# Patient Record
Sex: FEMALE | Race: WHITE | Employment: OTHER | URBAN - NONMETROPOLITAN AREA
[De-identification: names, ages, dates, MRNs, and addresses within clinical notes are randomized per-mention and may not be internally consistent; named-entity substitution may affect disease eponyms.]

---

## 2017-03-29 ENCOUNTER — TELEPHONE (OUTPATIENT)
Dept: PRIMARY CARE CLINIC | Age: 72
End: 2017-03-29

## 2017-03-31 DIAGNOSIS — I10 ESSENTIAL HYPERTENSION: Primary | ICD-10-CM

## 2017-04-10 DIAGNOSIS — I10 ESSENTIAL HYPERTENSION: ICD-10-CM

## 2017-04-10 LAB
ALBUMIN SERPL-MCNC: 4.4 G/DL (ref 3.5–5.2)
ALP BLD-CCNC: 75 U/L (ref 35–104)
ALT SERPL-CCNC: 18 U/L (ref 5–33)
ANION GAP SERPL CALCULATED.3IONS-SCNC: 14 MMOL/L (ref 7–19)
AST SERPL-CCNC: 19 U/L (ref 5–32)
BILIRUB SERPL-MCNC: 0.7 MG/DL (ref 0.2–1.2)
BUN BLDV-MCNC: 23 MG/DL (ref 8–23)
CALCIUM SERPL-MCNC: 9.7 MG/DL (ref 8.8–10.2)
CHLORIDE BLD-SCNC: 100 MMOL/L (ref 98–111)
CHOLESTEROL, TOTAL: 210 MG/DL (ref 160–199)
CO2: 27 MMOL/L (ref 22–29)
CREAT SERPL-MCNC: 0.7 MG/DL (ref 0.5–0.9)
GFR NON-AFRICAN AMERICAN: >60
GLOBULIN: 2.8 G/DL
GLUCOSE BLD-MCNC: 94 MG/DL (ref 74–109)
HDLC SERPL-MCNC: 65 MG/DL (ref 65–121)
LDL CHOLESTEROL CALCULATED: 119 MG/DL
POTASSIUM SERPL-SCNC: 4.1 MMOL/L (ref 3.5–5)
SODIUM BLD-SCNC: 141 MMOL/L (ref 136–145)
TOTAL PROTEIN: 7.2 G/DL (ref 6.6–8.7)
TRIGL SERPL-MCNC: 130 MG/DL (ref 150–199)
TSH SERPL DL<=0.05 MIU/L-ACNC: 1.22 UIU/ML (ref 0.27–4.2)

## 2017-04-17 ENCOUNTER — OFFICE VISIT (OUTPATIENT)
Dept: PRIMARY CARE CLINIC | Age: 72
End: 2017-04-17
Payer: MEDICARE

## 2017-04-17 VITALS
BODY MASS INDEX: 34.36 KG/M2 | SYSTOLIC BLOOD PRESSURE: 120 MMHG | RESPIRATION RATE: 20 BRPM | WEIGHT: 182 LBS | DIASTOLIC BLOOD PRESSURE: 82 MMHG | OXYGEN SATURATION: 98 % | HEIGHT: 61 IN | TEMPERATURE: 97 F | HEART RATE: 88 BPM

## 2017-04-17 DIAGNOSIS — Z23 NEED FOR PROPHYLACTIC VACCINATION AGAINST STREPTOCOCCUS PNEUMONIAE (PNEUMOCOCCUS): ICD-10-CM

## 2017-04-17 DIAGNOSIS — I10 ESSENTIAL HYPERTENSION: ICD-10-CM

## 2017-04-17 DIAGNOSIS — E78.2 MIXED HYPERLIPIDEMIA: ICD-10-CM

## 2017-04-17 DIAGNOSIS — Z12.31 ENCOUNTER FOR SCREENING MAMMOGRAM FOR MALIGNANT NEOPLASM OF BREAST: ICD-10-CM

## 2017-04-17 DIAGNOSIS — Z00.00 ROUTINE GENERAL MEDICAL EXAMINATION AT A HEALTH CARE FACILITY: Primary | ICD-10-CM

## 2017-04-17 DIAGNOSIS — Z12.39 BREAST CANCER SCREENING, HIGH RISK PATIENT: ICD-10-CM

## 2017-04-17 DIAGNOSIS — Z13.820 OSTEOPOROSIS SCREENING: ICD-10-CM

## 2017-04-17 DIAGNOSIS — Z90.710 HISTORY OF HYSTERECTOMY FOR CANCER: ICD-10-CM

## 2017-04-17 PROCEDURE — 3017F COLORECTAL CA SCREEN DOC REV: CPT | Performed by: FAMILY MEDICINE

## 2017-04-17 PROCEDURE — 99213 OFFICE O/P EST LOW 20 MIN: CPT | Performed by: FAMILY MEDICINE

## 2017-04-17 PROCEDURE — 4040F PNEUMOC VAC/ADMIN/RCVD: CPT | Performed by: FAMILY MEDICINE

## 2017-04-17 PROCEDURE — 1036F TOBACCO NON-USER: CPT | Performed by: FAMILY MEDICINE

## 2017-04-17 PROCEDURE — 1090F PRES/ABSN URINE INCON ASSESS: CPT | Performed by: FAMILY MEDICINE

## 2017-04-17 PROCEDURE — G8400 PT W/DXA NO RESULTS DOC: HCPCS | Performed by: FAMILY MEDICINE

## 2017-04-17 PROCEDURE — G8427 DOCREV CUR MEDS BY ELIG CLIN: HCPCS | Performed by: FAMILY MEDICINE

## 2017-04-17 PROCEDURE — 1123F ACP DISCUSS/DSCN MKR DOCD: CPT | Performed by: FAMILY MEDICINE

## 2017-04-17 PROCEDURE — G0439 PPPS, SUBSEQ VISIT: HCPCS | Performed by: FAMILY MEDICINE

## 2017-04-17 PROCEDURE — 90732 PPSV23 VACC 2 YRS+ SUBQ/IM: CPT | Performed by: FAMILY MEDICINE

## 2017-04-17 PROCEDURE — 3014F SCREEN MAMMO DOC REV: CPT | Performed by: FAMILY MEDICINE

## 2017-04-17 PROCEDURE — G0009 ADMIN PNEUMOCOCCAL VACCINE: HCPCS | Performed by: FAMILY MEDICINE

## 2017-04-17 PROCEDURE — G8417 CALC BMI ABV UP PARAM F/U: HCPCS | Performed by: FAMILY MEDICINE

## 2017-04-17 RX ORDER — LISINOPRIL 30 MG/1
30 TABLET ORAL DAILY
Qty: 90 TABLET | Refills: 3 | Status: SHIPPED | OUTPATIENT
Start: 2017-04-17 | End: 2018-04-23 | Stop reason: SDUPTHER

## 2017-04-17 RX ORDER — HYDROCHLOROTHIAZIDE 25 MG/1
25 TABLET ORAL DAILY
Qty: 90 TABLET | Refills: 3 | Status: SHIPPED | OUTPATIENT
Start: 2017-04-17 | End: 2017-04-17 | Stop reason: SDUPTHER

## 2017-04-17 RX ORDER — HYDROCHLOROTHIAZIDE 25 MG/1
25 TABLET ORAL DAILY
Qty: 90 TABLET | Refills: 3 | Status: SHIPPED | OUTPATIENT
Start: 2017-04-17 | End: 2018-04-23

## 2017-04-17 RX ORDER — LOVASTATIN 40 MG/1
40 TABLET ORAL NIGHTLY
Qty: 90 TABLET | Refills: 3 | Status: SHIPPED | OUTPATIENT
Start: 2017-04-17 | End: 2018-04-23 | Stop reason: SDUPTHER

## 2017-04-17 RX ORDER — LISINOPRIL 30 MG/1
30 TABLET ORAL DAILY
Qty: 90 TABLET | Refills: 3 | Status: SHIPPED | OUTPATIENT
Start: 2017-04-17 | End: 2017-04-17 | Stop reason: SDUPTHER

## 2017-04-17 RX ORDER — LOVASTATIN 40 MG/1
40 TABLET ORAL NIGHTLY
Qty: 90 TABLET | Refills: 3 | Status: SHIPPED | OUTPATIENT
Start: 2017-04-17 | End: 2017-04-17 | Stop reason: SDUPTHER

## 2017-04-17 ASSESSMENT — ENCOUNTER SYMPTOMS
EYE PAIN: 0
PHOTOPHOBIA: 0
VOMITING: 0
COUGH: 0
NAUSEA: 0
RHINORRHEA: 0
TROUBLE SWALLOWING: 0
DIARRHEA: 0
WHEEZING: 0
SHORTNESS OF BREATH: 0
ABDOMINAL PAIN: 0
CHEST TIGHTNESS: 0
COLOR CHANGE: 0
CONSTIPATION: 0
SORE THROAT: 0

## 2017-07-19 ENCOUNTER — OFFICE VISIT (OUTPATIENT)
Dept: GASTROENTEROLOGY | Facility: CLINIC | Age: 72
End: 2017-07-19

## 2017-07-19 VITALS
BODY MASS INDEX: 33.61 KG/M2 | OXYGEN SATURATION: 98 % | WEIGHT: 178 LBS | HEIGHT: 61 IN | HEART RATE: 69 BPM | TEMPERATURE: 98.5 F | DIASTOLIC BLOOD PRESSURE: 70 MMHG | SYSTOLIC BLOOD PRESSURE: 124 MMHG

## 2017-07-19 DIAGNOSIS — K63.5 COLON POLYPS: Primary | ICD-10-CM

## 2017-07-19 DIAGNOSIS — Z83.71 FAMILY HISTORY OF POLYPS IN THE COLON: ICD-10-CM

## 2017-07-19 PROCEDURE — S0260 H&P FOR SURGERY: HCPCS | Performed by: NURSE PRACTITIONER

## 2017-07-19 RX ORDER — HYDROCHLOROTHIAZIDE 25 MG/1
25 TABLET ORAL DAILY
COMMUNITY
Start: 2017-06-26 | End: 2022-09-22 | Stop reason: ALTCHOICE

## 2017-07-19 RX ORDER — LOVASTATIN 40 MG/1
40 TABLET ORAL NIGHTLY
COMMUNITY
Start: 2017-06-05

## 2017-07-19 RX ORDER — LISINOPRIL 40 MG/1
40 TABLET ORAL DAILY
COMMUNITY
Start: 2017-06-26

## 2017-07-19 RX ORDER — CHLORAL HYDRATE 500 MG
1000 CAPSULE ORAL
COMMUNITY

## 2017-07-19 RX ORDER — SODIUM, POTASSIUM,MAG SULFATES 17.5-3.13G
2 SOLUTION, RECONSTITUTED, ORAL ORAL ONCE
Qty: 2 BOTTLE | Refills: 0 | Status: SHIPPED | OUTPATIENT
Start: 2017-07-19 | End: 2017-07-19

## 2017-07-19 RX ORDER — FAMOTIDINE 20 MG
1000 TABLET ORAL DAILY
COMMUNITY

## 2017-07-19 NOTE — PROGRESS NOTES
Chief Complaint   Patient presents with   • Colon Cancer Screening     Patient is here today for colon screening.     Subjective   HPI  Charity Meek is a 71 y.o. female who presents as a referral for preventative maintenance. She has no complaints of nausea or vomiting. No change in bowels. No wt loss. No BRBPR. No melena. There is no family hx for colon cancer. No abdominal pain. Last colonoscopy was 6/18/12 polypoid lesion at the dentate line, anterior wall at the anus. She was sent to  for removal and had it removed on 2 separate occasions.  Past Medical History:   Diagnosis Date   • DJD (degenerative joint disease)    • HTN (hypertension)    • Hyperlipemia      Past Surgical History:   Procedure Laterality Date   • BREAST BIOPSY     • COLONOSCOPY  06/18/2012   • HYSTERECTOMY     • REPLACEMENT TOTAL KNEE         Current Outpatient Prescriptions:   •  hydrochlorothiazide (HYDRODIURIL) 25 MG tablet, , Disp: , Rfl:   •  lisinopril (PRINIVIL,ZESTRIL) 30 MG tablet, , Disp: , Rfl:   •  lovastatin (MEVACOR) 40 MG tablet, , Disp: , Rfl:   •  Omega-3 Fatty Acids (FISH OIL) 1000 MG capsule capsule, Take  by mouth., Disp: , Rfl:   •  Vitamin D, Cholecalciferol, (CHOLECALCIFEROL) 400 UNITS tablet, Take 400 Units by mouth Daily., Disp: , Rfl:   •  sodium-potassium-magnesium sulfates (SUPREP BOWEL PREP KIT) 17.5-3.13-1.6 GM/180ML solution oral solution, Take 2 bottles by mouth 1 (One) Time for 1 dose. Split dose prep as directed by office instructions provided.  2 bottles = one kit., Disp: 2 bottle, Rfl: 0  No Known Allergies  Social History     Social History   • Marital status:      Spouse name: N/A   • Number of children: N/A   • Years of education: N/A     Occupational History   • Not on file.     Social History Main Topics   • Smoking status: Never Smoker   • Smokeless tobacco: Never Used   • Alcohol use No   • Drug use: Not on file   • Sexual activity: Not on file     Other Topics Concern   • Not on  "file     Social History Narrative     Family History   Problem Relation Age of Onset   • Colon polyps Sister        REVIEW OF SYSTEMS  General: well appearing, no fever chills or sweats, no unexplained wt loss  HEENT: no acute visual or hearing disturbances  Cardiovascular: No chest pain or palpitations  Pulmonary: No shortness of breath, coughing, wheezing or hemoptysis  : No burning, urgency, hematuria, or dysuria  Musculoskeletal: No joint pain or stiffness  Peripheral: no edema  Skin: No lesions or rashes  Neuro: No dizziness, headaches, stroke, syncope  Endocrine: No hot or cold intolerances  Hematological: No blood dyscrasias    Objective   Vitals:    07/19/17 0849   BP: 124/70   Pulse: 69   Temp: 98.5 °F (36.9 °C)   SpO2: 98%   Weight: 178 lb (80.7 kg)   Height: 61\" (154.9 cm)     Body mass index is 33.63 kg/(m^2).    PHYSICAL EXAM  General: age appropriate well nourished well appearing, no acute distress  Head: normocephalic and atraumatic  Global assessment-supple  Neck-No JVD noted, no lymphadenopathy  Pulmonary-clear to auscultation bilaterally, normal respiratory effort  Cardiovascular-normal rate and rhythm, normal heart sounds, S1 and S2 noted  Abdomen-soft, non tender, non distended, normal bowel sounds all 4 quadrants, no hepatosplenomegaly noted  Extremities-No clubbing cyanosis or edema  Neuro-Non focal, converses appropriately, awake, alert, oriented    Imaging Results (most recent)     None        Assessment/Plan   Charity was seen today for colon cancer screening.    Diagnoses and all orders for this visit:    Colon polyps  -     Case Request; Standing  -     Case Request    Family history of polyps in the colon  -     Case Request; Standing  -     Case Request    Other orders  -     Implement Anesthesia Orders Day of Procedure; Standing  -     Obtain Informed Consent; Standing  -     Verify bowel prep was successful; Standing  -     sodium-potassium-magnesium sulfates (SUPREP BOWEL PREP KIT) " 17.5-3.13-1.6 GM/180ML solution oral solution; Take 2 bottles by mouth 1 (One) Time for 1 dose. Split dose prep as directed by office instructions provided.  2 bottles = one kit.      COLONOSCOPY WITH ANESTHESIA (N/A)  Return for FU sp procedure as directed.  There are no Patient Instructions on file for this visit.    All risks, benefits, alternatives, and indications of colonoscopy procedure have been discussed with the patient. Risks to include perforation of the colon requiring possible surgery or colostomy, risk of bleeding from biopsies or removal of colon tissue, possibility of missing a colon polyp or cancer, or adverse drug reaction.  Benefits to include the diagnosis and management of disease of the colon and rectum. Alternatives to include barium enema, radiographic evaluation, lab testing or no intervention. Pt verbalizes understanding and agrees.

## 2017-08-30 ENCOUNTER — ANESTHESIA (OUTPATIENT)
Dept: GASTROENTEROLOGY | Facility: HOSPITAL | Age: 72
End: 2017-08-30

## 2017-08-30 ENCOUNTER — ANESTHESIA EVENT (OUTPATIENT)
Dept: GASTROENTEROLOGY | Facility: HOSPITAL | Age: 72
End: 2017-08-30

## 2017-08-30 ENCOUNTER — HOSPITAL ENCOUNTER (OUTPATIENT)
Facility: HOSPITAL | Age: 72
Setting detail: HOSPITAL OUTPATIENT SURGERY
Discharge: HOME | End: 2017-08-30
Attending: INTERNAL MEDICINE | Admitting: INTERNAL MEDICINE

## 2017-08-30 VITALS
BODY MASS INDEX: 33.77 KG/M2 | SYSTOLIC BLOOD PRESSURE: 134 MMHG | DIASTOLIC BLOOD PRESSURE: 70 MMHG | OXYGEN SATURATION: 97 % | WEIGHT: 172 LBS | HEART RATE: 67 BPM | HEIGHT: 60 IN | RESPIRATION RATE: 15 BRPM | TEMPERATURE: 98.3 F

## 2017-08-30 DIAGNOSIS — K63.5 COLON POLYPS: ICD-10-CM

## 2017-08-30 DIAGNOSIS — Z83.71 FAMILY HISTORY OF POLYPS IN THE COLON: ICD-10-CM

## 2017-08-30 PROCEDURE — 45385 COLONOSCOPY W/LESION REMOVAL: CPT | Performed by: INTERNAL MEDICINE

## 2017-08-30 PROCEDURE — 25010000002 PROPOFOL 10 MG/ML EMULSION: Performed by: NURSE ANESTHETIST, CERTIFIED REGISTERED

## 2017-08-30 PROCEDURE — 88305 TISSUE EXAM BY PATHOLOGIST: CPT | Performed by: INTERNAL MEDICINE

## 2017-08-30 RX ORDER — LIDOCAINE HYDROCHLORIDE 20 MG/ML
INJECTION, SOLUTION INFILTRATION; PERINEURAL AS NEEDED
Status: DISCONTINUED | OUTPATIENT
Start: 2017-08-30 | End: 2017-08-30 | Stop reason: SURG

## 2017-08-30 RX ORDER — SODIUM CHLORIDE 9 MG/ML
100 INJECTION, SOLUTION INTRAVENOUS CONTINUOUS
Status: DISCONTINUED | OUTPATIENT
Start: 2017-08-30 | End: 2017-08-30 | Stop reason: HOSPADM

## 2017-08-30 RX ORDER — PROPOFOL 10 MG/ML
VIAL (ML) INTRAVENOUS AS NEEDED
Status: DISCONTINUED | OUTPATIENT
Start: 2017-08-30 | End: 2017-08-30 | Stop reason: SURG

## 2017-08-30 RX ORDER — SODIUM CHLORIDE 0.9 % (FLUSH) 0.9 %
1-10 SYRINGE (ML) INJECTION AS NEEDED
Status: DISCONTINUED | OUTPATIENT
Start: 2017-08-30 | End: 2017-08-30 | Stop reason: HOSPADM

## 2017-08-30 RX ADMIN — PROPOFOL 10 MG: 10 INJECTION, EMULSION INTRAVENOUS at 10:47

## 2017-08-30 RX ADMIN — LIDOCAINE HYDROCHLORIDE 50 MG: 20 INJECTION, SOLUTION INFILTRATION; PERINEURAL at 10:40

## 2017-08-30 RX ADMIN — SODIUM CHLORIDE 100 ML/HR: 9 INJECTION, SOLUTION INTRAVENOUS at 09:15

## 2017-08-30 RX ADMIN — PROPOFOL 50 MG: 10 INJECTION, EMULSION INTRAVENOUS at 10:40

## 2017-08-30 RX ADMIN — PROPOFOL 30 MG: 10 INJECTION, EMULSION INTRAVENOUS at 10:42

## 2017-08-30 RX ADMIN — PROPOFOL 30 MG: 10 INJECTION, EMULSION INTRAVENOUS at 10:45

## 2017-08-30 RX ADMIN — PROPOFOL 10 MG: 10 INJECTION, EMULSION INTRAVENOUS at 10:50

## 2017-08-30 RX ADMIN — PROPOFOL 10 MG: 10 INJECTION, EMULSION INTRAVENOUS at 10:48

## 2017-08-30 RX ADMIN — PROPOFOL 30 MG: 10 INJECTION, EMULSION INTRAVENOUS at 10:41

## 2017-08-30 RX ADMIN — PROPOFOL 10 MG: 10 INJECTION, EMULSION INTRAVENOUS at 10:51

## 2017-08-30 RX ADMIN — PROPOFOL 30 MG: 10 INJECTION, EMULSION INTRAVENOUS at 10:46

## 2017-08-30 NOTE — ANESTHESIA POSTPROCEDURE EVALUATION
"Patient: Charity Meek    Procedure Summary     Date Anesthesia Start Anesthesia Stop Room / Location    08/30/17 1034 1058  [facility] / [facility]       Procedure Diagnosis Surgeon Provider    COLONOSCOPY WITH ANESTHESIA (N/A ) Family history of polyps in the colon; Colon polyps  (Family history of polyps in the colon [Z83.71]; Colon polyps [K63.5]) MD Leslie Stevens, ZACH          Anesthesia Type: general  Last vitals  BP        Temp        Pulse       Resp        SpO2          Post Anesthesia Care and Evaluation    Patient location during evaluation: PHASE II  Patient participation: complete - patient participated  Level of consciousness: awake and alert  Pain score: 0  Pain management: adequate  Airway patency: patent  Anesthetic complications: No anesthetic complications    Cardiovascular status: acceptable and stable  Respiratory status: acceptable and unassisted  Hydration status: stable    Comments: Blood pressure 131/87, pulse 80, temperature 98.3 °F (36.8 °C), temperature source Temporal Artery , resp. rate 20, height 60\" (152.4 cm), weight 172 lb (78 kg), SpO2 96 %.      "

## 2017-08-30 NOTE — ANESTHESIA PREPROCEDURE EVALUATION
Anesthesia Evaluation     Patient summary reviewed and Nursing notes reviewed   no history of anesthetic complications:  NPO Solid Status: > 8 hours  NPO Liquid Status: > 4 hours     Airway   Mallampati: II  TM distance: >3 FB  Neck ROM: full  no difficulty expected  Dental - normal exam     Comment: Multiple permanent crowns and caps    Pulmonary - normal exam   (-) COPD, asthma, recent URI, sleep apnea, not a smoker  Cardiovascular - normal exam  Exercise tolerance: good (4-7 METS) (Mild SOB with climbing steps, no CP)    Rhythm: regular  Rate: normal    (+) hypertension well controlled, hyperlipidemia  (-) past MI, angina, cardiac stents      Neuro/Psych  (-) seizures, TIA, CVA, weakness, numbness  GI/Hepatic/Renal/Endo    (-) GERD, liver disease, no renal disease, diabetes, hypothyroidism, hyperthyroidism    Musculoskeletal     (-) neck pain, neck stiffness  Abdominal    Substance History      OB/GYN          Other   (+) arthritis                                   Anesthesia Plan    ASA 2     general   total IV anesthesia  intravenous induction   Anesthetic plan and risks discussed with patient.

## 2017-09-01 LAB
LAB AP CASE REPORT: NORMAL
LAB AP CLINICAL INFORMATION: NORMAL
Lab: NORMAL
PATH REPORT.FINAL DX SPEC: NORMAL
PATH REPORT.GROSS SPEC: NORMAL

## 2017-09-08 ENCOUNTER — TELEPHONE (OUTPATIENT)
Dept: GASTROENTEROLOGY | Facility: CLINIC | Age: 72
End: 2017-09-08

## 2017-09-08 NOTE — TELEPHONE ENCOUNTER
----- Message from Viky Hu MD sent at 9/3/2017 10:05 PM CDT -----  I am writing to inform you that the polyp removed from the colon did not have any cancer. It no longer poses a threat to you since it was completely removed.  However, in the future, it is possible that additional polyps might grow.  For this reason, we will repeat colonoscopy in 5 years as planned.    If you have any questions, please call our office.    Sincerely,        Viky Hu MD

## 2018-04-23 ENCOUNTER — OFFICE VISIT (OUTPATIENT)
Dept: PRIMARY CARE CLINIC | Age: 73
End: 2018-04-23
Payer: MEDICARE

## 2018-04-23 VITALS
SYSTOLIC BLOOD PRESSURE: 142 MMHG | TEMPERATURE: 98 F | DIASTOLIC BLOOD PRESSURE: 82 MMHG | BODY MASS INDEX: 35.93 KG/M2 | HEART RATE: 72 BPM | RESPIRATION RATE: 20 BRPM | HEIGHT: 60 IN | WEIGHT: 183 LBS

## 2018-04-23 DIAGNOSIS — M10.279 ACUTE DRUG-INDUCED GOUT OF FOOT, UNSPECIFIED LATERALITY: ICD-10-CM

## 2018-04-23 DIAGNOSIS — I10 ESSENTIAL HYPERTENSION: ICD-10-CM

## 2018-04-23 DIAGNOSIS — M85.80 OSTEOPENIA, UNSPECIFIED LOCATION: ICD-10-CM

## 2018-04-23 DIAGNOSIS — E78.2 MIXED HYPERLIPIDEMIA: ICD-10-CM

## 2018-04-23 DIAGNOSIS — Z00.00 ROUTINE GENERAL MEDICAL EXAMINATION AT A HEALTH CARE FACILITY: Primary | ICD-10-CM

## 2018-04-23 LAB
ALBUMIN SERPL-MCNC: 4.6 G/DL (ref 3.5–5.2)
ALP BLD-CCNC: 78 U/L (ref 35–104)
ALT SERPL-CCNC: 26 U/L (ref 5–33)
ANION GAP SERPL CALCULATED.3IONS-SCNC: 18 MMOL/L (ref 7–19)
AST SERPL-CCNC: 25 U/L (ref 5–32)
BASOPHILS ABSOLUTE: 0 K/UL (ref 0–0.2)
BASOPHILS RELATIVE PERCENT: 0.2 % (ref 0–1)
BILIRUB SERPL-MCNC: 0.8 MG/DL (ref 0.2–1.2)
BUN BLDV-MCNC: 16 MG/DL (ref 8–23)
CALCIUM SERPL-MCNC: 10.2 MG/DL (ref 8.8–10.2)
CHLORIDE BLD-SCNC: 98 MMOL/L (ref 98–111)
CHOLESTEROL, TOTAL: 206 MG/DL (ref 160–199)
CO2: 26 MMOL/L (ref 22–29)
CREAT SERPL-MCNC: 0.7 MG/DL (ref 0.5–0.9)
EOSINOPHILS ABSOLUTE: 0 K/UL (ref 0–0.6)
EOSINOPHILS RELATIVE PERCENT: 0.7 % (ref 0–5)
GFR NON-AFRICAN AMERICAN: >60
GLUCOSE BLD-MCNC: 86 MG/DL (ref 74–109)
HCT VFR BLD CALC: 44.4 % (ref 37–47)
HDLC SERPL-MCNC: 70 MG/DL (ref 65–121)
HEMOGLOBIN: 15 G/DL (ref 12–16)
LDL CHOLESTEROL CALCULATED: 116 MG/DL
LYMPHOCYTES ABSOLUTE: 1.6 K/UL (ref 1.1–4.5)
LYMPHOCYTES RELATIVE PERCENT: 26.3 % (ref 20–40)
MCH RBC QN AUTO: 30.2 PG (ref 27–31)
MCHC RBC AUTO-ENTMCNC: 33.8 G/DL (ref 33–37)
MCV RBC AUTO: 89.5 FL (ref 81–99)
MONOCYTES ABSOLUTE: 0.4 K/UL (ref 0–0.9)
MONOCYTES RELATIVE PERCENT: 6.8 % (ref 0–10)
NEUTROPHILS ABSOLUTE: 4 K/UL (ref 1.5–7.5)
NEUTROPHILS RELATIVE PERCENT: 65.8 % (ref 50–65)
PDW BLD-RTO: 12.4 % (ref 11.5–14.5)
PLATELET # BLD: 183 K/UL (ref 130–400)
PMV BLD AUTO: 10.3 FL (ref 9.4–12.3)
POTASSIUM SERPL-SCNC: 3.7 MMOL/L (ref 3.5–5)
RBC # BLD: 4.96 M/UL (ref 4.2–5.4)
SODIUM BLD-SCNC: 142 MMOL/L (ref 136–145)
TOTAL PROTEIN: 7.8 G/DL (ref 6.6–8.7)
TRIGL SERPL-MCNC: 101 MG/DL (ref 0–149)
WBC # BLD: 6 K/UL (ref 4.8–10.8)

## 2018-04-23 PROCEDURE — 1090F PRES/ABSN URINE INCON ASSESS: CPT | Performed by: FAMILY MEDICINE

## 2018-04-23 PROCEDURE — G8427 DOCREV CUR MEDS BY ELIG CLIN: HCPCS | Performed by: FAMILY MEDICINE

## 2018-04-23 PROCEDURE — 1123F ACP DISCUSS/DSCN MKR DOCD: CPT | Performed by: FAMILY MEDICINE

## 2018-04-23 PROCEDURE — G8400 PT W/DXA NO RESULTS DOC: HCPCS | Performed by: FAMILY MEDICINE

## 2018-04-23 PROCEDURE — 1036F TOBACCO NON-USER: CPT | Performed by: FAMILY MEDICINE

## 2018-04-23 PROCEDURE — G0439 PPPS, SUBSEQ VISIT: HCPCS | Performed by: FAMILY MEDICINE

## 2018-04-23 PROCEDURE — G8417 CALC BMI ABV UP PARAM F/U: HCPCS | Performed by: FAMILY MEDICINE

## 2018-04-23 PROCEDURE — 3017F COLORECTAL CA SCREEN DOC REV: CPT | Performed by: FAMILY MEDICINE

## 2018-04-23 PROCEDURE — 4040F PNEUMOC VAC/ADMIN/RCVD: CPT | Performed by: FAMILY MEDICINE

## 2018-04-23 PROCEDURE — 99213 OFFICE O/P EST LOW 20 MIN: CPT | Performed by: FAMILY MEDICINE

## 2018-04-23 RX ORDER — LISINOPRIL 40 MG/1
40 TABLET ORAL DAILY
Qty: 90 TABLET | Refills: 3 | Status: SHIPPED | OUTPATIENT
Start: 2018-04-23 | End: 2019-05-15 | Stop reason: SDUPTHER

## 2018-04-23 RX ORDER — LISINOPRIL 40 MG/1
40 TABLET ORAL DAILY
Qty: 90 TABLET | Refills: 3 | Status: SHIPPED | OUTPATIENT
Start: 2018-04-23 | End: 2018-04-23 | Stop reason: SDUPTHER

## 2018-04-23 RX ORDER — LOVASTATIN 40 MG/1
40 TABLET ORAL NIGHTLY
Qty: 90 TABLET | Refills: 3 | Status: SHIPPED | OUTPATIENT
Start: 2018-04-23 | End: 2018-06-05 | Stop reason: SDUPTHER

## 2018-04-23 ASSESSMENT — ENCOUNTER SYMPTOMS
SHORTNESS OF BREATH: 0
SORE THROAT: 0
EYE ITCHING: 0
RHINORRHEA: 0
COUGH: 0
NAUSEA: 0
ABDOMINAL PAIN: 0
COLOR CHANGE: 0

## 2018-04-23 ASSESSMENT — PATIENT HEALTH QUESTIONNAIRE - PHQ9
SUM OF ALL RESPONSES TO PHQ QUESTIONS 1-9: 0
2. FEELING DOWN, DEPRESSED OR HOPELESS: 0
SUM OF ALL RESPONSES TO PHQ9 QUESTIONS 1 & 2: 0
1. LITTLE INTEREST OR PLEASURE IN DOING THINGS: 0

## 2018-06-05 DIAGNOSIS — E78.2 MIXED HYPERLIPIDEMIA: ICD-10-CM

## 2018-06-05 RX ORDER — LOVASTATIN 40 MG/1
40 TABLET ORAL NIGHTLY
Qty: 90 TABLET | Refills: 3 | Status: SHIPPED | OUTPATIENT
Start: 2018-06-05 | End: 2019-05-15 | Stop reason: SDUPTHER

## 2018-10-29 ENCOUNTER — OFFICE VISIT (OUTPATIENT)
Dept: PRIMARY CARE CLINIC | Age: 73
End: 2018-10-29
Payer: MEDICARE

## 2018-10-29 VITALS
SYSTOLIC BLOOD PRESSURE: 158 MMHG | HEART RATE: 65 BPM | TEMPERATURE: 98 F | WEIGHT: 191 LBS | OXYGEN SATURATION: 98 % | RESPIRATION RATE: 20 BRPM | HEIGHT: 60 IN | BODY MASS INDEX: 37.5 KG/M2 | DIASTOLIC BLOOD PRESSURE: 98 MMHG

## 2018-10-29 DIAGNOSIS — I87.2 VENOUS (PERIPHERAL) INSUFFICIENCY: ICD-10-CM

## 2018-10-29 DIAGNOSIS — M10.241 ACUTE DRUG-INDUCED GOUT OF RIGHT HAND: ICD-10-CM

## 2018-10-29 DIAGNOSIS — I10 UNCONTROLLED HYPERTENSION: Primary | ICD-10-CM

## 2018-10-29 PROCEDURE — 1123F ACP DISCUSS/DSCN MKR DOCD: CPT | Performed by: FAMILY MEDICINE

## 2018-10-29 PROCEDURE — 99214 OFFICE O/P EST MOD 30 MIN: CPT | Performed by: FAMILY MEDICINE

## 2018-10-29 PROCEDURE — G8417 CALC BMI ABV UP PARAM F/U: HCPCS | Performed by: FAMILY MEDICINE

## 2018-10-29 PROCEDURE — 1036F TOBACCO NON-USER: CPT | Performed by: FAMILY MEDICINE

## 2018-10-29 PROCEDURE — 1090F PRES/ABSN URINE INCON ASSESS: CPT | Performed by: FAMILY MEDICINE

## 2018-10-29 PROCEDURE — 4040F PNEUMOC VAC/ADMIN/RCVD: CPT | Performed by: FAMILY MEDICINE

## 2018-10-29 PROCEDURE — G8427 DOCREV CUR MEDS BY ELIG CLIN: HCPCS | Performed by: FAMILY MEDICINE

## 2018-10-29 PROCEDURE — 1101F PT FALLS ASSESS-DOCD LE1/YR: CPT | Performed by: FAMILY MEDICINE

## 2018-10-29 PROCEDURE — G8484 FLU IMMUNIZE NO ADMIN: HCPCS | Performed by: FAMILY MEDICINE

## 2018-10-29 PROCEDURE — 3017F COLORECTAL CA SCREEN DOC REV: CPT | Performed by: FAMILY MEDICINE

## 2018-10-29 PROCEDURE — G8400 PT W/DXA NO RESULTS DOC: HCPCS | Performed by: FAMILY MEDICINE

## 2018-10-29 ASSESSMENT — ENCOUNTER SYMPTOMS
BACK PAIN: 0
COUGH: 0
SHORTNESS OF BREATH: 0
DIARRHEA: 0
WHEEZING: 0
VOMITING: 0
CHEST TIGHTNESS: 0
CONSTIPATION: 0
NAUSEA: 0
ABDOMINAL PAIN: 0

## 2018-10-29 NOTE — PROGRESS NOTES
Surgical History:   Procedure Laterality Date    ANUS SURGERY      polyp removed    BREAST BIOPSY      Right-benign    COLONOSCOPY      KNEE SURGERY      bilateral replacement    RECTAL SURGERY  8/24/2012    Rigid proctoscopy with transanal excision of rectal polyp       Social History   Substance Use Topics    Smoking status: Never Smoker    Smokeless tobacco: Never Used    Alcohol use No       Family History   Problem Relation Age of Onset    Heart Disease Mother     High Blood Pressure Mother     Heart Disease Father     High Blood Pressure Father        BP (!) 158/98   Pulse 65   Temp 98 °F (36.7 °C) (Temporal)   Resp 20   Ht 5' (1.524 m)   Wt 191 lb (86.6 kg)   SpO2 98%   BMI 37.30 kg/m²     Physical Exam   Constitutional: She is oriented to person, place, and time. She appears well-developed and well-nourished. No distress. HENT:   Head: Normocephalic and atraumatic. Cardiovascular: Normal rate, regular rhythm and normal heart sounds. No murmur heard. Pulmonary/Chest: Effort normal and breath sounds normal. No respiratory distress. She has no wheezes. She has no rales. Abdominal: Soft. Bowel sounds are normal. She exhibits no distension. There is no tenderness. Musculoskeletal: She exhibits edema (1+ b/l pitting). No pretibial edema b/l. Neurological: She is alert and oriented to person, place, and time. Skin: Skin is warm and dry. She is not diaphoretic. No cyanosis. Nursing note and vitals reviewed. Assessment:    ICD-10-CM    1. Uncontrolled hypertension I10    2. Acute drug-induced gout of right hand M10.241    3. Venous (peripheral) insufficiency I87.2        Plan:   Looks like venous insufficiency, Rx for comp stocking. HTN uncontrolled, see med changes. If one more gout flare, start naproxen daily w/ allopurinol for 6 weeks then allopurinol daily  No orders of the defined types were placed in this encounter.     Orders Placed This Encounter   Medications

## 2019-05-15 ENCOUNTER — OFFICE VISIT (OUTPATIENT)
Dept: PRIMARY CARE CLINIC | Age: 74
End: 2019-05-15
Payer: MEDICARE

## 2019-05-15 VITALS
HEIGHT: 60 IN | SYSTOLIC BLOOD PRESSURE: 142 MMHG | HEART RATE: 94 BPM | DIASTOLIC BLOOD PRESSURE: 94 MMHG | OXYGEN SATURATION: 96 % | TEMPERATURE: 98.2 F | BODY MASS INDEX: 38.05 KG/M2 | WEIGHT: 193.8 LBS

## 2019-05-15 DIAGNOSIS — E78.2 MIXED HYPERLIPIDEMIA: ICD-10-CM

## 2019-05-15 DIAGNOSIS — M10.372 ACUTE GOUT DUE TO RENAL IMPAIRMENT INVOLVING TOE OF LEFT FOOT: ICD-10-CM

## 2019-05-15 DIAGNOSIS — Z00.00 ROUTINE GENERAL MEDICAL EXAMINATION AT A HEALTH CARE FACILITY: Primary | ICD-10-CM

## 2019-05-15 DIAGNOSIS — I10 ESSENTIAL HYPERTENSION: ICD-10-CM

## 2019-05-15 LAB
ALBUMIN SERPL-MCNC: 4.6 G/DL (ref 3.5–5.2)
ALP BLD-CCNC: 89 U/L (ref 35–104)
ALT SERPL-CCNC: 20 U/L (ref 5–33)
ANION GAP SERPL CALCULATED.3IONS-SCNC: 13 MMOL/L (ref 7–19)
AST SERPL-CCNC: 19 U/L (ref 5–32)
BACTERIA: NEGATIVE /HPF
BILIRUB SERPL-MCNC: 0.9 MG/DL (ref 0.2–1.2)
BILIRUBIN URINE: NEGATIVE
BLOOD, URINE: NEGATIVE
BUN BLDV-MCNC: 15 MG/DL (ref 8–23)
CALCIUM SERPL-MCNC: 9.9 MG/DL (ref 8.8–10.2)
CHLORIDE BLD-SCNC: 100 MMOL/L (ref 98–111)
CLARITY: ABNORMAL
CO2: 30 MMOL/L (ref 22–29)
COLOR: ABNORMAL
CREAT SERPL-MCNC: 0.8 MG/DL (ref 0.5–0.9)
EPITHELIAL CELLS, UA: 6 /HPF (ref 0–5)
GFR NON-AFRICAN AMERICAN: >60
GLUCOSE BLD-MCNC: 103 MG/DL (ref 74–109)
GLUCOSE URINE: NEGATIVE MG/DL
HCT VFR BLD CALC: 46.8 % (ref 37–47)
HEMOGLOBIN: 15.3 G/DL (ref 12–16)
HYALINE CASTS: 5 /HPF (ref 0–8)
KETONES, URINE: NEGATIVE MG/DL
LEUKOCYTE ESTERASE, URINE: ABNORMAL
MCH RBC QN AUTO: 29.5 PG (ref 27–31)
MCHC RBC AUTO-ENTMCNC: 32.7 G/DL (ref 33–37)
MCV RBC AUTO: 90.3 FL (ref 81–99)
NITRITE, URINE: NEGATIVE
PDW BLD-RTO: 12.7 % (ref 11.5–14.5)
PH UA: 6 (ref 5–8)
PLATELET # BLD: 197 K/UL (ref 130–400)
PMV BLD AUTO: 10.5 FL (ref 9.4–12.3)
POTASSIUM SERPL-SCNC: 4.4 MMOL/L (ref 3.5–5)
PROTEIN UA: ABNORMAL MG/DL
RBC # BLD: 5.18 M/UL (ref 4.2–5.4)
RBC UA: 4 /HPF (ref 0–4)
SODIUM BLD-SCNC: 143 MMOL/L (ref 136–145)
SPECIFIC GRAVITY UA: 1.02 (ref 1–1.03)
TOTAL PROTEIN: 7.9 G/DL (ref 6.6–8.7)
UROBILINOGEN, URINE: 0.2 E.U./DL
WBC # BLD: 8.9 K/UL (ref 4.8–10.8)
WBC UA: 42 /HPF (ref 0–5)

## 2019-05-15 PROCEDURE — 99213 OFFICE O/P EST LOW 20 MIN: CPT | Performed by: FAMILY MEDICINE

## 2019-05-15 PROCEDURE — 1123F ACP DISCUSS/DSCN MKR DOCD: CPT | Performed by: FAMILY MEDICINE

## 2019-05-15 PROCEDURE — 96372 THER/PROPH/DIAG INJ SC/IM: CPT | Performed by: FAMILY MEDICINE

## 2019-05-15 PROCEDURE — 4040F PNEUMOC VAC/ADMIN/RCVD: CPT | Performed by: FAMILY MEDICINE

## 2019-05-15 PROCEDURE — 1036F TOBACCO NON-USER: CPT | Performed by: FAMILY MEDICINE

## 2019-05-15 PROCEDURE — 1090F PRES/ABSN URINE INCON ASSESS: CPT | Performed by: FAMILY MEDICINE

## 2019-05-15 PROCEDURE — G8417 CALC BMI ABV UP PARAM F/U: HCPCS | Performed by: FAMILY MEDICINE

## 2019-05-15 PROCEDURE — G0438 PPPS, INITIAL VISIT: HCPCS | Performed by: FAMILY MEDICINE

## 2019-05-15 PROCEDURE — G8400 PT W/DXA NO RESULTS DOC: HCPCS | Performed by: FAMILY MEDICINE

## 2019-05-15 PROCEDURE — G8427 DOCREV CUR MEDS BY ELIG CLIN: HCPCS | Performed by: FAMILY MEDICINE

## 2019-05-15 PROCEDURE — 3017F COLORECTAL CA SCREEN DOC REV: CPT | Performed by: FAMILY MEDICINE

## 2019-05-15 RX ORDER — LISINOPRIL 40 MG/1
40 TABLET ORAL DAILY
Qty: 90 TABLET | Refills: 3 | Status: SHIPPED | OUTPATIENT
Start: 2019-05-15 | End: 2020-05-21

## 2019-05-15 RX ORDER — LOVASTATIN 40 MG/1
40 TABLET ORAL NIGHTLY
Qty: 90 TABLET | Refills: 3 | Status: SHIPPED | OUTPATIENT
Start: 2019-05-15 | End: 2020-05-21

## 2019-05-15 RX ORDER — METHYLPREDNISOLONE SODIUM SUCCINATE 40 MG/ML
80 INJECTION, POWDER, LYOPHILIZED, FOR SOLUTION INTRAMUSCULAR; INTRAVENOUS ONCE
Status: COMPLETED | OUTPATIENT
Start: 2019-05-15 | End: 2019-05-15

## 2019-05-15 RX ORDER — ALLOPURINOL 100 MG/1
200 TABLET ORAL DAILY
Qty: 60 TABLET | Refills: 5 | Status: SHIPPED | OUTPATIENT
Start: 2019-05-15 | End: 2019-12-20

## 2019-05-15 RX ORDER — COLCHICINE 0.6 MG/1
TABLET ORAL
Qty: 90 TABLET | Refills: 0 | Status: SHIPPED | OUTPATIENT
Start: 2019-05-15 | End: 2020-07-07 | Stop reason: SDUPTHER

## 2019-05-15 RX ADMIN — METHYLPREDNISOLONE SODIUM SUCCINATE 80 MG: 40 INJECTION, POWDER, LYOPHILIZED, FOR SOLUTION INTRAMUSCULAR; INTRAVENOUS at 10:09

## 2019-05-15 ASSESSMENT — ANXIETY QUESTIONNAIRES: GAD7 TOTAL SCORE: 0

## 2019-05-15 ASSESSMENT — PATIENT HEALTH QUESTIONNAIRE - PHQ9
SUM OF ALL RESPONSES TO PHQ QUESTIONS 1-9: 0
SUM OF ALL RESPONSES TO PHQ QUESTIONS 1-9: 0

## 2019-05-15 ASSESSMENT — LIFESTYLE VARIABLES: HOW OFTEN DO YOU HAVE A DRINK CONTAINING ALCOHOL: 0

## 2019-05-15 NOTE — PROGRESS NOTES
in providing care):   Patient Care Team:  Duran Galarza MD as PCP - General (Family Medicine)  Duran Galarza MD as PCP - S Attributed Provider    Wt Readings from Last 3 Encounters:   05/15/19 193 lb 12.8 oz (87.9 kg)   10/29/18 191 lb (86.6 kg)   04/23/18 183 lb (83 kg)     Vitals:    05/15/19 0925 05/15/19 0929   BP: (!) 140/96 (!) 142/94   Site: Left Upper Arm Right Upper Arm   Pulse: 94    Temp: 98.2 °F (36.8 °C)    TempSrc: Temporal    SpO2: 96%    Weight: 193 lb 12.8 oz (87.9 kg)    Height: 5' (1.524 m)      Body mass index is 37.85 kg/m². Based upon direct observation of the patient, evaluation of cognition reveals recent and remote memory intact. Patient's complete Health Risk Assessment and screening values have been reviewed and are found in Flowsheets. The following problems were reviewed today and where indicated follow up appointments were made and/or referrals ordered. Positive Risk Factor Screenings with Interventions:     General Health:  General  In general, how would you say your health is?: Good  In the past 7 days, have you experienced any of the following? New or Increased Pain, New or Increased Fatigue, Loneliness, Social Isolation, Stress or Anger?: (!) New or Increased Pain  Do you get the social and emotional support that you need?: Yes  Do you have a Living Will?: (!) No  General Health Risk Interventions:  · No Living Will: provided the state-specific advance directive document to the patient    Health Habits/Nutrition:  Health Habits/Nutrition  Do you exercise for at least 20 minutes 2-3 times per week?: Yes  Have you lost any weight without trying in the past 3 months?: No  Do you eat fewer than 2 meals per day?: No  Have you seen a dentist within the past year?: Yes  Body mass index is 37.85 kg/m².   Health Habits/Nutrition Interventions:  · Nutritional issues:  educational materials for healthy, well-balanced diet provided    Safety:  Safety  Do you have working smoke detectors?: Yes  Have all throw rugs been removed or fastened?: Yes  Do you have non-slip mats in all bathtubs?: Yes  Do all of your stairways have a railing or banister?: (!) No  Are your doorways, halls and stairs free of clutter?: Yes  Do you always fasten your seatbelt when you are in a car?: Yes  Safety Interventions:  · Home safety tips provided    Personalized Preventive Plan   Current Health Maintenance Status  Immunization History   Administered Date(s) Administered    DTaP 04/17/2015    Pneumococcal 13-valent Conjugate ([id number]) 04/17/2015    Pneumococcal Polysaccharide ([id number]) 04/17/2017    Zoster Subunit (Shingrix) 04/17/2019        Health Maintenance   Topic Date Due    Potassium monitoring  04/23/2019    Creatinine monitoring  04/23/2019    Shingles Vaccine (2 of 2) 06/12/2019    Flu vaccine (Season Ended) 09/01/2019    Breast cancer screen  08/24/2020    Lipid screen  04/23/2023    DTaP/Tdap/Td vaccine (2 - Tdap) 04/17/2025    Colon cancer screen colonoscopy  08/30/2027    Pneumococcal 65+ years Vaccine  Completed    DEXA (modify frequency per FRAX score)  Addressed    Hepatitis C screen  Addressed     Recommendations for Preventive Services Due: see orders and patient instructions/AVS.  .   Recommended screening schedule for the next 5-10 years is provided to the patient in written form: see Patient Instructions/AVS.

## 2019-05-15 NOTE — PROGRESS NOTES
Eladio Boateng is a 68 y.o. female who presents today for   Chief Complaint   Patient presents with    Medicare AWV    Gout     right foot        HPI  Patient is here for annual wellness visit but also having severe foot pain. She has a history of gout but it has been some time. She notes that she was in severe pain but knew she had an appointment today and wanted to wait. She has a history hyperlipidemia and hypertension but no major underlying kidney disorder. She is due for laboratory follow-up as well for chronic disease    No change in PMH, family, social, or surgical history unless mentioned above. Review of Systems   Constitutional: Negative for chills and fever. Respiratory: Negative for cough, chest tightness, shortness of breath and wheezing. Cardiovascular: Negative for chest pain, palpitations and leg swelling. Gastrointestinal: Negative for abdominal pain, constipation, diarrhea, nausea and vomiting. Genitourinary: Negative for difficulty urinating, dysuria and frequency. Musculoskeletal: Positive for arthralgias and gait problem. Past Medical History:   Diagnosis Date    Hyperlipidemia     Hypertension     Osteoarthritis        Current Outpatient Medications   Medication Sig Dispense Refill    lisinopril (PRINIVIL;ZESTRIL) 40 MG tablet Take 1 tablet by mouth daily 90 tablet 3    lovastatin (MEVACOR) 40 MG tablet Take 1 tablet by mouth nightly 90 tablet 3    metoprolol tartrate (LOPRESSOR) 25 MG tablet TAKE 1 TABLET BY MOUTH TWICE DAILY 180 tablet 3    colchicine (COLCRYS) 0.6 MG tablet Take 2 tabs daily for 3 days then 1 tab daily thereafter 90 tablet 0    allopurinol (ZYLOPRIM) 100 MG tablet Take 2 tablets by mouth daily Starting July, 1 2019 60 tablet 5    Compression Stockings MISC by Does not apply route Low to medium compression stocking just below knee 1 each 0    multivitamin (THERAGRAN) per tablet Take 1 tablet by mouth daily.         aspirin 81 MG EC tablet Take 81 mg by mouth daily.  fish oil-omega-3 fatty acids 1000 MG capsule Take 2 g by mouth daily. No current facility-administered medications for this visit. No Known Allergies    Past Surgical History:   Procedure Laterality Date    ANUS SURGERY      polyp removed    BREAST BIOPSY      Right-benign    COLONOSCOPY      KNEE SURGERY      bilateral replacement    RECTAL SURGERY  8/24/2012    Rigid proctoscopy with transanal excision of rectal polyp       Social History     Tobacco Use    Smoking status: Never Smoker    Smokeless tobacco: Never Used   Substance Use Topics    Alcohol use: No    Drug use: No       Family History   Problem Relation Age of Onset    Heart Disease Mother     High Blood Pressure Mother     Heart Disease Father     High Blood Pressure Father        BP (!) 142/94 (Site: Right Upper Arm)   Pulse 94   Temp 98.2 °F (36.8 °C) (Temporal)   Ht 5' (1.524 m)   Wt 193 lb 12.8 oz (87.9 kg)   SpO2 96%   BMI 37.85 kg/m²     Physical Exam   Constitutional: She is oriented to person, place, and time. She appears well-developed and well-nourished. She does not have a sickly appearance. She does not appear ill. She appears distressed. HENT:   Head: Normocephalic and atraumatic. Cardiovascular: Normal rate, regular rhythm and normal heart sounds. No murmur heard. Pulmonary/Chest: Effort normal and breath sounds normal. No respiratory distress. She has no wheezes. She has no rales. Abdominal: Soft. Bowel sounds are normal. She exhibits no distension. There is no tenderness. Musculoskeletal:   No pretibial edema b/l. Feet:   Right Foot:   Skin Integrity: Positive for erythema (erythema from the firsteerythema from the first metatarsal phalangeal joint to the mid metatarsals of the first through third). Negative for ulcer or blister. Neurological: She is alert and oriented to person, place, and time.  Gait abnormal. Coordination normal.   Skin: Skin is warm and dry. She is not diaphoretic. No cyanosis. Nursing note and vitals reviewed. Assessment:    ICD-10-CM    1. Routine general medical examination at a health care facility Z00.00    2. Essential hypertension I10 lisinopril (PRINIVIL;ZESTRIL) 40 MG tablet     CBC     Comprehensive Metabolic Panel     Urinalysis     TSH 3RD GENERATION   3. Mixed hyperlipidemia E78.2 lovastatin (MEVACOR) 40 MG tablet     CBC     Comprehensive Metabolic Panel     Urinalysis     TSH 3RD GENERATION   4. Acute gout due to renal impairment involving toe of left foot M10.372        Plan:   Gout is very severe and uncontrolled, we will start treatment. She is to start allopurinol about 6 weeks post flare.   Continue treatment for other chronic disease  Orders Placed This Encounter   Procedures    CBC     Standing Status:   Standing     Number of Occurrences:   15     Standing Expiration Date:   4/27/2030    Comprehensive Metabolic Panel     Every 11 months     Standing Status:   Standing     Number of Occurrences:   15     Standing Expiration Date:   4/27/2030    Urinalysis     Standing Status:   Standing     Number of Occurrences:   15     Standing Expiration Date:   5/12/2029    TSH 3RD GENERATION     Standing Status:   Standing     Number of Occurrences:   15     Standing Expiration Date:   4/27/2030     Orders Placed This Encounter   Medications    lisinopril (PRINIVIL;ZESTRIL) 40 MG tablet     Sig: Take 1 tablet by mouth daily     Dispense:  90 tablet     Refill:  3    lovastatin (MEVACOR) 40 MG tablet     Sig: Take 1 tablet by mouth nightly     Dispense:  90 tablet     Refill:  3    metoprolol tartrate (LOPRESSOR) 25 MG tablet     Sig: TAKE 1 TABLET BY MOUTH TWICE DAILY     Dispense:  180 tablet     Refill:  3     Please consider 90 day supplies to promote better adherence    methylPREDNISolone sodium (SOLU-MEDROL) injection 80 mg    colchicine (COLCRYS) 0.6 MG tablet     Sig: Take 2 tabs daily for 3 days then 1 tab daily thereafter     Dispense:  90 tablet     Refill:  0    allopurinol (ZYLOPRIM) 100 MG tablet     Sig: Take 2 tablets by mouth daily Starting July, 1 2019     Dispense:  60 tablet     Refill:  5        Medications Discontinued During This Encounter   Medication Reason    lisinopril (PRINIVIL;ZESTRIL) 40 MG tablet REORDER    lovastatin (MEVACOR) 40 MG tablet REORDER    metoprolol tartrate (LOPRESSOR) 25 MG tablet REORDER     Patient Instructions     Personalized Preventive Plan for Willie Louise - 5/15/2019  Medicare offers a range of preventive health benefits. Some of the tests and screenings are paid in full while other may be subject to a deductible, co-insurance, and/or copay. Some of these benefits include a comprehensive review of your medical history including lifestyle, illnesses that may run in your family, and various assessments and screenings as appropriate. After reviewing your medical record and screening and assessments performed today your provider may have ordered immunizations, labs, imaging, and/or referrals for you. A list of these orders (if applicable) as well as your Preventive Care list are included within your After Visit Summary for your review. Other Preventive Recommendations:    · A preventive eye exam performed by an eye specialist is recommended every 1-2 years to screen for glaucoma; cataracts, macular degeneration, and other eye disorders. · A preventive dental visit is recommended every 6 months. · Try to get at least 150 minutes of exercise per week or 10,000 steps per day on a pedometer . · Order or download the FREE \"Exercise & Physical Activity: Your Everyday Guide\" from [employer]. Call 1-630.848.6809 or search [employer] online. · You need 0144-1130 mg of calcium and 6074-8910 IU of vitamin D per day.  It is possible to meet your calcium requirement with diet alone, but a vitamin D supplement is usually

## 2019-05-15 NOTE — PATIENT INSTRUCTIONS
Personalized Preventive Plan for Tammy Haji - 5/15/2019  Medicare offers a range of preventive health benefits. Some of the tests and screenings are paid in full while other may be subject to a deductible, co-insurance, and/or copay. Some of these benefits include a comprehensive review of your medical history including lifestyle, illnesses that may run in your family, and various assessments and screenings as appropriate. After reviewing your medical record and screening and assessments performed today your provider may have ordered immunizations, labs, imaging, and/or referrals for you. A list of these orders (if applicable) as well as your Preventive Care list are included within your After Visit Summary for your review. Other Preventive Recommendations:    · A preventive eye exam performed by an eye specialist is recommended every 1-2 years to screen for glaucoma; cataracts, macular degeneration, and other eye disorders. · A preventive dental visit is recommended every 6 months. · Try to get at least 150 minutes of exercise per week or 10,000 steps per day on a pedometer . · Order or download the FREE \"Exercise & Physical Activity: Your Everyday Guide\" from The [employer]. Call 3-207.521.8376 or search The [employer] online. · You need 4807-7959 mg of calcium and 9790-2975 IU of vitamin D per day. It is possible to meet your calcium requirement with diet alone, but a vitamin D supplement is usually necessary to meet this goal.  · When exposed to the sun, use a sunscreen that protects against both UVA and UVB radiation with an SPF of 30 or greater. Reapply every 2 to 3 hours or after sweating, drying off with a towel, or swimming. · Always wear a seat belt when traveling in a car. Always wear a helmet when riding a bicycle or motorcycle. Personalized Preventive Plan for Tammy Haji - 5/15/2019  Medicare offers a range of preventive health benefits. Some of the tests and screenings are paid in full while other may be subject to a deductible, co-insurance, and/or copay. Some of these benefits include a comprehensive review of your medical history including lifestyle, illnesses that may run in your family, and various assessments and screenings as appropriate. After reviewing your medical record and screening and assessments performed today your provider may have ordered immunizations, labs, imaging, and/or referrals for you. A list of these orders (if applicable) as well as your Preventive Care list are included within your After Visit Summary for your review. Other Preventive Recommendations:    A preventive eye exam performed by an eye specialist is recommended every 1-2 years to screen for glaucoma; cataracts, macular degeneration, and other eye disorders. A preventive dental visit is recommended every 6 months. Try to get at least 150 minutes of exercise per week or 10,000 steps per day on a pedometer . Order or download the FREE \"Exercise & Physical Activity: Your Everyday Guide\" from [employer]. Call 2-734.470.9020 or search The [employer] online. You need 8562-9482 mg of calcium and 6615-0557 IU of vitamin D per day. It is possible to meet your calcium requirement with diet alone, but a vitamin D supplement is usually necessary to meet this goal.  When exposed to the sun, use a sunscreen that protects against both UVA and UVB radiation with an SPF of 30 or greater. Reapply every 2 to 3 hours or after sweating, drying off with a towel, or swimming. Always wear a seat belt when traveling in a car. Always wear a helmet when riding a bicycle or motorcycle.

## 2019-05-15 NOTE — PROGRESS NOTES
After obtaining consent, and per orders of Dr. Schmidt Stable injection of Solu-Medrol 80mg was given in the Left Dorsalgluteal by Marika Juarez, [address] Pt tolerated well and had no reactions. Pt was released for office.

## 2019-05-17 LAB — TSH, 3RD GENERATION: 1.22 MU/L (ref 0.3–4)

## 2019-05-20 ENCOUNTER — TELEPHONE (OUTPATIENT)
Dept: PRIMARY CARE CLINIC | Age: 74
End: 2019-05-20

## 2019-05-22 ASSESSMENT — ENCOUNTER SYMPTOMS
DIARRHEA: 0
SHORTNESS OF BREATH: 0
VOMITING: 0
COUGH: 0
CHEST TIGHTNESS: 0
WHEEZING: 0
ABDOMINAL PAIN: 0
CONSTIPATION: 0
NAUSEA: 0

## 2019-11-18 ENCOUNTER — OFFICE VISIT (OUTPATIENT)
Dept: PRIMARY CARE CLINIC | Age: 74
End: 2019-11-18
Payer: MEDICARE

## 2019-11-18 VITALS
SYSTOLIC BLOOD PRESSURE: 132 MMHG | WEIGHT: 196.4 LBS | HEIGHT: 60 IN | DIASTOLIC BLOOD PRESSURE: 86 MMHG | HEART RATE: 78 BPM | OXYGEN SATURATION: 98 % | RESPIRATION RATE: 14 BRPM | BODY MASS INDEX: 38.56 KG/M2 | TEMPERATURE: 97.1 F

## 2019-11-18 DIAGNOSIS — I10 ESSENTIAL HYPERTENSION: Primary | ICD-10-CM

## 2019-11-18 DIAGNOSIS — E66.01 CLASS 2 SEVERE OBESITY DUE TO EXCESS CALORIES WITH SERIOUS COMORBIDITY AND BODY MASS INDEX (BMI) OF 38.0 TO 38.9 IN ADULT (HCC): ICD-10-CM

## 2019-11-18 PROBLEM — E66.812 CLASS 2 SEVERE OBESITY DUE TO EXCESS CALORIES WITH SERIOUS COMORBIDITY AND BODY MASS INDEX (BMI) OF 38.0 TO 38.9 IN ADULT: Status: ACTIVE | Noted: 2019-11-18

## 2019-11-18 PROCEDURE — 3017F COLORECTAL CA SCREEN DOC REV: CPT | Performed by: FAMILY MEDICINE

## 2019-11-18 PROCEDURE — 1036F TOBACCO NON-USER: CPT | Performed by: FAMILY MEDICINE

## 2019-11-18 PROCEDURE — 1090F PRES/ABSN URINE INCON ASSESS: CPT | Performed by: FAMILY MEDICINE

## 2019-11-18 PROCEDURE — G8484 FLU IMMUNIZE NO ADMIN: HCPCS | Performed by: FAMILY MEDICINE

## 2019-11-18 PROCEDURE — 4040F PNEUMOC VAC/ADMIN/RCVD: CPT | Performed by: FAMILY MEDICINE

## 2019-11-18 PROCEDURE — G8400 PT W/DXA NO RESULTS DOC: HCPCS | Performed by: FAMILY MEDICINE

## 2019-11-18 PROCEDURE — 1123F ACP DISCUSS/DSCN MKR DOCD: CPT | Performed by: FAMILY MEDICINE

## 2019-11-18 PROCEDURE — G8417 CALC BMI ABV UP PARAM F/U: HCPCS | Performed by: FAMILY MEDICINE

## 2019-11-18 PROCEDURE — 99213 OFFICE O/P EST LOW 20 MIN: CPT | Performed by: FAMILY MEDICINE

## 2019-11-18 PROCEDURE — G8427 DOCREV CUR MEDS BY ELIG CLIN: HCPCS | Performed by: FAMILY MEDICINE

## 2019-11-18 ASSESSMENT — ENCOUNTER SYMPTOMS
COUGH: 0
WHEEZING: 0
VOMITING: 0
SHORTNESS OF BREATH: 0
DIARRHEA: 0
CONSTIPATION: 0
ABDOMINAL PAIN: 0
CHEST TIGHTNESS: 0
NAUSEA: 0

## 2020-04-15 ENCOUNTER — TELEPHONE (OUTPATIENT)
Dept: INTERNAL MEDICINE | Age: 75
End: 2020-04-15

## 2020-04-15 NOTE — TELEPHONE ENCOUNTER
Pt called and stated that she fell and thinks she may have broken her right arm. MEDICATIONS:  No orders of the defined types were placed in this encounter.       ORDERS:  Orders Placed This Encounter   Procedures    XR HUMERUS RIGHT (MIN 2 VIEWS)

## 2020-04-16 ENCOUNTER — TELEPHONE (OUTPATIENT)
Dept: PRIMARY CARE CLINIC | Age: 75
End: 2020-04-16

## 2020-04-16 NOTE — TELEPHONE ENCOUNTER
Patient called and xray done at [address] and she has a fracture right arm.  Discussed and will go to [address] [place] [address] in clinic

## 2020-06-18 RX ORDER — ALLOPURINOL 100 MG/1
TABLET ORAL
Qty: 180 TABLET | Refills: 1 | Status: SHIPPED | OUTPATIENT
Start: 2020-06-18 | End: 2020-07-07 | Stop reason: SDUPTHER

## 2020-07-07 ENCOUNTER — VIRTUAL VISIT (OUTPATIENT)
Dept: PRIMARY CARE CLINIC | Age: 75
End: 2020-07-07
Payer: MEDICARE

## 2020-07-07 PROCEDURE — G8428 CUR MEDS NOT DOCUMENT: HCPCS | Performed by: FAMILY MEDICINE

## 2020-07-07 PROCEDURE — 1090F PRES/ABSN URINE INCON ASSESS: CPT | Performed by: FAMILY MEDICINE

## 2020-07-07 PROCEDURE — G8400 PT W/DXA NO RESULTS DOC: HCPCS | Performed by: FAMILY MEDICINE

## 2020-07-07 PROCEDURE — 1123F ACP DISCUSS/DSCN MKR DOCD: CPT | Performed by: FAMILY MEDICINE

## 2020-07-07 PROCEDURE — 1036F TOBACCO NON-USER: CPT | Performed by: FAMILY MEDICINE

## 2020-07-07 PROCEDURE — 3017F COLORECTAL CA SCREEN DOC REV: CPT | Performed by: FAMILY MEDICINE

## 2020-07-07 PROCEDURE — G0439 PPPS, SUBSEQ VISIT: HCPCS | Performed by: FAMILY MEDICINE

## 2020-07-07 PROCEDURE — 4040F PNEUMOC VAC/ADMIN/RCVD: CPT | Performed by: FAMILY MEDICINE

## 2020-07-07 PROCEDURE — G8417 CALC BMI ABV UP PARAM F/U: HCPCS | Performed by: FAMILY MEDICINE

## 2020-07-07 PROCEDURE — 99213 OFFICE O/P EST LOW 20 MIN: CPT | Performed by: FAMILY MEDICINE

## 2020-07-07 RX ORDER — COLCHICINE 0.6 MG/1
TABLET ORAL
Qty: 90 TABLET | Refills: 0 | Status: SHIPPED | OUTPATIENT
Start: 2020-07-07 | End: 2021-04-23

## 2020-07-07 RX ORDER — LOVASTATIN 40 MG/1
40 TABLET ORAL NIGHTLY
Qty: 90 TABLET | Refills: 3 | Status: SHIPPED | OUTPATIENT
Start: 2020-07-07 | End: 2020-08-24

## 2020-07-07 RX ORDER — ALLOPURINOL 100 MG/1
200 TABLET ORAL DAILY
Qty: 180 TABLET | Refills: 3 | Status: SHIPPED | OUTPATIENT
Start: 2020-07-07 | End: 2021-09-14

## 2020-07-07 RX ORDER — LISINOPRIL 40 MG/1
40 TABLET ORAL DAILY
Qty: 90 TABLET | Refills: 3 | Status: SHIPPED | OUTPATIENT
Start: 2020-07-07 | End: 2020-08-24

## 2020-07-07 ASSESSMENT — ENCOUNTER SYMPTOMS
WHEEZING: 0
COUGH: 0
CHEST TIGHTNESS: 0
DIARRHEA: 0
NAUSEA: 0
ABDOMINAL PAIN: 0
SHORTNESS OF BREATH: 0
VOMITING: 0
CONSTIPATION: 0

## 2020-07-07 NOTE — PROGRESS NOTES
2020    TELEHEALTH EVALUATION -- Audio/Visual (During [facility]-97 public health emergency)    HPI:    Ciera Delgado (:  1945) has requested an audio/video evaluation for the following concern(s):    Patient presents today via video visit for AWV and recent fracture. She notes she had a bad fracture of the R humerus and had pain for some time. She is doing PT. Denies any falls. She remains on tx for gout and htn and denies any issues w/ meds or SE. Review of Systems   Constitutional: Negative for chills and fever. Respiratory: Negative for cough, chest tightness, shortness of breath and wheezing. Cardiovascular: Negative for chest pain, palpitations and leg swelling. Gastrointestinal: Negative for abdominal pain, constipation, diarrhea, nausea and vomiting. Genitourinary: Negative for difficulty urinating, dysuria and frequency. Musculoskeletal: Positive for arthralgias. Negative for joint swelling and neck pain. Prior to Visit Medications    Medication Sig Taking? Authorizing Provider   allopurinol (ZYLOPRIM) 100 MG tablet Take 2 tablets by mouth daily Yes Glen Nam MD   metoprolol tartrate (LOPRESSOR) 25 MG tablet Take 1 tablet by mouth 2 times daily Yes Glen Nam MD   lisinopril (PRINIVIL;ZESTRIL) 40 MG tablet Take 1 tablet by mouth daily Yes Glen Nam MD   lovastatin (MEVACOR) 40 MG tablet Take 1 tablet by mouth nightly Yes Glen Nam MD   colchicine (COLCRYS) 0.6 MG tablet Take 2 tabs daily for 3 days then 1 tab daily thereafter Yes Glen Nam MD   Compression Stockings MISC by Does not apply route Low to medium compression stocking just below knee  Glen Nam MD   multivitamin [facility]) per tablet Take 1 tablet by mouth daily. Historical Provider, MD   aspirin 81 MG EC tablet Take 81 mg by mouth daily. Historical Provider, MD   fish oil-omega-3 fatty acids 1000 MG capsule Take 2 g by mouth daily.     Historical Provider, MD       Social History     Tobacco Use    Smoking status: Never Smoker    Smokeless tobacco: Never Used   Substance Use Topics    Alcohol use: No    Drug use: No        No Known Allergies,   Past Medical History:   Diagnosis Date    Hyperlipidemia     Hypertension     Osteoarthritis    ,   Past Surgical History:   Procedure Laterality Date    ANUS SURGERY      polyp removed    BREAST BIOPSY      Right-benign    COLONOSCOPY      KNEE SURGERY      bilateral replacement    RECTAL SURGERY  8/24/2012    Rigid proctoscopy with transanal excision of rectal polyp   ,   Social History     Tobacco Use    Smoking status: Never Smoker    Smokeless tobacco: Never Used   Substance Use Topics    Alcohol use: No    Drug use: No   ,   Family History   Problem Relation Age of Onset    Heart Disease Mother     High Blood Pressure Mother     Heart Disease Father     High Blood Pressure Father    ,   Immunization History   Administered Date(s) Administered    DTaP 04/17/2015    Pneumococcal Conjugate 13-valent ([id number]) 04/17/2015    Pneumococcal Polysaccharide ([id number]) 04/17/2017    Zoster Recombinant (Shingrix) 04/17/2019   ,   Health Maintenance   Topic Date Due    Lipid screen  04/23/2019    Annual Wellness Visit (AWV)  05/29/2019    Shingles Vaccine (2 of 2) 06/12/2019    Potassium monitoring  05/15/2020    Creatinine monitoring  05/15/2020    Breast cancer screen  08/24/2020    Flu vaccine (1) 09/01/2020    DTaP/Tdap/Td vaccine (2 - Tdap) 04/17/2025    Colon cancer screen colonoscopy  08/30/2027    Pneumococcal 65+ years Vaccine  Completed    DEXA (modify frequency per FRAX score)  Addressed    Hepatitis C screen  Addressed    Hepatitis A vaccine  Aged Out    Hepatitis B vaccine  Aged Out    Hib vaccine  Aged Out    Meningococcal (ACWY) vaccine  Aged Out       PHYSICAL EXAMINATION:  [ INSTRUCTIONS:  \"[x]\" Indicates a positive item  \"[]\" Indicates a negative item  -- DELETE ALL ITEMS NOT EXAMINED]  Vital Signs: (As obtained by patient/caregiver or practitioner observation)    Blood pressure-  Heart rate-    Respiratory rate-    Temperature-  Pulse oximetry-     Constitutional: [x] Appears well-developed and well-nourished [] No apparent distress      [] Abnormal-   Mental status  [x] Alert and awake  [x] Oriented to person/place/time [x]Able to follow commands      Eyes:  EOM    [x]  Normal  [] Abnormal-  Sclera  [x]  Normal  [] Abnormal -         Discharge []  None visible  [] Abnormal -    HENT:   [x] Normocephalic, atraumatic. [] Abnormal   [x] Mouth/Throat: Mucous membranes are moist.     External Ears [x] Normal  [] Abnormal-     Neck: [x] No visualized mass     Pulmonary/Chest: [x] Respiratory effort normal.  [x] No visualized signs of difficulty breathing or respiratory distress        [] Abnormal-      Musculoskeletal:   [x] Normal gait with no signs of ataxia         [x] Normal range of motion of neck        [] Abnormal-       Neurological:        [x] No Facial Asymmetry (Cranial nerve 7 motor function) (limited exam to video visit)          [x] No gaze palsy        [] Abnormal-         Skin:        [x] No significant exanthematous lesions or discoloration noted on facial skin         [] Abnormal-            Psychiatric:       [x] Normal Affect [x] No Hallucinations        [] Abnormal-     Other pertinent observable physical exam findings-     ASSESSMENT/PLAN:    ICD-10-CM    1. Routine general medical examination at a health care facility Z00.00    2. Chronic gout of multiple sites, unspecified cause M1A. 09X0    3. Essential hypertension I10 lisinopril (PRINIVIL;ZESTRIL) 40 MG tablet   4. Mixed hyperlipidemia E78.2 lovastatin (MEVACOR) 40 MG tablet   5. Class 2 severe obesity due to excess calories with serious comorbidity and body mass index (BMI) of 38.0 to 38.9 in adult ([facility]) E66.01     Z68.38    6.  History of hysterectomy for cancer ([facility] [address] Z90.710     C80.1        High risk for falls, healing from fracture. Reevaluated chronci conditions today; stable at this time. Orders Placed This Encounter   Medications    allopurinol (ZYLOPRIM) 100 MG tablet     Sig: Take 2 tablets by mouth daily     Dispense:  180 tablet     Refill:  3    metoprolol tartrate (LOPRESSOR) 25 MG tablet     Sig: Take 1 tablet by mouth 2 times daily     Dispense:  180 tablet     Refill:  3    lisinopril (PRINIVIL;ZESTRIL) 40 MG tablet     Sig: Take 1 tablet by mouth daily     Dispense:  90 tablet     Refill:  3    lovastatin (MEVACOR) 40 MG tablet     Sig: Take 1 tablet by mouth nightly     Dispense:  90 tablet     Refill:  3    colchicine (COLCRYS) 0.6 MG tablet     Sig: Take 2 tabs daily for 3 days then 1 tab daily thereafter     Dispense:  90 tablet     Refill:  0       No orders of the defined types were placed in this encounter. Return in about 6 months (around 1/7/2021) for OV - f/u htn (M-T am). Nic Arriaza is a 69 y.o. female being evaluated by a Virtual Visit (video visit) encounter to address concerns as mentioned above. A caregiver was present when appropriate. Due to this being a TeleHealth encounter (During [facility]-61 public health emergency), evaluation of the following organ systems was limited: Vitals/Constitutional/EENT/Resp/CV/GI//MS/Neuro/Skin/Heme-Lymph-Imm. Pursuant to the emergency declaration under the [address] [place] [address], [address] [place] [address] authority and the [employer] Act, this Virtual Visit was conducted with patient's (and/or legal guardian's) consent, to reduce the patient's risk of exposure to COVID-19 and provide necessary medical care. The patient (and/or legal guardian) has also been advised to contact this office for worsening conditions or problems, and seek emergency medical treatment and/or call 911 if deemed necessary.      Services were provided through a video synchronous discussion virtually to substitute for in-person clinic visit. Patient and provider were located at their individual homes or provider at secure site for business. --Sonia Rosas MD on 7/7/2020 at 1:36 PM    An electronic signature was used to authenticate this note.

## 2020-07-07 NOTE — PROGRESS NOTES
Medicare Annual Wellness Visit  Name: Ajay Bartlett Date: 2020   MRN: [id number] Sex: Female   Age: 69 y.o. Ethnicity: Non-/Non    : 1945 Race: Minerva Carmen is here for Medicare AWV and Pain    Screenings for behavioral, psychosocial and functional/safety risks, and cognitive dysfunction are all negative except as indicated below. These results, as well as other patient data from the [address] form, are documented in Flowsheets linked to this Encounter. No Known Allergies    Prior to Visit Medications    Medication Sig Taking? Authorizing Provider   allopurinol (ZYLOPRIM) 100 MG tablet Take 2 tablets by mouth once daily  Troy Alcazar MD   metoprolol tartrate (LOPRESSOR) 25 MG tablet Take 1 tablet by mouth twice daily  Troy Alcazar MD   lisinopril (PRINIVIL;ZESTRIL) 40 MG tablet Take 1 tablet by mouth once daily  Troy Alcazar MD   lovastatin (MEVACOR) 40 MG tablet Take 1 tablet by mouth nightly  Troy Alcazar MD   colchicine (COLCRYS) 0.6 MG tablet Take 2 tabs daily for 3 days then 1 tab daily thereafter  Patient not taking: Reported on 2019  Troy Alcazar MD   Compression Stockings MISC by Does not apply route Low to medium compression stocking just below knee  Troy Alcazar MD   multivitamin [facility]) per tablet Take 1 tablet by mouth daily. Historical Provider, MD   aspirin 81 MG EC tablet Take 81 mg by mouth daily. Historical Provider, MD   fish oil-omega-3 fatty acids 1000 MG capsule Take 2 g by mouth daily.     Historical Provider, MD       Past Medical History:   Diagnosis Date    Hyperlipidemia     Hypertension     Osteoarthritis        Past Surgical History:   Procedure Laterality Date    ANUS SURGERY      polyp removed    BREAST BIOPSY      Right-benign    COLONOSCOPY      KNEE SURGERY      bilateral replacement    RECTAL SURGERY  2012    Rigid proctoscopy with transanal excision of rectal polyp       Family History   Problem Relation Age of Onset    Heart Disease Mother     High Blood Pressure Mother     Heart Disease Father     High Blood Pressure Father        CareTeam (Including outside providers/suppliers regularly involved in providing care):   Patient Care Team:  Bernarda Gibbs MD as PCP - General (Family Medicine)  Bernarda Gibbs MD [facility]    Wt Readings from Last 3 Encounters:   11/18/19 196 lb 6.4 oz (89.1 kg)   05/15/19 193 lb 12.8 oz (87.9 kg)   10/29/18 191 lb (86.6 kg)     There were no vitals filed for this visit. There is no height or weight on file to calculate BMI. Based upon direct observation of the patient, evaluation of cognition reveals recent and remote memory intact. Patient's complete Health Risk Assessment and screening values have been reviewed and are found in Flowsheets. The following problems were reviewed today and where indicated follow up appointments were made and/or referrals ordered. Positive Risk Factor Screenings with Interventions:     Fall Risk:  2 or more falls in past year?: no  Fall with injury in past year?: (!) yes  Fall Risk Interventions:    · Home safety tips provided    General Health:  General  In general, how would you say your health is?: Good  In the past 7 days, have you experienced any of the following?  New or Increased Pain, New or Increased Fatigue, Loneliness, Social Isolation, Stress or Anger?: None of These  Do you get the social and emotional support that you need?: Yes  Do you have a Living Will?: (!) No  General Health Risk Interventions:  · No Living Will: provided the state-specific advance directive document to the patient    Health Habits/Nutrition:  Health Habits/Nutrition  Do you exercise for at least 20 minutes 2-3 times per week?: Yes  Have you lost any weight without trying in the past 3 months?: No  Do you eat fewer than 2 meals per day?: No  Have you seen a dentist within the past year?: Yes  There is facility    Chronic gout of multiple sites, unspecified cause    Essential hypertension    Mixed hyperlipidemia              Alejandra Aguilar is a 69 y.o. female being evaluated by a Virtual Visit (video and audio) encounter to address concerns as mentioned above. A caregiver was present when appropriate. Due to this being a TeleHealth encounter (During [facility]-76 public health emergency), evaluation of the following organ systems was limited: Vitals/Constitutional/EENT/Resp/CV/GI//MS/Neuro/Skin/Heme-Lymph-Imm. Pursuant to the emergency declaration under the [address] [place] [address], [address] [place] [address] authority and the [employer] Act, this Virtual Visit was conducted with patient's (and/or legal guardian's) consent, to reduce the patient's risk of exposure to COVID-19 and provide necessary medical care. The patient (and/or legal guardian) has also been advised to contact this office for worsening conditions or problems, and seek emergency medical treatment and/or call 911 if deemed necessary. Patient identification was verified at the start of the visit: Yes    Services were provided through a video synchronous discussion virtually to substitute for in-person clinic visit. Patient and provider were located at their individual homes. --Jayla Malik MD on 7/7/2020 at 1:01 PM    An electronic signature was used to authenticate this note.

## 2020-07-07 NOTE — PATIENT INSTRUCTIONS
Personalized Preventive Plan for Mariola Jara - 7/7/2020  Medicare offers a range of preventive health benefits. Some of the tests and screenings are paid in full while other may be subject to a deductible, co-insurance, and/or copay. Some of these benefits include a comprehensive review of your medical history including lifestyle, illnesses that may run in your family, and various assessments and screenings as appropriate. After reviewing your medical record and screening and assessments performed today your provider may have ordered immunizations, labs, imaging, and/or referrals for you. A list of these orders (if applicable) as well as your Preventive Care list are included within your After Visit Summary for your review. Other Preventive Recommendations:    · A preventive eye exam performed by an eye specialist is recommended every 1-2 years to screen for glaucoma; cataracts, macular degeneration, and other eye disorders. · A preventive dental visit is recommended every 6 months. · Try to get at least 150 minutes of exercise per week or 10,000 steps per day on a pedometer . · Order or download the FREE \"Exercise & Physical Activity: Your Everyday Guide\" from [employer]. Call 0-610.302.9143 or search [employer] online. · You need 7648-1934 mg of calcium and 4919-0694 IU of vitamin D per day. It is possible to meet your calcium requirement with diet alone, but a vitamin D supplement is usually necessary to meet this goal.  · When exposed to the sun, use a sunscreen that protects against both UVA and UVB radiation with an SPF of 30 or greater. Reapply every 2 to 3 hours or after sweating, drying off with a towel, or swimming. · Always wear a seat belt when traveling in a car. Always wear a helmet when riding a bicycle or motorcycle.

## 2020-07-08 DIAGNOSIS — E78.2 MIXED HYPERLIPIDEMIA: ICD-10-CM

## 2020-07-08 DIAGNOSIS — I10 ESSENTIAL HYPERTENSION: ICD-10-CM

## 2020-07-08 LAB
ALBUMIN SERPL-MCNC: 4.3 G/DL (ref 3.5–5.2)
ALP BLD-CCNC: 93 U/L (ref 35–104)
ALT SERPL-CCNC: 28 U/L (ref 5–33)
ANION GAP SERPL CALCULATED.3IONS-SCNC: 15 MMOL/L (ref 7–19)
AST SERPL-CCNC: 28 U/L (ref 5–32)
BACTERIA: NEGATIVE /HPF
BILIRUB SERPL-MCNC: 0.6 MG/DL (ref 0.2–1.2)
BILIRUBIN URINE: NEGATIVE
BLOOD, URINE: NEGATIVE
BUN BLDV-MCNC: 15 MG/DL (ref 8–23)
CALCIUM SERPL-MCNC: 9.6 MG/DL (ref 8.8–10.2)
CHLORIDE BLD-SCNC: 100 MMOL/L (ref 98–111)
CLARITY: CLEAR
CO2: 27 MMOL/L (ref 22–29)
COLOR: YELLOW
CREAT SERPL-MCNC: 0.7 MG/DL (ref 0.5–0.9)
EPITHELIAL CELLS, UA: 3 /HPF (ref 0–5)
GFR NON-AFRICAN AMERICAN: >60
GLUCOSE BLD-MCNC: 93 MG/DL (ref 74–109)
GLUCOSE URINE: NEGATIVE MG/DL
HCT VFR BLD CALC: 44.9 % (ref 37–47)
HEMOGLOBIN: 14.7 G/DL (ref 12–16)
HYALINE CASTS: 0 /HPF (ref 0–8)
KETONES, URINE: NEGATIVE MG/DL
LEUKOCYTE ESTERASE, URINE: ABNORMAL
MCH RBC QN AUTO: 30 PG (ref 27–31)
MCHC RBC AUTO-ENTMCNC: 32.7 G/DL (ref 33–37)
MCV RBC AUTO: 91.6 FL (ref 81–99)
NITRITE, URINE: NEGATIVE
PDW BLD-RTO: 13.1 % (ref 11.5–14.5)
PH UA: 7 (ref 5–8)
PLATELET # BLD: 173 K/UL (ref 130–400)
PMV BLD AUTO: 10.8 FL (ref 9.4–12.3)
POTASSIUM SERPL-SCNC: 4.4 MMOL/L (ref 3.5–5)
PROTEIN UA: NEGATIVE MG/DL
RBC # BLD: 4.9 M/UL (ref 4.2–5.4)
RBC UA: 1 /HPF (ref 0–4)
SODIUM BLD-SCNC: 142 MMOL/L (ref 136–145)
SPECIFIC GRAVITY UA: 1.01 (ref 1–1.03)
TOTAL PROTEIN: 7.3 G/DL (ref 6.6–8.7)
TSH SERPL DL<=0.05 MIU/L-ACNC: 1.94 UIU/ML (ref 0.27–4.2)
UROBILINOGEN, URINE: 0.2 E.U./DL
WBC # BLD: 6.4 K/UL (ref 4.8–10.8)
WBC UA: 8 /HPF (ref 0–5)

## 2020-08-24 RX ORDER — LOVASTATIN 40 MG/1
40 TABLET ORAL NIGHTLY
Qty: 90 TABLET | Refills: 0 | Status: SHIPPED | OUTPATIENT
Start: 2020-08-24 | End: 2021-08-22

## 2020-08-24 RX ORDER — LISINOPRIL 40 MG/1
TABLET ORAL
Qty: 90 TABLET | Refills: 0 | Status: SHIPPED | OUTPATIENT
Start: 2020-08-24 | End: 2021-08-22

## 2021-01-12 ENCOUNTER — OFFICE VISIT (OUTPATIENT)
Dept: PRIMARY CARE CLINIC | Age: 76
End: 2021-01-12
Payer: MEDICARE

## 2021-01-12 VITALS
HEIGHT: 60 IN | BODY MASS INDEX: 38.52 KG/M2 | WEIGHT: 196.2 LBS | DIASTOLIC BLOOD PRESSURE: 80 MMHG | SYSTOLIC BLOOD PRESSURE: 134 MMHG | TEMPERATURE: 98.2 F | OXYGEN SATURATION: 97 % | HEART RATE: 64 BPM

## 2021-01-12 DIAGNOSIS — M15.9 PRIMARY OSTEOARTHRITIS INVOLVING MULTIPLE JOINTS: ICD-10-CM

## 2021-01-12 DIAGNOSIS — E78.2 MIXED HYPERLIPIDEMIA: ICD-10-CM

## 2021-01-12 DIAGNOSIS — E66.01 CLASS 2 SEVERE OBESITY DUE TO EXCESS CALORIES WITH SERIOUS COMORBIDITY AND BODY MASS INDEX (BMI) OF 38.0 TO 38.9 IN ADULT (HCC): ICD-10-CM

## 2021-01-12 DIAGNOSIS — Z91.81 AT HIGH RISK FOR FALLS: Primary | ICD-10-CM

## 2021-01-12 DIAGNOSIS — C80.1 MALIGNANT (PRIMARY) NEOPLASM, UNSPECIFIED (HCC): ICD-10-CM

## 2021-01-12 PROBLEM — M15.0 PRIMARY OSTEOARTHRITIS INVOLVING MULTIPLE JOINTS: Status: ACTIVE | Noted: 2021-01-12

## 2021-01-12 PROBLEM — E66.812 CLASS 2 SEVERE OBESITY DUE TO EXCESS CALORIES WITH SERIOUS COMORBIDITY AND BODY MASS INDEX (BMI) OF 38.0 TO 38.9 IN ADULT: Chronic | Status: ACTIVE | Noted: 2019-11-18

## 2021-01-12 PROBLEM — I10 ESSENTIAL HYPERTENSION: Chronic | Status: ACTIVE | Noted: 2017-03-31

## 2021-01-12 PROCEDURE — 4004F PT TOBACCO SCREEN RCVD TLK: CPT | Performed by: FAMILY MEDICINE

## 2021-01-12 PROCEDURE — 99214 OFFICE O/P EST MOD 30 MIN: CPT | Performed by: FAMILY MEDICINE

## 2021-01-12 PROCEDURE — G8427 DOCREV CUR MEDS BY ELIG CLIN: HCPCS | Performed by: FAMILY MEDICINE

## 2021-01-12 PROCEDURE — 4040F PNEUMOC VAC/ADMIN/RCVD: CPT | Performed by: FAMILY MEDICINE

## 2021-01-12 PROCEDURE — G8400 PT W/DXA NO RESULTS DOC: HCPCS | Performed by: FAMILY MEDICINE

## 2021-01-12 PROCEDURE — 3017F COLORECTAL CA SCREEN DOC REV: CPT | Performed by: FAMILY MEDICINE

## 2021-01-12 PROCEDURE — 1090F PRES/ABSN URINE INCON ASSESS: CPT | Performed by: FAMILY MEDICINE

## 2021-01-12 PROCEDURE — G8482 FLU IMMUNIZE ORDER/ADMIN: HCPCS | Performed by: FAMILY MEDICINE

## 2021-01-12 PROCEDURE — 1123F ACP DISCUSS/DSCN MKR DOCD: CPT | Performed by: FAMILY MEDICINE

## 2021-01-12 PROCEDURE — G8417 CALC BMI ABV UP PARAM F/U: HCPCS | Performed by: FAMILY MEDICINE

## 2021-01-12 ASSESSMENT — ENCOUNTER SYMPTOMS
VOMITING: 0
DIARRHEA: 0
SHORTNESS OF BREATH: 0
CHEST TIGHTNESS: 0
NAUSEA: 0
CONSTIPATION: 0
WHEEZING: 0
ABDOMINAL PAIN: 0
COUGH: 0

## 2021-01-12 ASSESSMENT — PATIENT HEALTH QUESTIONNAIRE - PHQ9: SUM OF ALL RESPONSES TO PHQ QUESTIONS 1-9: 0

## 2021-01-12 NOTE — PROGRESS NOTES
Marsha Whittington is a 77 y.o. female who presents today for   Chief Complaint   Patient presents with    Hypertension       HPI  Patient is here for htn. She notes she is cutting out on salt. She is having some shoulder pain. She had prior knee replacement. She notes she is doing well otherwise and using precautions for covid. Interested incovid vaccine. No change in PMH, family, social, or surgical history unless mentioned above. Review of Systems   Constitutional: Negative for chills and fever. Respiratory: Negative for cough, chest tightness, shortness of breath and wheezing. Cardiovascular: Negative for chest pain, palpitations and leg swelling. Gastrointestinal: Negative for abdominal pain, constipation, diarrhea, nausea and vomiting. Genitourinary: Negative for difficulty urinating, dysuria and frequency. Musculoskeletal: Positive for arthralgias and gait problem. Past Medical History:   Diagnosis Date    Hyperlipidemia     Hypertension     Osteoarthritis        Current Outpatient Medications   Medication Sig Dispense Refill    metoprolol tartrate (LOPRESSOR) 25 MG tablet Take 1 tablet by mouth twice daily 180 tablet 3    lisinopril (PRINIVIL;ZESTRIL) 40 MG tablet Take 1 tablet by mouth once daily 90 tablet 0    lovastatin (MEVACOR) 40 MG tablet Take 1 tablet by mouth nightly 90 tablet 0    allopurinol (ZYLOPRIM) 100 MG tablet Take 2 tablets by mouth daily 180 tablet 3    colchicine (COLCRYS) 0.6 MG tablet Take 2 tabs daily for 3 days then 1 tab daily thereafter 90 tablet 0    Compression Stockings MISC by Does not apply route Low to medium compression stocking just below knee 1 each 0    multivitamin (THERAGRAN) per tablet Take 1 tablet by mouth daily.  aspirin 81 MG EC tablet Take 81 mg by mouth daily.  fish oil-omega-3 fatty acids 1000 MG capsule Take 2 g by mouth daily. No current facility-administered medications for this visit. Patient given educational handouts and has had all questions answered. Patient voices understanding and agrees to plans along with risks and benefits of plan. Patient isinstructed to continue prior meds, diet, and exercise plans unless instructed otherwise. Patient agrees to follow up as instructed and sooner if needed. Patient agrees to go to ER if condition becomes emergent. Notesmay be completed with dictation device and spelling errors may occur. Materials may be copied and pasted from a notepad outside of EMR, all of which, I, Dr. Vasyl Lehman MD, take sole intellectual ownership of and have approved adding to my note. Return in about 6 months (around 7/12/2021) for OV (M-Wa), annual 2 time slots. On the basis of positive falls risk screening, assessment and plan is as follows: home safety tips provided.

## 2021-01-12 NOTE — PATIENT INSTRUCTIONS
Use tylenol 500-1000 mg three times a day as needed for pain. We are committed to providing you with the best care possible. In order to help us achieve these goals please remember to bring all medications, herbal products, and over the counter supplements with you to each visit. If your provider has ordered testing for you, please be sure to follow up with our office if you have not received results within 7 days after the testing took place. *If you receive a survey after visiting one of our offices, please take time to share your experience concerning your physician office visit. These surveys are confidential and no health information about you is shared. We are eager to improve for you and we are counting on your feedback to help make that happen.

## 2021-04-19 ENCOUNTER — TELEPHONE (OUTPATIENT)
Dept: PRIMARY CARE CLINIC | Age: 76
End: 2021-04-19

## 2021-04-19 DIAGNOSIS — R92.8 ABNORMAL MAMMOGRAM OF LEFT BREAST: Primary | ICD-10-CM

## 2021-04-20 ENCOUNTER — OFFICE VISIT (OUTPATIENT)
Dept: SURGERY | Age: 76
End: 2021-04-20
Payer: MEDICARE

## 2021-04-20 VITALS
HEART RATE: 79 BPM | HEIGHT: 60 IN | DIASTOLIC BLOOD PRESSURE: 108 MMHG | TEMPERATURE: 98.7 F | BODY MASS INDEX: 39.46 KG/M2 | SYSTOLIC BLOOD PRESSURE: 163 MMHG | WEIGHT: 201 LBS

## 2021-04-20 DIAGNOSIS — R92.8 ABNORMAL MAMMOGRAM: Primary | ICD-10-CM

## 2021-04-20 PROCEDURE — 99203 OFFICE O/P NEW LOW 30 MIN: CPT | Performed by: PHYSICIAN ASSISTANT

## 2021-04-20 PROCEDURE — 4004F PT TOBACCO SCREEN RCVD TLK: CPT | Performed by: PHYSICIAN ASSISTANT

## 2021-04-20 PROCEDURE — G8400 PT W/DXA NO RESULTS DOC: HCPCS | Performed by: PHYSICIAN ASSISTANT

## 2021-04-20 PROCEDURE — 4040F PNEUMOC VAC/ADMIN/RCVD: CPT | Performed by: PHYSICIAN ASSISTANT

## 2021-04-20 PROCEDURE — G8417 CALC BMI ABV UP PARAM F/U: HCPCS | Performed by: PHYSICIAN ASSISTANT

## 2021-04-20 PROCEDURE — 1090F PRES/ABSN URINE INCON ASSESS: CPT | Performed by: PHYSICIAN ASSISTANT

## 2021-04-20 PROCEDURE — 1123F ACP DISCUSS/DSCN MKR DOCD: CPT | Performed by: PHYSICIAN ASSISTANT

## 2021-04-20 PROCEDURE — 3017F COLORECTAL CA SCREEN DOC REV: CPT | Performed by: PHYSICIAN ASSISTANT

## 2021-04-20 PROCEDURE — G8427 DOCREV CUR MEDS BY ELIG CLIN: HCPCS | Performed by: PHYSICIAN ASSISTANT

## 2021-04-20 RX ORDER — ACETAMINOPHEN 500 MG
1 TABLET ORAL DAILY
COMMUNITY

## 2021-04-26 ENCOUNTER — PROCEDURE VISIT (OUTPATIENT)
Dept: SURGERY | Age: 76
End: 2021-04-26
Payer: MEDICARE

## 2021-04-26 ENCOUNTER — HOSPITAL ENCOUNTER (OUTPATIENT)
Dept: WOMENS IMAGING | Age: 76
Discharge: HOME | End: 2021-04-26
Payer: MEDICARE

## 2021-04-26 DIAGNOSIS — R92.8 ABNORMAL MAMMOGRAM: ICD-10-CM

## 2021-04-26 DIAGNOSIS — R22.32 AXILLARY MASS, LEFT: ICD-10-CM

## 2021-04-26 DIAGNOSIS — N63.20 LEFT BREAST MASS: Primary | ICD-10-CM

## 2021-04-26 PROCEDURE — 88305 TISSUE EXAM BY PATHOLOGIST: CPT

## 2021-04-26 PROCEDURE — 88173 CYTOPATH EVAL FNA REPORT: CPT

## 2021-04-26 PROCEDURE — 88361 TUMOR IMMUNOHISTOCHEM/COMPUT: CPT

## 2021-04-26 PROCEDURE — 76942 ECHO GUIDE FOR BIOPSY: CPT | Performed by: SURGERY

## 2021-04-26 PROCEDURE — 19083 BX BREAST 1ST LESION US IMAG: CPT | Performed by: SURGERY

## 2021-04-26 PROCEDURE — 38505 NEEDLE BIOPSY LYMPH NODES: CPT | Performed by: SURGERY

## 2021-04-26 PROCEDURE — 88342 IMHCHEM/IMCYTCHM 1ST ANTB: CPT

## 2021-04-26 PROCEDURE — 88172 CYTP DX EVAL FNA 1ST EA SITE: CPT

## 2021-04-26 PROCEDURE — 77065 DX MAMMO INCL CAD UNI: CPT

## 2021-04-28 ENCOUNTER — TELEPHONE (OUTPATIENT)
Dept: SURGERY | Age: 76
End: 2021-04-28

## 2021-04-28 DIAGNOSIS — C50.512 MALIGNANT NEOPLASM OF LOWER-OUTER QUADRANT OF LEFT FEMALE BREAST, UNSPECIFIED ESTROGEN RECEPTOR STATUS (HCC): Primary | ICD-10-CM

## 2021-04-28 NOTE — TELEPHONE ENCOUNTER
Gave results. Please cancel follow up appt with me and schedule MRI and breast talk with Dr. Chantel Spear. Orders placed.

## 2021-05-10 ENCOUNTER — HOSPITAL ENCOUNTER (OUTPATIENT)
Dept: MRI IMAGING | Age: 76
Discharge: HOME | End: 2021-05-10
Payer: MEDICARE

## 2021-05-10 ENCOUNTER — TELEPHONE (OUTPATIENT)
Dept: SURGERY | Age: 76
End: 2021-05-10

## 2021-05-10 DIAGNOSIS — C50.512 MALIGNANT NEOPLASM OF LOWER-OUTER QUADRANT OF LEFT FEMALE BREAST, UNSPECIFIED ESTROGEN RECEPTOR STATUS (HCC): ICD-10-CM

## 2021-05-10 LAB
GFR AFRICAN AMERICAN: >60
GFR NON-AFRICAN AMERICAN: >60
PERFORMED ON: NORMAL
POC CREATININE: 0.7 MG/DL (ref 0.3–1.3)
POC SAMPLE TYPE: NORMAL

## 2021-05-10 PROCEDURE — 6360000004 HC RX CONTRAST MEDICATION: Performed by: PHYSICIAN ASSISTANT

## 2021-05-10 PROCEDURE — 77049 MRI BREAST C-+ W/CAD BI: CPT

## 2021-05-10 PROCEDURE — A9577 INJ MULTIHANCE: HCPCS | Performed by: PHYSICIAN ASSISTANT

## 2021-05-10 PROCEDURE — 82565 ASSAY OF CREATININE: CPT

## 2021-05-10 RX ADMIN — GADOBENATE DIMEGLUMINE 18 ML: 529 INJECTION, SOLUTION INTRAVENOUS at 14:50

## 2021-05-10 NOTE — TELEPHONE ENCOUNTER
Left message with patients  to have her return call    I was going to offer a sooner appointment for [address] and consult with Dr. Elie Orr for [email or web address]

## 2021-05-13 NOTE — PROGRESS NOTES
HISTORY OF PRESENT ILLNESS:    Ms. Renae Kolb  is recently status post ultrasound guided breast biopsy  on the left which revealed a 0.8 cm low grade invasive ductal carcinoma. ER is positive at 70%. WV is positive at 77%. Her2 is negative. Ki67 is low at 8%. Mammaprint is low risk luminal type A    MRI-5/10/2021  Left breast with a 2.4 x 3.5 cm irregular mass with irregular and   angular margins in the middle third, slightly lower outer breast, 5:00   position, 7 cm from the nipple on axial images. Adjacent biopsy   marker. No additional suspicious finding in the left breast.   Right breast with a 0.9 cm focus of enhancement in the middle third,   slightly upper outer breast, 11:00 position, 5.4 cm from the nipple on   sagittal images. No suspicious axillary or internal mammary adenopathy. The visualized portion of the chest and abdomen appear within normal   limits considering technique.       Impression   1. Right breast with a 0.9 cm focus of enhancement as described above. Recommend second look ultrasound. 2. Redemonstration of known left breast cancer, with a greater size of   enhancement than appreciated by ultrasound. BI-RADS CATEGORY 0: NEED ADDITIONAL EVALUATION AND/OR PRIOR MAMMOGRAMS   FOR COMPARISON. Management Recommendation: Recall for additional imaging. Signed by Dr Renee Quiros on 5/10/2021 3:23 PM        Second look ultrasound was performed today due to MRI finding. The   patient has a known cancer in the left breast.   TECHNIQUE: Multiple longitudinal and transverse sonographic images   were obtained and reviewed in real-time. Targeted ultrasound reveals a   small nodule at the 12:00 position in the right breast. It measures   0.90 x 0.60 x 0.50 cm. It is hypoechoic with smooth margins. There is   no posterior acoustic shadowing.  It is 4 cm from the nipple and 2 cm   deep.       Impression   Impression: Suspicious right breast nodule in patient with known left   breast cancer   Recommendations: Ultrasound-guided biopsy prior to definitive surgical   therapy. DISCUSSION:  I had a lengthy discussion with Ms. Alpesh Vick  and her family about the ramifications of the diagnosis of breast cancer. We discussed the pathophysiology of cancer in general and also the ways in which surgery, radiation therapy, and chemotherapy are utilized in the treatment of different types of cancers. We also explained how these modalities related to her situation in particular. We discussed the pathophysiology of breast cancer and some length, including what is known about the causes of breast cancer, its relationship to fibrocystic disease, its relationship to hormone replacement therapy, and some of the genetic aspects involved in familial breast cancers. We discussed the BrCa genetic analysis and why it is appropriate for her. We discussed breast MRI and how it assists in evaluation of breast cancers and the results of her MRI if done. We discussed the surgical options including simple mastectomy and lumpectomy with  sentinel lymph node biopsy as well as the possibility of axillary lymph node dissection. We explained in depth why breast conservation therapy requires radiation treatments for the majority of women. These treatments may be external beam for 6 weeks, partial breast for 5 days,  or intraoperative. I explained that most women treated for invasive malignancy do receive systemic therapy, hormonal therapy or  chemotherapy postoperatively depending upon the final pathology, the lymph node status, and the hormone receptor status. I discussed Oncotype Dx, Mammoprint, and Adjuvant Online as tools which aid in the decision for chemotherapy or hormonal therapy. We also discussed the possibility of breast reconstruction if a mastectomy was required.   .  I explained to her the different techniques including placement of a subpectoral implant with a Alloderm sling  versus TRAM flap

## 2021-05-17 ENCOUNTER — INITIAL CONSULT (OUTPATIENT)
Dept: SURGERY | Age: 76
End: 2021-05-17
Payer: MEDICARE

## 2021-05-17 ENCOUNTER — PROCEDURE VISIT (OUTPATIENT)
Dept: SURGERY | Age: 76
End: 2021-05-17
Payer: MEDICARE

## 2021-05-17 DIAGNOSIS — C50.512 MALIGNANT NEOPLASM OF LOWER-OUTER QUADRANT OF LEFT FEMALE BREAST, UNSPECIFIED ESTROGEN RECEPTOR STATUS (HCC): ICD-10-CM

## 2021-05-17 DIAGNOSIS — N63.0 LUMP OR MASS IN BREAST: ICD-10-CM

## 2021-05-17 DIAGNOSIS — C50.512 MALIGNANT NEOPLASM OF LOWER-OUTER QUADRANT OF LEFT BREAST OF FEMALE, ESTROGEN RECEPTOR POSITIVE (HCC): Primary | ICD-10-CM

## 2021-05-17 DIAGNOSIS — R92.8 ABNORMAL MRI, BREAST: ICD-10-CM

## 2021-05-17 DIAGNOSIS — Z17.0 MALIGNANT NEOPLASM OF LOWER-OUTER QUADRANT OF LEFT BREAST OF FEMALE, ESTROGEN RECEPTOR POSITIVE (HCC): Primary | ICD-10-CM

## 2021-05-17 PROCEDURE — 76642 ULTRASOUND BREAST LIMITED: CPT | Performed by: SURGERY

## 2021-05-17 PROCEDURE — 99417 PROLNG OP E/M EACH 15 MIN: CPT | Performed by: SURGERY

## 2021-05-17 PROCEDURE — 99215 OFFICE O/P EST HI 40 MIN: CPT | Performed by: SURGERY

## 2021-05-17 NOTE — Clinical Note
Lumpectomy and sentinel node biopsy whenever Preop ultrasound and intraoperative ultrasound-guided needle localization   BioSorb, pec block, margin probe, flaps, etc. Day before to office for marking, PT, etc.

## 2021-05-18 RX ORDER — MONTELUKAST SODIUM 10 MG/1
10 TABLET ORAL DAILY
Qty: 30 TABLET | Refills: 5 | Status: SHIPPED | OUTPATIENT
Start: 2021-05-18 | End: 2021-11-15 | Stop reason: SDUPTHER

## 2021-05-18 RX ORDER — TAMOXIFEN CITRATE 20 MG/1
20 TABLET ORAL DAILY
Qty: 30 TABLET | Refills: 5 | Status: SHIPPED | OUTPATIENT
Start: 2021-05-18 | End: 2021-07-02 | Stop reason: ALTCHOICE

## 2021-05-21 DIAGNOSIS — C50.512 MALIGNANT NEOPLASM OF LOWER-OUTER QUADRANT OF LEFT FEMALE BREAST, UNSPECIFIED ESTROGEN RECEPTOR STATUS (HCC): Primary | ICD-10-CM

## 2021-05-24 ENCOUNTER — APPOINTMENT (OUTPATIENT)
Dept: WOMENS IMAGING | Age: 76
End: 2021-05-24
Payer: MEDICARE

## 2021-05-25 ENCOUNTER — TELEPHONE (OUTPATIENT)
Dept: SURGERY | Age: 76
End: 2021-05-25

## 2021-05-26 DIAGNOSIS — R93.89 ABNORMAL FINDING ON IMAGING: Primary | ICD-10-CM

## 2021-05-26 DIAGNOSIS — R92.8 OTHER ABNORMAL AND INCONCLUSIVE FINDINGS ON DIAGNOSTIC IMAGING OF BREAST: ICD-10-CM

## 2021-06-01 ENCOUNTER — HOSPITAL ENCOUNTER (OUTPATIENT)
Dept: PREADMISSION TESTING | Age: 76
Discharge: HOME | End: 2021-06-05
Payer: MEDICARE

## 2021-06-01 VITALS — BODY MASS INDEX: 38.48 KG/M2 | WEIGHT: 196 LBS | HEIGHT: 60 IN

## 2021-06-01 LAB
ANION GAP SERPL CALCULATED.3IONS-SCNC: 10 MMOL/L (ref 7–19)
BASOPHILS ABSOLUTE: 0 K/UL (ref 0–0.2)
BASOPHILS RELATIVE PERCENT: 0.3 % (ref 0–1)
BUN BLDV-MCNC: 17 MG/DL (ref 8–23)
CALCIUM SERPL-MCNC: 9.7 MG/DL (ref 8.8–10.2)
CHLORIDE BLD-SCNC: 100 MMOL/L (ref 98–111)
CO2: 28 MMOL/L (ref 22–29)
CREAT SERPL-MCNC: 0.6 MG/DL (ref 0.5–0.9)
EKG P AXIS: 9 DEGREES
EKG P-R INTERVAL: 248 MS
EKG Q-T INTERVAL: 414 MS
EKG QRS DURATION: 104 MS
EKG QTC CALCULATION (BAZETT): 422 MS
EKG T AXIS: 2 DEGREES
EOSINOPHILS ABSOLUTE: 0 K/UL (ref 0–0.6)
EOSINOPHILS RELATIVE PERCENT: 0.6 % (ref 0–5)
GFR AFRICAN AMERICAN: >59
GFR NON-AFRICAN AMERICAN: >60
GLUCOSE BLD-MCNC: 84 MG/DL (ref 74–109)
HCT VFR BLD CALC: 42.7 % (ref 37–47)
HEMOGLOBIN: 14.6 G/DL (ref 12–16)
IMMATURE GRANULOCYTES #: 0 K/UL
LYMPHOCYTES ABSOLUTE: 2 K/UL (ref 1.1–4.5)
LYMPHOCYTES RELATIVE PERCENT: 28.6 % (ref 20–40)
MCH RBC QN AUTO: 30.7 PG (ref 27–31)
MCHC RBC AUTO-ENTMCNC: 34.2 G/DL (ref 33–37)
MCV RBC AUTO: 89.7 FL (ref 81–99)
MONOCYTES ABSOLUTE: 0.4 K/UL (ref 0–0.9)
MONOCYTES RELATIVE PERCENT: 5.9 % (ref 0–10)
NEUTROPHILS ABSOLUTE: 4.4 K/UL (ref 1.5–7.5)
NEUTROPHILS RELATIVE PERCENT: 64.3 % (ref 50–65)
PDW BLD-RTO: 12.4 % (ref 11.5–14.5)
PLATELET # BLD: 161 K/UL (ref 130–400)
PMV BLD AUTO: 11 FL (ref 9.4–12.3)
POTASSIUM SERPL-SCNC: 4.4 MMOL/L (ref 3.5–5)
RBC # BLD: 4.76 M/UL (ref 4.2–5.4)
SODIUM BLD-SCNC: 138 MMOL/L (ref 136–145)
WBC # BLD: 6.9 K/UL (ref 4.8–10.8)

## 2021-06-01 PROCEDURE — 80048 BASIC METABOLIC PNL TOTAL CA: CPT

## 2021-06-01 PROCEDURE — 93005 ELECTROCARDIOGRAM TRACING: CPT | Performed by: SURGERY

## 2021-06-01 PROCEDURE — 93010 ELECTROCARDIOGRAM REPORT: CPT | Performed by: INTERNAL MEDICINE

## 2021-06-01 PROCEDURE — 85025 COMPLETE CBC W/AUTO DIFF WBC: CPT

## 2021-06-01 RX ORDER — CELECOXIB 200 MG/1
200 CAPSULE ORAL ONCE
Status: CANCELLED | OUTPATIENT
Start: 2021-06-04

## 2021-06-01 RX ORDER — ACETAMINOPHEN 325 MG/1
975 TABLET ORAL ONCE
Status: CANCELLED | OUTPATIENT
Start: 2021-06-04

## 2021-06-01 RX ORDER — GABAPENTIN 300 MG/1
300 CAPSULE ORAL ONCE
Status: CANCELLED | OUTPATIENT
Start: 2021-06-04

## 2021-06-02 ENCOUNTER — PROCEDURE VISIT (OUTPATIENT)
Dept: SURGERY | Age: 76
End: 2021-06-02
Payer: MEDICARE

## 2021-06-02 ENCOUNTER — HOSPITAL ENCOUNTER (OUTPATIENT)
Dept: WOMENS IMAGING | Age: 76
Discharge: HOME | End: 2021-06-02
Payer: MEDICARE

## 2021-06-02 DIAGNOSIS — R93.89 ABNORMAL FINDING ON IMAGING: ICD-10-CM

## 2021-06-02 DIAGNOSIS — N63.0 LUMP OR MASS IN BREAST: Primary | ICD-10-CM

## 2021-06-02 DIAGNOSIS — R92.8 OTHER ABNORMAL AND INCONCLUSIVE FINDINGS ON DIAGNOSTIC IMAGING OF BREAST: ICD-10-CM

## 2021-06-02 DIAGNOSIS — C50.912 MALIGNANT NEOPLASM OF LEFT FEMALE BREAST, UNSPECIFIED ESTROGEN RECEPTOR STATUS, UNSPECIFIED SITE OF BREAST (HCC): ICD-10-CM

## 2021-06-02 PROCEDURE — 88305 TISSUE EXAM BY PATHOLOGIST: CPT

## 2021-06-02 PROCEDURE — 88341 IMHCHEM/IMCYTCHM EA ADD ANTB: CPT

## 2021-06-02 PROCEDURE — 77065 DX MAMMO INCL CAD UNI: CPT

## 2021-06-02 PROCEDURE — 19083 BX BREAST 1ST LESION US IMAG: CPT | Performed by: SURGERY

## 2021-06-02 PROCEDURE — 88342 IMHCHEM/IMCYTCHM 1ST ANTB: CPT

## 2021-06-02 PROCEDURE — 76642 ULTRASOUND BREAST LIMITED: CPT | Performed by: SURGERY

## 2021-06-04 ENCOUNTER — ANESTHESIA EVENT (OUTPATIENT)
Dept: OPERATING ROOM | Age: 76
End: 2021-06-04
Payer: MEDICARE

## 2021-06-04 ENCOUNTER — HOSPITAL ENCOUNTER (OUTPATIENT)
Age: 76
Setting detail: OUTPATIENT SURGERY
Discharge: HOME | End: 2021-06-04
Attending: SURGERY | Admitting: SURGERY
Payer: MEDICARE

## 2021-06-04 ENCOUNTER — ANESTHESIA (OUTPATIENT)
Dept: OPERATING ROOM | Age: 76
End: 2021-06-04
Payer: MEDICARE

## 2021-06-04 ENCOUNTER — APPOINTMENT (OUTPATIENT)
Dept: NUCLEAR MEDICINE | Age: 76
End: 2021-06-04
Payer: MEDICARE

## 2021-06-04 ENCOUNTER — APPOINTMENT (OUTPATIENT)
Dept: ULTRASOUND IMAGING | Age: 76
End: 2021-06-04
Attending: SURGERY
Payer: MEDICARE

## 2021-06-04 VITALS
DIASTOLIC BLOOD PRESSURE: 89 MMHG | SYSTOLIC BLOOD PRESSURE: 166 MMHG | BODY MASS INDEX: 38.48 KG/M2 | TEMPERATURE: 97.3 F | HEIGHT: 60 IN | HEART RATE: 57 BPM | OXYGEN SATURATION: 94 % | RESPIRATION RATE: 16 BRPM | WEIGHT: 196 LBS

## 2021-06-04 VITALS — OXYGEN SATURATION: 96 % | SYSTOLIC BLOOD PRESSURE: 142 MMHG | TEMPERATURE: 97.3 F | DIASTOLIC BLOOD PRESSURE: 72 MMHG

## 2021-06-04 DIAGNOSIS — C80.1 MALIGNANT (PRIMARY) NEOPLASM, UNSPECIFIED (HCC): Primary | Chronic | ICD-10-CM

## 2021-06-04 PROBLEM — N60.99 ATYPICAL DUCTAL HYPERPLASIA OF BREAST: Status: ACTIVE | Noted: 2021-06-04

## 2021-06-04 PROCEDURE — 7100000011 HC PHASE II RECOVERY - ADDTL 15 MIN: Performed by: SURGERY

## 2021-06-04 PROCEDURE — 88329 PATH CONSLTJ DRG SURG: CPT

## 2021-06-04 PROCEDURE — 38525 BIOPSY/REMOVAL LYMPH NODES: CPT | Performed by: SURGERY

## 2021-06-04 PROCEDURE — 2580000003 HC RX 258: Performed by: ANESTHESIOLOGY

## 2021-06-04 PROCEDURE — 88307 TISSUE EXAM BY PATHOLOGIST: CPT

## 2021-06-04 PROCEDURE — 3430000000 HC RX DIAGNOSTIC RADIOPHARMACEUTICAL: Performed by: SURGERY

## 2021-06-04 PROCEDURE — 6370000000 HC RX 637 (ALT 250 FOR IP): Performed by: SURGERY

## 2021-06-04 PROCEDURE — 6360000002 HC RX W HCPCS: Performed by: ANESTHESIOLOGY

## 2021-06-04 PROCEDURE — 2709999900 HC NON-CHARGEABLE SUPPLY: Performed by: SURGERY

## 2021-06-04 PROCEDURE — 76998 US GUIDE INTRAOP: CPT | Performed by: SURGERY

## 2021-06-04 PROCEDURE — 19285 PERQ DEV BREAST 1ST US IMAG: CPT | Performed by: SURGERY

## 2021-06-04 PROCEDURE — 7100000001 HC PACU RECOVERY - ADDTL 15 MIN: Performed by: SURGERY

## 2021-06-04 PROCEDURE — 88305 TISSUE EXAM BY PATHOLOGIST: CPT

## 2021-06-04 PROCEDURE — 6360000002 HC RX W HCPCS: Performed by: SURGERY

## 2021-06-04 PROCEDURE — 14301 TIS TRNFR ANY 30.1-60 SQ CM: CPT | Performed by: SURGERY

## 2021-06-04 PROCEDURE — 3600000015 HC SURGERY LEVEL 5 ADDTL 15MIN: Performed by: SURGERY

## 2021-06-04 PROCEDURE — 38900 IO MAP OF SENT LYMPH NODE: CPT | Performed by: SURGERY

## 2021-06-04 PROCEDURE — 19294 PREPJ TUM CAV IORT PRTL MAST: CPT | Performed by: SURGERY

## 2021-06-04 PROCEDURE — 38792 RA TRACER ID OF SENTINL NODE: CPT

## 2021-06-04 PROCEDURE — 6360000002 HC RX W HCPCS

## 2021-06-04 PROCEDURE — 19286 PERQ DEV BREAST ADD US IMAG: CPT | Performed by: SURGERY

## 2021-06-04 PROCEDURE — 88361 TUMOR IMMUNOHISTOCHEM/COMPUT: CPT

## 2021-06-04 PROCEDURE — 14302 TIS TRNFR ADDL 30 SQ CM: CPT | Performed by: SURGERY

## 2021-06-04 PROCEDURE — 19301 PARTIAL MASTECTOMY: CPT | Performed by: SURGERY

## 2021-06-04 PROCEDURE — 7100000000 HC PACU RECOVERY - FIRST 15 MIN: Performed by: SURGERY

## 2021-06-04 PROCEDURE — 2500000003 HC RX 250 WO HCPCS: Performed by: ANESTHESIOLOGY

## 2021-06-04 PROCEDURE — 19285 PERQ DEV BREAST 1ST US IMAG: CPT

## 2021-06-04 PROCEDURE — 3700000001 HC ADD 15 MINUTES (ANESTHESIA): Performed by: SURGERY

## 2021-06-04 PROCEDURE — 2580000003 HC RX 258: Performed by: SURGERY

## 2021-06-04 PROCEDURE — A4648 IMPLANTABLE TISSUE MARKER: HCPCS | Performed by: SURGERY

## 2021-06-04 PROCEDURE — 7100000010 HC PHASE II RECOVERY - FIRST 15 MIN: Performed by: SURGERY

## 2021-06-04 PROCEDURE — C1713 ANCHOR/SCREW BN/BN,TIS/BN: HCPCS | Performed by: SURGERY

## 2021-06-04 PROCEDURE — 3600000005 HC SURGERY LEVEL 5 BASE: Performed by: SURGERY

## 2021-06-04 PROCEDURE — A9520 TC99 TILMANOCEPT DIAG 0.5MCI: HCPCS | Performed by: SURGERY

## 2021-06-04 PROCEDURE — 2500000003 HC RX 250 WO HCPCS

## 2021-06-04 PROCEDURE — 3700000000 HC ANESTHESIA ATTENDED CARE: Performed by: SURGERY

## 2021-06-04 PROCEDURE — 19125 EXCISION BREAST LESION: CPT | Performed by: SURGERY

## 2021-06-04 DEVICE — THE MARKER IS A RADIOGRAPHIC IMPLANTABLE MARKER USED TO MARK SOFT TISSUE.IT IS COMPRISED OF A BIOABSORBABLE SPACER THAT HOLDS RADIOPAQUE MARKER CLIPS.
Type: IMPLANTABLE DEVICE | Site: BREAST | Status: FUNCTIONAL
Brand: BIOZORB MARKER

## 2021-06-04 RX ORDER — PROPOFOL 10 MG/ML
INJECTION, EMULSION INTRAVENOUS CONTINUOUS PRN
Status: DISCONTINUED | OUTPATIENT
Start: 2021-06-04 | End: 2021-06-04 | Stop reason: SDUPTHER

## 2021-06-04 RX ORDER — CELECOXIB 200 MG/1
200 CAPSULE ORAL ONCE
Status: COMPLETED | OUTPATIENT
Start: 2021-06-04 | End: 2021-06-04

## 2021-06-04 RX ORDER — DEXAMETHASONE SODIUM PHOSPHATE 4 MG/ML
4 INJECTION, SOLUTION INTRA-ARTICULAR; INTRALESIONAL; INTRAMUSCULAR; INTRAVENOUS; SOFT TISSUE ONCE
Status: COMPLETED | OUTPATIENT
Start: 2021-06-04 | End: 2021-06-04

## 2021-06-04 RX ORDER — HYDROMORPHONE HYDROCHLORIDE 1 MG/ML
0.25 INJECTION, SOLUTION INTRAMUSCULAR; INTRAVENOUS; SUBCUTANEOUS EVERY 5 MIN PRN
Status: DISCONTINUED | OUTPATIENT
Start: 2021-06-04 | End: 2021-06-04 | Stop reason: HOSPADM

## 2021-06-04 RX ORDER — PROPOFOL 10 MG/ML
INJECTION, EMULSION INTRAVENOUS PRN
Status: DISCONTINUED | OUTPATIENT
Start: 2021-06-04 | End: 2021-06-04 | Stop reason: SDUPTHER

## 2021-06-04 RX ORDER — HYDROMORPHONE HYDROCHLORIDE 1 MG/ML
0.5 INJECTION, SOLUTION INTRAMUSCULAR; INTRAVENOUS; SUBCUTANEOUS EVERY 5 MIN PRN
Status: DISCONTINUED | OUTPATIENT
Start: 2021-06-04 | End: 2021-06-04 | Stop reason: HOSPADM

## 2021-06-04 RX ORDER — ONDANSETRON 2 MG/ML
4 INJECTION INTRAMUSCULAR; INTRAVENOUS
Status: DISCONTINUED | OUTPATIENT
Start: 2021-06-04 | End: 2021-06-04 | Stop reason: HOSPADM

## 2021-06-04 RX ORDER — FENTANYL CITRATE 50 UG/ML
INJECTION, SOLUTION INTRAMUSCULAR; INTRAVENOUS PRN
Status: DISCONTINUED | OUTPATIENT
Start: 2021-06-04 | End: 2021-06-04 | Stop reason: SDUPTHER

## 2021-06-04 RX ORDER — ONDANSETRON 2 MG/ML
INJECTION INTRAMUSCULAR; INTRAVENOUS PRN
Status: DISCONTINUED | OUTPATIENT
Start: 2021-06-04 | End: 2021-06-04 | Stop reason: SDUPTHER

## 2021-06-04 RX ORDER — SODIUM CHLORIDE 0.9 % (FLUSH) 0.9 %
10 SYRINGE (ML) INJECTION PRN
Status: DISCONTINUED | OUTPATIENT
Start: 2021-06-04 | End: 2021-06-04 | Stop reason: HOSPADM

## 2021-06-04 RX ORDER — ACETAMINOPHEN 325 MG/1
975 TABLET ORAL ONCE
Status: COMPLETED | OUTPATIENT
Start: 2021-06-04 | End: 2021-06-04

## 2021-06-04 RX ORDER — SODIUM CHLORIDE 0.9 % (FLUSH) 0.9 %
10 SYRINGE (ML) INJECTION EVERY 12 HOURS SCHEDULED
Status: DISCONTINUED | OUTPATIENT
Start: 2021-06-04 | End: 2021-06-04 | Stop reason: HOSPADM

## 2021-06-04 RX ORDER — SODIUM CHLORIDE 9 MG/ML
25 INJECTION, SOLUTION INTRAVENOUS PRN
Status: DISCONTINUED | OUTPATIENT
Start: 2021-06-04 | End: 2021-06-04 | Stop reason: HOSPADM

## 2021-06-04 RX ORDER — ISOSULFAN BLUE 50 MG/5ML
INJECTION, SOLUTION SUBCUTANEOUS PRN
Status: DISCONTINUED | OUTPATIENT
Start: 2021-06-04 | End: 2021-06-04 | Stop reason: ALTCHOICE

## 2021-06-04 RX ORDER — SODIUM CHLORIDE, SODIUM LACTATE, POTASSIUM CHLORIDE, CALCIUM CHLORIDE 600; 310; 30; 20 MG/100ML; MG/100ML; MG/100ML; MG/100ML
INJECTION, SOLUTION INTRAVENOUS CONTINUOUS
Status: DISCONTINUED | OUTPATIENT
Start: 2021-06-04 | End: 2021-06-04 | Stop reason: HOSPADM

## 2021-06-04 RX ORDER — DEXAMETHASONE SODIUM PHOSPHATE 10 MG/ML
INJECTION, SOLUTION INTRAMUSCULAR; INTRAVENOUS PRN
Status: DISCONTINUED | OUTPATIENT
Start: 2021-06-04 | End: 2021-06-04 | Stop reason: SDUPTHER

## 2021-06-04 RX ORDER — HYDROCODONE BITARTRATE AND ACETAMINOPHEN 5; 325 MG/1; MG/1
1 TABLET ORAL EVERY 6 HOURS PRN
Qty: 12 TABLET | Refills: 0 | Status: SHIPPED | OUTPATIENT
Start: 2021-06-04 | End: 2022-06-04

## 2021-06-04 RX ORDER — FENTANYL CITRATE 50 UG/ML
50 INJECTION, SOLUTION INTRAMUSCULAR; INTRAVENOUS EVERY 5 MIN PRN
Status: DISCONTINUED | OUTPATIENT
Start: 2021-06-04 | End: 2021-06-04 | Stop reason: HOSPADM

## 2021-06-04 RX ORDER — LIDOCAINE HYDROCHLORIDE 10 MG/ML
INJECTION, SOLUTION EPIDURAL; INFILTRATION; INTRACAUDAL; PERINEURAL PRN
Status: DISCONTINUED | OUTPATIENT
Start: 2021-06-04 | End: 2021-06-04 | Stop reason: SDUPTHER

## 2021-06-04 RX ORDER — GABAPENTIN 300 MG/1
300 CAPSULE ORAL ONCE
Status: COMPLETED | OUTPATIENT
Start: 2021-06-04 | End: 2021-06-04

## 2021-06-04 RX ADMIN — FENTANYL CITRATE 100 MCG: 50 INJECTION, SOLUTION INTRAMUSCULAR; INTRAVENOUS at 09:00

## 2021-06-04 RX ADMIN — FAMOTIDINE 20 MG: 10 INJECTION, SOLUTION INTRAVENOUS at 08:07

## 2021-06-04 RX ADMIN — PROPOFOL 50 MG: 10 INJECTION, EMULSION INTRAVENOUS at 08:58

## 2021-06-04 RX ADMIN — TILMANOCEPT 0.5 MILLICURIE: KIT at 09:18

## 2021-06-04 RX ADMIN — DEXAMETHASONE SODIUM PHOSPHATE 4 MG: 4 INJECTION, SOLUTION INTRAMUSCULAR; INTRAVENOUS at 08:06

## 2021-06-04 RX ADMIN — HYDROMORPHONE HYDROCHLORIDE 0.25 MG: 1 INJECTION, SOLUTION INTRAMUSCULAR; INTRAVENOUS; SUBCUTANEOUS at 11:52

## 2021-06-04 RX ADMIN — PROPOFOL 100 MCG/KG/MIN: 10 INJECTION, EMULSION INTRAVENOUS at 09:00

## 2021-06-04 RX ADMIN — SODIUM CHLORIDE, POTASSIUM CHLORIDE, SODIUM LACTATE AND CALCIUM CHLORIDE: 600; 310; 30; 20 INJECTION, SOLUTION INTRAVENOUS at 08:00

## 2021-06-04 RX ADMIN — FAMOTIDINE 20 MG: 10 INJECTION, SOLUTION INTRAVENOUS at 09:05

## 2021-06-04 RX ADMIN — PROPOFOL 50 MG: 10 INJECTION, EMULSION INTRAVENOUS at 09:34

## 2021-06-04 RX ADMIN — ONDANSETRON HYDROCHLORIDE 4 MG: 2 INJECTION, SOLUTION INTRAMUSCULAR; INTRAVENOUS at 11:16

## 2021-06-04 RX ADMIN — DEXAMETHASONE SODIUM PHOSPHATE 10 MG: 10 INJECTION, SOLUTION INTRAMUSCULAR; INTRAVENOUS at 10:05

## 2021-06-04 RX ADMIN — CELECOXIB 200 MG: 200 CAPSULE ORAL at 08:00

## 2021-06-04 RX ADMIN — ACETAMINOPHEN 975 MG: 325 TABLET ORAL at 08:00

## 2021-06-04 RX ADMIN — GABAPENTIN 300 MG: 300 CAPSULE ORAL at 08:00

## 2021-06-04 RX ADMIN — LIDOCAINE HYDROCHLORIDE 50 MG: 10 INJECTION, SOLUTION EPIDURAL; INFILTRATION; INTRACAUDAL; PERINEURAL at 09:00

## 2021-06-04 RX ADMIN — PROPOFOL 50 MG: 10 INJECTION, EMULSION INTRAVENOUS at 09:03

## 2021-06-04 RX ADMIN — Medication 2000 MG: at 09:01

## 2021-06-04 ASSESSMENT — PAIN DESCRIPTION - LOCATION: LOCATION: BREAST

## 2021-06-04 ASSESSMENT — PAIN DESCRIPTION - PAIN TYPE: TYPE: SURGICAL PAIN

## 2021-06-04 ASSESSMENT — PAIN SCALES - GENERAL
PAINLEVEL_OUTOF10: 4
PAINLEVEL_OUTOF10: 3
PAINLEVEL_OUTOF10: 0

## 2021-06-04 ASSESSMENT — PAIN DESCRIPTION - ORIENTATION: ORIENTATION: RIGHT;LEFT

## 2021-06-04 ASSESSMENT — LIFESTYLE VARIABLES: SMOKING_STATUS: 0

## 2021-06-04 NOTE — H&P
HISTORY OF PRESENT ILLNESS:     Ms. Leonarda Pedersen  is recently status post ultrasound guided breast biopsy  on the left which revealed a 0.8 cm low grade invasive ductal carcinoma. ER is positive at 70%. MO is positive at 77%. Her2 is negative. Ki67 is low at 8%.      Mammaprint is low risk luminal type A     MRI-5/10/2021  Left breast with a 2.4 x 3.5 cm irregular mass with irregular and   angular margins in the middle third, slightly lower outer breast, 5:00   position, 7 cm from the nipple on axial images. Adjacent biopsy   marker. No additional suspicious finding in the left breast.   Right breast with a 0.9 cm focus of enhancement in the middle third,   slightly upper outer breast, 11:00 position, 5.4 cm from the nipple on   sagittal images. No suspicious axillary or internal mammary adenopathy. The visualized portion of the chest and abdomen appear within normal   limits considering technique.       Impression   1. Right breast with a 0.9 cm focus of enhancement as described above. Recommend second look ultrasound. 2. Redemonstration of known left breast cancer, with a greater size of   enhancement than appreciated by ultrasound. BI-RADS CATEGORY 0: NEED ADDITIONAL EVALUATION AND/OR PRIOR MAMMOGRAMS   FOR COMPARISON. Management Recommendation: Recall for additional imaging. Signed by Dr Daren Storm on 5/10/2021 3:23 PM          Second look ultrasound was performed today due to MRI finding. The   patient has a known cancer in the left breast.   TECHNIQUE: Multiple longitudinal and transverse sonographic images   were obtained and reviewed in real-time. Targeted ultrasound reveals a   small nodule at the 12:00 position in the right breast. It measures   0.90 x 0.60 x 0.50 cm. It is hypoechoic with smooth margins. There is   no posterior acoustic shadowing.  It is 4 cm from the nipple and 2 cm   deep.       Impression   Impression: Suspicious right breast nodule in patient with known left   breast cancer   Recommendations: Ultrasound-guided biopsy prior to definitive surgical   therapy.       Right breast nodule shows fibroadenoma with ADH. It will need excision.       Ria Mcfarland is a 77 y.o. female with the following history as recorded in [facility]:  Patient Active Problem List    Diagnosis Date Noted    Malignant neoplasm of lower-outer quadrant of left female breast ([facility] 04/28/2021    Malignant (primary) neoplasm, unspecified ([facility] 01/12/2021    Primary osteoarthritis involving multiple joints 01/12/2021    Class 2 severe obesity due to excess calories with serious comorbidity and body mass index (BMI) of 38.0 to 38.9 in [facility]) 11/18/2019    Acute drug-induced gout of foot 04/23/2018    Mixed hyperlipidemia 04/17/2017    History of hysterectomy for cancer 04/17/2017    Essential hypertension 03/31/2017    Postmenopausal bleeding 04/10/2013    Rectal polyp 09/24/2012     Current Facility-Administered Medications   Medication Dose Route Frequency Provider Last Rate Last Admin    lactated ringers infusion   Intravenous Continuous Josafat Melendez MD        HYDROmorphone HCl PF (DILAUDID) injection 0.25 mg  0.25 mg Intravenous Q5 Min PRN Josafat Melendez MD        HYDROmorphone HCl PF (DILAUDID) injection 0.5 mg  0.5 mg Intravenous Q5 Min PRN Josafat Melendez MD        fentaNYL (SUBLIMAZE) injection 50 mcg  50 mcg Intravenous Q5 Min PRN Josafat Melendez MD        ondansetron [facility]) injection 4 mg  4 mg Intravenous Once PRN Josafat Melendez MD        lactated ringers infusion   Intravenous Continuous Josafat Melendez MD 75 mL/hr at 06/04/21 0800 New Bag at 06/04/21 0800    sodium chloride flush 0.9 % injection 10 mL  10 mL Intravenous 2 times per day Josafat Melendez MD        sodium chloride flush 0.9 % injection 10 mL  10 mL Intravenous PRN Josafat Melendez MD        0.9 % sodium chloride infusion  25 mL Intravenous PRN Josafat Melendez MD        technetium Tc 99m tilmanocept (LYMPHOSEEK) injection 0.5 millicurie  035 micro curie Intradermal ONCE PRN Filiberto Shepherd MD         Allergies: Patient has no known allergies. Past Medical History:   Diagnosis Date    Cancer [facility])     breast    Hyperlipidemia     Hypertension     Osteoarthritis      Past Surgical History:   Procedure Laterality Date    ANUS SURGERY      polyp removed    BREAST BIOPSY      Right-benign    COLONOSCOPY      HYSTERECTOMY      KNEE SURGERY      bilateral replacement    RECTAL SURGERY  8/24/2012    Rigid proctoscopy with transanal excision of rectal polyp    US BREAST NEEDLE BIOPSY RIGHT Right 6/2/2021    US BREAST NEEDLE BIOPSY RIGHT 6/2/2021 [facility]     Family History   Problem Relation Age of Onset    Heart Disease Mother     High Blood Pressure Mother     Heart Disease Father     High Blood Pressure Father      Social History     Tobacco Use    Smoking status: Never Smoker    Smokeless tobacco: Never Used   Substance Use Topics    Alcohol use: No       ROS:  14 point review of systems is negative except for the above. PHYSICAL EXAM:    The patient is a 77 y.o. female  in no acute distress. She is alert oriented and cooperative. Mood and affect are appropriate. Skin is warm and dry without rashes. BP (!) 151/87   Pulse 73   Temp 98.2 °F (36.8 °C) (Temporal)   Resp 16   Ht 5' (1.524 m)   Wt 196 lb (88.9 kg)   SpO2 97%   BMI 38.28 kg/m²       HEENT: Normocephalic and atraumatic. EOMs intact. Pupils equal and round and reactive to light and accommodation. External ears and nose are normal.  Sclera nonicteric. Conjunctiva normal  Oropharynx without masses or lesions. Neck: Neck is supple without masses or thyromegaly    Chest: Lungs are clear to auscultation. Respiratory effort normal    Cardiac: Regular rate and rhythm without rubs, murmurs, or gallops    Breasts: The breasts are symmetrical. There are fibrocystic changes throughout both breasts.  There are no dominant masses, no skin or nipple changes, and no axillary adenopathy. Abdomen: The abdomen is soft and nontender with no hepatosplenomegaly. There are no abdominal hernias noted. Extremities: The extremities are normal. There are no signs of clubbing, cyanosis, or edema. IMPRESSION:left breast cancer with right ADH     DISCUSSION:  I had a lengthy discussion with Ms. Mark Granger  and her family about the ramifications of the diagnosis of breast cancer. We discussed the pathophysiology of cancer in general and also the ways in which surgery, radiation therapy, and chemotherapy are utilized in the treatment of different types of cancers. We also explained how these modalities related to her situation in particular. We discussed the pathophysiology of breast cancer and some length, including what is known about the causes of breast cancer, its relationship to fibrocystic disease, its relationship to hormone replacement therapy, and some of the genetic aspects involved in familial breast cancers. We discussed the BrCa genetic analysis and why it is appropriate for her. We discussed breast MRI and how it assists in evaluation of breast cancers and the results of her MRI if done.       We discussed the surgical options including simple mastectomy and lumpectomy with  sentinel lymph node biopsy as well as the possibility of axillary lymph node dissection. We explained in depth why breast conservation therapy requires radiation treatments for the majority of women. These treatments may be external beam for 6 weeks, partial breast for 5 days,  or intraoperative. I explained that most women treated for invasive malignancy do receive systemic therapy, hormonal therapy or  chemotherapy postoperatively depending upon the final pathology, the lymph node status, and the hormone receptor status.   I discussed Oncotype Dx, Mammoprint, and Adjuvant Online as tools which aid in the decision for chemotherapy or hormonal therapy. We also discussed the possibility of breast reconstruction if a mastectomy was required. .  I explained to her the different techniques including placement of a subpectoral implant with a Alloderm sling  versus TRAM flap reconstruction as welll as other methods of reconstruction. She does not wish to pursue reconstruction at this time.         After a prolonged discussion lasting 100 minutes  we felt it was most appropriate that she undergo left lumpectomy, and sentinel node biopsy, with preop ultrasound and and intraop ultrasound guided needle localization     She will need ultrasound biopsy of the right breast nodule prior to her definitive surgery. This will need excision       We discussed the risks and benefits of the surgery including but not limited to bleeding, infection, pain, lymphedema, numbness, and also the risks of general anesthesia including pneumonia, blood clots, heart attack, stroke, and death.   She expresses good understanding and is agreeable to proceed with surgery.       We will schedule this to be done when convenient  at [facility].

## 2021-06-04 NOTE — ANESTHESIA PRE PROCEDURE
Department of Anesthesiology  Preprocedure Note       Name:  Ketan Valenzuela   Age:  77 y.o.  :  1945                                          MRN:  [id number]         Date:  2021      Surgeon: Lizeth Falk):  Shirley Orantes MD    Procedure: Procedure(s):  LEFT LUMPECTOMY AND SNB, PREOP US AND INTRAOP US GUIDED NEEDLE LOC, BIOSORB, FLAPS, MARGIN PRBE, BILATERAL PEC BLOCK  EXCISION RIGHT BREAST LESION WITH NEEDLE LOCALIZATION    Medications prior to admission:   Prior to Admission medications    Medication Sig Start Date End Date Taking? Authorizing Provider   tamoxifen (NOLVADEX) 20 MG tablet Take 1 tablet by mouth daily 21   Shirley Orantes MD   montelukast (SINGULAIR) 10 MG tablet Take 1 tablet by mouth daily 21   Shirley Orantes MD   mupirocin Dilcia Baptiste) 2 % ointment Apply to each nostril BID 5 days prior to surgery 21   Shirley Orantes MD   Calcium Carbonate-Vit D-Min (CALCIUM 1200) 9494-5928 MG-UNIT CHEW Take 1 tablet by mouth daily     Historical Provider, MD   metoprolol tartrate (LOPRESSOR) 25 MG tablet Take 1 tablet by mouth twice daily 21   Jose Luis Duncan MD   lisinopril (PRINIVIL;ZESTRIL) 40 MG tablet Take 1 tablet by mouth once daily 20   Jose Luis Duncan MD   lovastatin (MEVACOR) 40 MG tablet Take 1 tablet by mouth nightly 20   Jose Luis Duncan MD   allopurinol (ZYLOPRIM) 100 MG tablet Take 2 tablets by mouth daily 20   Jose Luis Duncan MD   Compression Stockings MISC by Does not apply route Low to medium compression stocking just below knee 10/29/18   Jose Luis Duncan MD   multivitamin [facility]) per tablet Take 1 tablet by mouth daily. Historical Provider, MD   aspirin 81 MG EC tablet Take 81 mg by mouth daily.       Historical Provider, MD   fish oil-omega-3 fatty acids 1000 MG capsule Take 1 g by mouth daily     Historical Provider, MD       Current medications:    Current Facility-Administered Medications   Medication Dose Route Frequency Provider Last Rate Last Admin    acetaminophen (TYLENOL) tablet 975 mg  975 mg Oral Once Eusebia Baxter MD        celecoxib (CELEBREX) capsule 200 mg  200 mg Oral Once Eusebia Baxter MD        gabapentin (NEURONTIN) capsule 300 mg  300 mg Oral Once Eusebia Baxter MD        lactated ringers infusion   Intravenous Continuous Aidan Menendez MD           Allergies:  No Known Allergies    Problem List:    Patient Active Problem List   Diagnosis Code    Rectal polyp K62.1    Postmenopausal bleeding N95.0    Essential hypertension I10    Mixed hyperlipidemia E78.2    History of hysterectomy for cancer Z90.710    Acute drug-induced gout of foot M10.279    Class 2 severe obesity due to excess calories with serious comorbidity and body mass index (BMI) of 38.0 to 38.9 in adult ([facility] [address] E66.01, Z68.38    Malignant (primary) neoplasm, unspecified (HCC) C80.1    Primary osteoarthritis involving multiple joints M89.49    Malignant neoplasm of lower-outer quadrant of left female breast ([facility] [address] C50.512       Past Medical History:        Diagnosis Date    [facility]     bre[facility]    Hyperlipidemia     Hypertension     Osteoarthritis        Past Surgical History:        Procedure Laterality Date    ANUS SURGERY      polyp removed    BREAST BIOPSY      Right-benign    COLONOSCOPY      HYSTERECTOMY      KNEE SURGERY      bilateral replacement    RECTAL SURGERY  8/24/2012    Rigid proctoscopy with transanal excision of rectal polyp    US BREAST NEEDLE BIOPSY RIGHT Right 6/2/2021    US BREAST NEEDLE BIOPSY RIGHT 6/2/2021 [facility]       Social History:    Social History     Tobacco Use    Smoking status: Never Smoker    Smokeless tobacco: Never Used   Substance Use Topics    Alcohol use:  No                                Counseling given: Not Answered      Vital Signs (Current):   Vitals:    06/04/21 0733   BP: (!) 151/87   Pulse: 73   Resp: 16   Temp: 98.2 °F (36.8 °C)   TempSrc: Temporal   SpO2: 97%   Weight: 196 lb (88.9 kg)   Height: 5' (1.524 m)                                              BP Readings from Last 3 Encounters:   06/04/21 (!) 151/87   04/20/21 (!) 163/108   01/12/21 134/80       NPO Status:                                                                                 BMI:   Wt Readings from Last 3 Encounters:   06/04/21 196 lb (88.9 kg)   06/01/21 196 lb (88.9 kg)   04/20/21 201 lb (91.2 kg)     Body mass index is 38.28 kg/m². CBC:   Lab Results   Component Value Date    WBC 6.9 06/01/2021    RBC 4.76 06/01/2021    HGB 14.6 06/01/2021    HCT 42.7 06/01/2021    MCV 89.7 06/01/2021    RDW 12.4 06/01/2021     06/01/2021       CMP:   Lab Results   Component Value Date     06/01/2021    K 4.4 06/01/2021     06/01/2021    CO2 28 06/01/2021    BUN 17 06/01/2021    CREATININE 0.6 06/01/2021    GFRAA >59 06/01/2021    LABGLOM >60 06/01/2021    GLUCOSE 84 06/01/2021    PROT 7.3 07/08/2020    CALCIUM 9.7 06/01/2021    BILITOT 0.6 07/08/2020    ALKPHOS 93 07/08/2020    AST 28 07/08/2020    ALT 28 07/08/2020       POC Tests: No results for input(s): POCGLU, POCNA, POCK, POCCL, POCBUN, POCHEMO, POCHCT in the last 72 hours.     Coags: No results found for: PROTIME, INR, APTT    HCG (If Applicable): No results found for: PREGTESTUR, PREGSERUM, HCG, HCGQUANT     ABGs: No results found for: PHART, PO2ART, DDX3KVE, GCQ0BOA, BEART, Q4JHMTDI     Type & Screen (If Applicable):  No results found for: LABABO, LABRH    Drug/Infectious Status (If Applicable):  No results found for: HIV, HEPCAB    COVID-19 Screening (If Applicable): No results found for: COVID19        Anesthesia Evaluation  Patient summary reviewed no history of anesthetic complications:   Airway: Mallampati: II  TM distance: >3 FB   Neck ROM: full  Mouth opening: > = 3 FB Dental:          Pulmonary:normal exam  breath sounds clear to auscultation      (-) asthma, recent URI, sleep apnea and not a current smoker Cardiovascular:  Exercise tolerance: good (>4 METS),   (+) hypertension:,     (-) pacemaker, past MI, CABG/stent and  angina    ECG reviewed  Rhythm: regular  Rate: normal           Beta Blocker:  Dose within 24 Hrs         Neuro/Psych:      (-) seizures, TIA and CVA           GI/Hepatic/Renal:   (+) morbid obesity     (-) GERD, liver disease and no renal disease       Endo/Other:    (+) malignancy/cancer (Breast). (-) diabetes mellitus, hypothyroidism, hyperthyroidism               Abdominal:   (+) obese,         Vascular:                                      Anesthesia Plan      MAC     ASA 3     (Preop dexamethasone, famotidine)  Induction: intravenous. MIPS: Postoperative opioids intended and Prophylactic antiemetics administered. Anesthetic plan and risks discussed with patient. Use of blood products discussed with patient whom consented to blood products. Plan discussed with CRNA.                 Beth Casarez MD   6/4/2021

## 2021-06-04 NOTE — ANESTHESIA POSTPROCEDURE EVALUATION
Department of Anesthesiology  Postprocedure Note    Patient: Emelyn Dominguez  MRN: [id number]  YOB: 1945  Date of evaluation: 6/4/2021  Time:  11:31 AM     Procedure Summary     Date: 06/04/21 Room / Location: [address]    Anesthesia Start: 7755 Anesthesia Stop: 1074    Procedures:       LEFT LUMPECTOMY AND SNB, PREOP US AND INTRAOP US GUIDED NEEDLE LOC, BIOSORB, FLAPS, MARGIN PRBE, BILATERAL PEC BLOCK (Left )      EXCISION RIGHT BREAST LESION WITH NEEDLE LOCALIZATION (Right Breast) Diagnosis: (X54.211)    Surgeons: Mateo Krueger MD Responsible Provider: BETZY Devries CRNA    Anesthesia Type: MAC ASA Status: 3          Anesthesia Type: MAC    Jose Phase I: Jose Score: 9    Jose Phase II:      Last vitals: Reviewed and per EMR flowsheets.        Anesthesia Post Evaluation    Patient location during evaluation: PACU  Patient participation: complete - patient participated  Level of consciousness: awake and alert  Pain score: 0  Airway patency: patent  Nausea & Vomiting: no nausea and no vomiting  Complications: no  Cardiovascular status: hemodynamically stable  Respiratory status: nonlabored ventilation, spontaneous ventilation, room air and acceptable  Hydration status: euvolemic

## 2021-06-05 NOTE — OP NOTE
[facility] [employer] [facility] [address], [address] [facility]                                OPERATIVE REPORT    PATIENT NAME: Mary Sanchez                   :        1945  MED REC NO:   [id number]                              ROOM:  ACCOUNT NO:   [de-identified]                           ADMIT DATE: 2021  PROVIDER:     Sari Arana MD    DATE OF PROCEDURE:  2021    PREOPERATIVE DIAGNOSIS:  Atypical ductal hyperplasia, right breast and  invasive ductal carcinoma, left breast.    POSTOPERATIVE DIAGNOSIS:  Atypical ductal hyperplasia, right breast and  invasive ductal carcinoma, left breast.    PROCEDURES:  1. Injection of radionuclide, left breast.  2.  Lymphatic mapping, left breast.  3.  Ultrasound-guided needle localization, bilateral breasts. 4.  Placement of needle x2.  5.  Bilateral pectoral blocks. 6.  Needle localization and excision of right breast lesion. 7.  Left partial mastectomy. 8.  Left sentinel lymph node biopsy. 9.  Utilization of MarginProbe intraoperative margin assessment device. 10.  Placement of 3-dimensional implant to facilitate radiation  targeting, tissue filling and future tumor site following. 11. Soft tissue transfer flaps greater than 30 cm2, the primary defect  measured 8 x 12 cm for 96 cm2, the secondary defect measured 18 x 15 cm  for a total of 270 cm2, for a total of 366 cm2. SURGEON:  Sari Arana MD    ASSISTANT:  Irina Oliva PA-C. He was present for all portions of  the case including all critical portions as well as closure. ANESTHESIA:  MAC with pectoral block. INDICATIONS:  The patient is a 50-year-old lady who initially presented  with a mass in the left breast.  Ultrasound-guided biopsy demonstrated  invasive ductal carcinoma. It measured about 3.5 cm in size. Breast  MRI showed no additional lesions on this side but did show an area of  enhancement on the right.   Two days ago, we biopsied this area, it  looked like a fibroadenoma; however, it did have ADH associated with it. We discussed this with the family, and they understand and are agreeable  to address this at the same time. OPERATIVE PROCEDURE:  Today, she was brought to the operating room,  where she was sedated, and then the left side was injected with 500  microcuries of Lymphoseek in the left periareolar dermis. We then  utilized ultrasound to identify the clip on both sides. We began on the  right and inserted a 5 cm Kopans wire directly into the previously  placed clip from 2 days ago. This was done without difficulty. We  turned our attention to the left side and again identified the lesion at  approximately the 6 o'clock position on the breast, inserted a 5 cm  Kopans wire into that lesion as well. Once this was done, we prepped  the entire chest with chlorhexidine and then performed bilateral  pectoral blocks. We used a solution of 100 mL of 0.2% ropivacaine, 16  mg of Decadron, and 100 mL of saline. We injected the intercostal  perforators on each side of the sternum under ultrasound guidance. We  injected the inframammary crease bilaterally and the infraclavicular  area bilaterally. We then went laterally on the left and injected the  interpectoral space as well as the lateral intercostal perforators again  with ultrasound guidance, then also repeated this on the right. Then,  re-prepped and re-draped and began on the right side. We made a  curvilinear incision over the clip and dissected down and excised a  wedge of breast tissue containing the clip. There was no gross tumor in  any of this. We irrigated copiously, obtained good hemostasis and  closed with 3-0 Vicryl for the subcutaneous and 4-0 Monocryl for the  skin. We did visualize the clip. We then turned our attention to the  left and because this was on the inferior, approximately 6 o'clock pole,  we elected to do an inframammary inverted T-type incision. We made a  long incision along the inframammary crease and then dissected down and  elevated the entire bottom half of the breast off the chest wall. This  was a relatively huge incision. Once we freed all this up, we used  ultrasound to demonstrate exactly where our tumor was and we then marked  the area for potential excision. We then tailor-tacked those edges  together to make sure we could get adequate closure after this excision  and it was adequate. We then made our triangular incision, took out  that bottom wedge of breast tissue and before we had completed it, we  marked cranial, caudal, medial, and lateral margins with the margin map. We then completed our excision and placed a deep marker as well. We  then did specimen ultrasound, which showed all of our margins looked  pretty good. The closest was about 5 mm on the medial side. We then  utilized the MarginProbe intraoperative margin assessment device. Cranial and caudal were excellent, lateral showed no positivity, medial  did show some positivity as did some in the deep side. We did re-excise  both of these margins. We then irrigated copiously and made sure we had  good hemostasis. We then used the Neoprobe to identify a hot area of  lymph node tissue in the axilla. We carefully dissected down to it and  excised a portion of axillary tissue containing the node. It was hot at  about 1700 counts. It was not grossly involved with tumor. We then  irrigated copiously, clipped a couple of small bleeders and then closed  the right with 2-0 and 3-0 Vicryl and Monocryl and then the left with  2-0 and 3-0 Vicryl and 4-0 Stratafix. Prineo dressings were applied. On the left, there was a single large round Nelson-Stevens drain. Estimated blood loss, minimal.  Complications, none. She tolerated the  procedure well.         Shea Spring MD    D: 06/04/2021 14:07:10      T: 06/04/2021 15:42:55     [email or web address]  Job#: [id number]     Doc#: [id number] CC:

## 2021-06-08 NOTE — PROGRESS NOTES
HISTORY OF PRESENT ILLNESS:    Ms. Gonzalez Cancer  is recently status post ultrasound guided breast biopsy  on the left which revealed a 0.8 cm low grade invasive ductal carcinoma. ER is positive at 70%. FL is positive at 77%. Her2 is negative. Ki67 is low at 8%. Mammaprint is low risk luminal type A    MRI-5/10/2021  Left breast with a 2.4 x 3.5 cm irregular mass with irregular and   angular margins in the middle third, slightly lower outer breast, 5:00   position, 7 cm from the nipple on axial images. Adjacent biopsy   marker. No additional suspicious finding in the left breast.   Right breast with a 0.9 cm focus of enhancement in the middle third,   slightly upper outer breast, 11:00 position, 5.4 cm from the nipple on   sagittal images. No suspicious axillary or internal mammary adenopathy. The visualized portion of the chest and abdomen appear within normal   limits considering technique.       Impression   1. Right breast with a 0.9 cm focus of enhancement as described above. Recommend second look ultrasound. 2. Redemonstration of known left breast cancer, with a greater size of   enhancement than appreciated by ultrasound. BI-RADS CATEGORY 0: NEED ADDITIONAL EVALUATION AND/OR PRIOR MAMMOGRAMS   FOR COMPARISON. Management Recommendation: Recall for additional imaging. Signed by Dr Wander Ferrara on 5/10/2021 3:23 PM     She is now status post left partial mastectomy with left sentinel lymph node biopsy and right excisional biopsy on 6/4/2021. PATHOLOGY REVEALS:    A. Breast, right breast excisional biopsy:   1.  Fibroadenoma with focal involvement by atypical ductal hyperplasia. 2.  Microfocus of atypical ductal hyperplasia identified separately from   the fibroadenoma, margins negative. Cassandra Branham, left lumpectomy:   1.  Invasive ductal carcinoma, no special type, grade 1.   2.  Invasive carcinoma measures 1.5 cm in greatest dimension   microscopically.    3.  Invasive carcinoma is located 0.4 cm from the nearest medial surgical   excision margin. 4.  Focal low-grade ductal carcinoma in situ is identified in conjunction   with the invasive lesion. 5.  In situ carcinoma is located greater than 1.0 cm from all evaluated   margins. 6.  Prior biopsy site identified. 7.  Sections of skin, negative for evidence of malignancy. C.  Breast, excision of additional left breast medial margin:   1.  Focal atypical ductal hyperplasia, margins negative. 2.  Negative for evidence of residual in situ or invasive carcinoma. D.  Breast, excision of additional left breast deep margin: Benign   adipose tissue and skeletal muscle. E.  Lymph nodes, left sentinel lymph node biopsies: 8 lymph nodes,   negative for evidence of malignancy. AJCC STAGE: pT1c, pN0, pMx     PHYSICAL EXAM:  The  wounds look good with no evidence of infection, fluid accumulation, or skin necrosis. JOYCE drain was not removed due to excess drainage    IMPRESSION:    Doing well s/p left partial mastectomy with left sentinel lymph node biopsy and right excisional biopsy    PLAN: See Jian Meza in one week to remove drain if ready. Refer to Dr. Myra Amin to see in 3 weeks. I will see her back in the office in one month. She will call with any new concerns. I have seen, examined and reviewed this patient medication list, appropriate labs and imaging studies. I reviewed relevant medical records and others physicians notes. I discussed the plans of care with the patient. I answered all the questions to the patients satisfaction. I, Dr Joselyn Hopkins, personally performed the services described in this documentation as scribed by Caroline Zamorano MA in my presence and is both accurate and complete.      (Please note that portions of this note were completed with a voice recognition program. Efforts were made to edit the dictations but occasionally words are mis-transcribed.)  Over 50% of the total visit time of 15 minutes in face to face encounter with the patient, out of which more than 50% of the time was spent in counseling patient or family and coordination of care. Counseling included but was not limited to time spent reviewing labs, imaging studies/ treatment plan and answering questions.

## 2021-06-10 ENCOUNTER — OFFICE VISIT (OUTPATIENT)
Dept: SURGERY | Age: 76
End: 2021-06-10

## 2021-06-10 VITALS — SYSTOLIC BLOOD PRESSURE: 124 MMHG | DIASTOLIC BLOOD PRESSURE: 80 MMHG | HEART RATE: 80 BPM

## 2021-06-10 DIAGNOSIS — C50.912 MALIGNANT NEOPLASM OF LEFT FEMALE BREAST, UNSPECIFIED ESTROGEN RECEPTOR STATUS, UNSPECIFIED SITE OF BREAST (HCC): Primary | ICD-10-CM

## 2021-06-10 DIAGNOSIS — C50.512 MALIGNANT NEOPLASM OF LOWER-OUTER QUADRANT OF LEFT BREAST OF FEMALE, ESTROGEN RECEPTOR POSITIVE (HCC): ICD-10-CM

## 2021-06-10 DIAGNOSIS — Z98.890 STATUS POST LEFT BREAST LUMPECTOMY: ICD-10-CM

## 2021-06-10 DIAGNOSIS — Z17.0 MALIGNANT NEOPLASM OF LOWER-OUTER QUADRANT OF LEFT BREAST OF FEMALE, ESTROGEN RECEPTOR POSITIVE (HCC): ICD-10-CM

## 2021-06-10 PROCEDURE — 99024 POSTOP FOLLOW-UP VISIT: CPT | Performed by: SURGERY

## 2021-06-17 ENCOUNTER — OFFICE VISIT (OUTPATIENT)
Dept: SURGERY | Age: 76
End: 2021-06-17

## 2021-06-17 VITALS
SYSTOLIC BLOOD PRESSURE: 120 MMHG | DIASTOLIC BLOOD PRESSURE: 78 MMHG | HEIGHT: 60 IN | WEIGHT: 193 LBS | BODY MASS INDEX: 37.89 KG/M2 | TEMPERATURE: 98.4 F

## 2021-06-17 DIAGNOSIS — C50.912 MALIGNANT NEOPLASM OF LEFT FEMALE BREAST, UNSPECIFIED ESTROGEN RECEPTOR STATUS, UNSPECIFIED SITE OF BREAST (HCC): Primary | ICD-10-CM

## 2021-06-17 PROCEDURE — 99024 POSTOP FOLLOW-UP VISIT: CPT | Performed by: PHYSICIAN ASSISTANT

## 2021-06-18 NOTE — PROGRESS NOTES
Car Fish comes today for her follow-up from left partial mastectomy and left sentinel lymph node biopsy. She also had a right breast biopsy. She comes today with decreased JOYCE output out of the left partial mastectomy site. She is doing well. Patient Active Problem List    Diagnosis Date Noted    Status post left breast lumpectomy with SNB 6/4/2021 06/10/2021    Atypical ductal hyperplasia of breast 06/04/2021    Malignant neoplasm of lower-outer quadrant of left female breast ([facility] [address] 04/28/2021    Malignant (primary) neoplasm, unspecified ([facility] [address] 01/12/2021    Primary osteoarthritis involving multiple joints 01/12/2021    Class 2 severe obesity due to excess calories with serious comorbidity and body mass index (BMI) of 38.0 to 38.9 in [facility]) 11/18/2019    Acute drug-induced gout of foot 04/23/2018    Mixed hyperlipidemia 04/17/2017    History of hysterectomy for cancer 04/17/2017    Essential hypertension 03/31/2017    Postmenopausal bleeding 04/10/2013    Rectal polyp 09/24/2012       Current Outpatient Medications   Medication Sig Dispense Refill    HYDROcodone-acetaminophen (NORCO) 5-325 MG per tablet Take 1 tablet by mouth every 6 hours as needed for Pain.  (Patient not taking: Reported on 6/10/2021) 12 tablet 0    tamoxifen (NOLVADEX) 20 MG tablet Take 1 tablet by mouth daily 30 tablet 5    montelukast (SINGULAIR) 10 MG tablet Take 1 tablet by mouth daily 30 tablet 5    mupirocin (BACTROBAN) 2 % ointment Apply to each nostril BID 5 days prior to surgery 1 Tube 0    Calcium Carbonate-Vit D-Min (CALCIUM 1200) 7689-2755 MG-UNIT CHEW Take 1 tablet by mouth daily       metoprolol tartrate (LOPRESSOR) 25 MG tablet Take 1 tablet by mouth twice daily (Patient taking differently: Take 25 mg by mouth 2 times daily Take 1   25mg tablet by mouth twice daily) 180 tablet 3    lisinopril (PRINIVIL;ZESTRIL) 40 MG tablet Take 1 tablet by mouth once daily (Patient taking differently: Take 40 mg by mouth daily ) 90 tablet 0    lovastatin (MEVACOR) 40 MG tablet Take 1 tablet by mouth nightly 90 tablet 0    allopurinol (ZYLOPRIM) 100 MG tablet Take 2 tablets by mouth daily 180 tablet 3    Compression Stockings MISC by Does not apply route Low to medium compression stocking just below knee 1 each 0    multivitamin (THERAGRAN) per tablet Take 1 tablet by mouth daily.  aspirin 81 MG EC tablet Take 81 mg by mouth daily.  fish oil-omega-3 fatty acids 1000 MG capsule Take 1 g by mouth daily        No current facility-administered medications for this visit. Allergies: Patient has no known allergies.     Past Medical History:   Diagnosis Date    Cancer [facility])     breast    Hyperlipidemia     Hypertension     Osteoarthritis        Past Surgical History:   Procedure Laterality Date    ANUS SURGERY      polyp removed    BREAST BIOPSY      Right-benign    BREAST LUMPECTOMY Left 6/4/2021    LEFT LUMPECTOMY AND SNB, PREOP US AND INTRAOP US GUIDED NEEDLE LOC, BIOSORB, FLAPS, MARGIN PRBE, BILATERAL PEC BLOCK performed by Daniel Aguero MD at [address] [place] [address] Right 6/4/2021    EXCISION RIGHT BREAST LESION WITH NEEDLE LOCALIZATION performed by Daniel Aguero MD at [place] [address]      bilateral replacement    RECTAL SURGERY  8/24/2012    Rigid proctoscopy with transanal excision of rectal polyp    US BREAST NEEDLE BIOPSY RIGHT Right 6/2/2021    US BREAST NEEDLE BIOPSY RIGHT 6/2/2021 [facility]       Family History   Problem Relation Age of Onset    Heart Disease Mother     High Blood Pressure Mother     Heart Disease Father     High Blood Pressure Father        Social History     Tobacco Use    Smoking status: Never Smoker    Smokeless tobacco: Never Used   Substance Use Topics    Alcohol use: No          ROS:  review of system reviewed and positive for the above all other systems noted to be negative      Physical Exam Blood pressure 120/78, temperature 98.4 °F (36.9 °C), temperature source Temporal, height 5' (1.524 m), weight 193 lb (87.5 kg). Constitutional:  This is a 77 y. o.female that appears to be in no acute distress. She is pleasant and answers questions appropriately. Breast:  On examination to her breast, the left breast incision is healing well. The right breast incision is healing well. The JOYCE drainage in the left lumpectomy site shows some serosanguineous drainage. Impression  Doing well status post left breast lumpectomy and right breast biopsy    Plan  We will see her back in the office next week.

## 2021-06-23 ENCOUNTER — OFFICE VISIT (OUTPATIENT)
Dept: SURGERY | Age: 76
End: 2021-06-23

## 2021-06-23 VITALS
DIASTOLIC BLOOD PRESSURE: 88 MMHG | WEIGHT: 193 LBS | SYSTOLIC BLOOD PRESSURE: 136 MMHG | BODY MASS INDEX: 37.89 KG/M2 | HEART RATE: 75 BPM | HEIGHT: 60 IN | TEMPERATURE: 98.4 F

## 2021-06-23 DIAGNOSIS — C50.912 MALIGNANT NEOPLASM OF LEFT FEMALE BREAST, UNSPECIFIED ESTROGEN RECEPTOR STATUS, UNSPECIFIED SITE OF BREAST (HCC): Primary | ICD-10-CM

## 2021-06-23 PROCEDURE — 99024 POSTOP FOLLOW-UP VISIT: CPT | Performed by: PHYSICIAN ASSISTANT

## 2021-06-23 NOTE — PROGRESS NOTES
 allopurinol (ZYLOPRIM) 100 MG tablet Take 2 tablets by mouth daily 180 tablet 3    Compression Stockings MISC by Does not apply route Low to medium compression stocking just below knee 1 each 0    multivitamin (THERAGRAN) per tablet Take 1 tablet by mouth daily.  aspirin 81 MG EC tablet Take 81 mg by mouth daily.  fish oil-omega-3 fatty acids 1000 MG capsule Take 1 g by mouth daily        No current facility-administered medications for this visit. Allergies: Patient has no known allergies.     Past Medical History:   Diagnosis Date    Cancer [facility])     breast    Hyperlipidemia     Hypertension     Osteoarthritis        Past Surgical History:   Procedure Laterality Date    ANUS SURGERY      polyp removed    BREAST BIOPSY      Right-benign    BREAST LUMPECTOMY Left 6/4/2021    LEFT LUMPECTOMY AND SNB, PREOP US AND INTRAOP US GUIDED NEEDLE LOC, BIOSORB, FLAPS, MARGIN PRBE, BILATERAL PEC BLOCK performed by Joana Cortés MD at [address] [place] [address] Right 6/4/2021    EXCISION RIGHT BREAST LESION WITH NEEDLE LOCALIZATION performed by Joana Cortés MD at [place] [address]      bilateral replacement    RECTAL SURGERY  8/24/2012    Rigid proctoscopy with transanal excision of rectal polyp    US BREAST NEEDLE BIOPSY RIGHT Right 6/2/2021    US BREAST NEEDLE BIOPSY RIGHT 6/2/2021 [facility]       Family History   Problem Relation Age of Onset    Heart Disease Mother     High Blood Pressure Mother     Heart Disease Father     High Blood Pressure Father        Social History     Tobacco Use    Smoking status: Never Smoker    Smokeless tobacco: Never Used   Substance Use Topics    Alcohol use: No          ROS:  review of system reviewed and positive for the above all other systems noted to be negative      Physical Exam  Blood pressure 136/88, pulse 75, temperature 98.4 °F (36.9 °C), temperature source Temporal, height 5' (1.524 m), weight 193 lb (87.5 kg). Constitutional:  This is a 77 y. o.female that appears to be in no acute distress. She is pleasant and answers questions appropriately. Breast:  On examination to her breast, the left breast incision is healing well. The right breast incision is healing well. Impression   status post left breast lumpectomy and right breast biopsy    Plan  We will see her back in the office with Dr. Latanya Wayne on July 15, 2021. She was see her medical oncologist as scheduled.

## 2021-06-29 NOTE — PROGRESS NOTES
MEDICAL ONCOLOGY CONSULTATION    Pt Name: Dustin Robert  MRN: [id number]  YOB: 1945  Date of evaluation: 7/2/2021    REASON FOR CONSULTATION:  Breast cancer  REQUESTING PHYSICIAN: Dr Julia Moreno    History Obtained From:  patient and old medical records    HISTORY OF PRESENT ILLNESS:      Diagnosis  · Infiltrating ductal carcinoma, left breast April 2021  · Favor grade 1  · ER 70%, SD 77%, Her-2 ramesh-negative, Ki67 8%-low  · MammaPrint Luminal type A, low risk    Treatment Summary  · 05/18/2021 Tamoxifen 20 mg daily  · 06/04/2021 Left breast lumpectomy with negative SLNB, 0/8 positive LN   · 07/02/2021 Switch to Letrozole 2.5mg daily    Cancer History  Vicki Amaya was first seen by me on 7/3/2021. She was referred by Dr. Gianfranco Nguyen for history of left breast IDC. This was in a screening detected lesion. · 4/15/21 Bilateral screening mammogram Luis E Guillen): A stellate 1.2cm mass identified centrally and inferiorly in the left breast. It is a new finding. A left diagnostic mammogram was performed consisting of spot compression views. This confirms the presence of a stellate mass in the left breast. A targeted, high resolution left breast ultrasound demonstrates intense acoustic shadowing t the 6 o'clock position in the left breast from the 1.2 mass. Mammographic appearance of the right breast is stable. BI-RADS category 4-suspicious for malignancy. Further evaluation is needed. · 4/15/21 US left breast including axilla Almgeoffrey Guillen): There is a focal area of acoustic shadowing identified at the 6 o'clock position in the left breast corresponding to the stellate mass seen on mammogram. There is so much shadowing it is difficult to tell what is mass versus acoustic shadowing. My best estimate of diameter is 1.2cm. Color Doppler demonstrates little to no blood flow to the mass. This is highly suspicious appearing. BI-RADS category 4, suspicious. Percutaneous biopsy is recommended.    · 4/26/21- Breast, left breast needle core biopsies at 5 o'clock position: Infiltrating ductal carcinoma, no special type, favor grade 1. Infiltrating carcinoma measures 0.8 cm in greatest linear dimension and is present in multiple cores. ER 70%, KY 77%, Her-2 ramesh-negative, Ki67 8%-low. Cytology: Lymph node, left axillary lymph node fine-needle aspiration, smears and ThinPrep: Polytypic small round lymphocytes, negative for evidence of metastasis. · 5/10/21 MRI bilateral breast: Right breast with a 0.9 cm focus of enhancement as described above. Recommend second look ultrasound. Redemonstration of known left breast cancer, with a greater size of enhancement than appreciated by ultrasound. · BI-RADS CATEGORY 0: NEED ADDITIONAL EVALUATION AND/OR PRIOR MAMMOGRAMS FOR COMPARISON. Management Recommendation: Recall for additional imaging. · 5/17/21 US right breast: Suspicious right breast nodule in patient with known left breast cancer Recommendations: Ultrasound-guided biopsy prior to definitive surgical therapy. · 6/2/21 US left breast: Targeted ultrasound demonstrates the previously described cancer at the 5:00 position in the left breast. It measures 1.5 x 1.0 x 0.60 cm. It is hypoechoic with irregular margins and some posterior acoustic shadowing. It is 5 cm from the nipple and 1.5 cm deep. It is unchanged from our last exam and the skin was marked appropriately. Recommendations: Surgical management when appropriate  · 6/2/21 Breast, right breast needle core biopsies at 12 o'clock position: Fibroadenoma with focal involvement by atypical ductal hyperplasia. COMMENT:    Intradepartmental consultation was obtained with Dr. Sherlyn Michael who concurs with the above diagnostic impression. · 6/4/21 Breast, right breast excisional biopsy: Fibroadenoma with focal involvement by atypical ductal hyperplasia. Microfocus of atypical ductal hyperplasia identified separately from the fibroadenoma, margins negative.   · 6/4/2021she underwent a breast, left lumpectomy: Invasive ductal carcinoma, no special type, grade 1. Invasive carcinoma measures 1.5 cm in greatest dimension microscopically. Invasive carcinoma is located 0.4 cm from the nearest medial surgical excision margin. Focal low-grade ductal carcinoma in situ is identified in conjunction with the invasive lesion. In situ carcinoma is located greater than 1.0 cm from all evaluated margins. Prior biopsy site identified. Sections of skin, negative for evidence of malignancy. Breast, excision of additional left breast medial margin: Focal atypical ductal hyperplasia, margins negative. Negative for evidence of residual in situ or invasive carcinoma. Breast, excision of additional left breast deep margin: Benign adipose tissue and skeletal muscle. Lymph nodes, left sentinel lymph node biopsies: 8 lymph nodes, negative for evidence of malignancy. AJCC STAGE: pT1c, pN0, pMx   · 7/3/2021she was first seen by me. I reviewed medical records. Essentially, stage I, low-grade disease. Tumor measured 1.5 cm. Oncotype DX recommended. Refer to radiation oncology. We will switch her to a letrozole.   Bone mineral density recommended      Past Medical History:    Past Medical History:   Diagnosis Date    Cancer ([address]     breast    Hyperlipidemia     Hypertension     Osteoarthritis        Past Surgical History:    Past Surgical History:   Procedure Laterality Date    ANUS SURGERY      polyp removed    BREAST BIOPSY      Right-benign    BREAST LUMPECTOMY Left 6/4/2021    LEFT LUMPECTOMY AND SNB, PREOP US AND INTRAOP US GUIDED NEEDLE LOC, BIOSORB, FLAPS, MARGIN PRBE, BILATERAL PEC BLOCK performed by Sonia Rosario MD at [address] [place] [address] Right 6/4/2021    EXCISION RIGHT BREAST LESION WITH NEEDLE LOCALIZATION performed by Sonia Rosario MD at [place] [address]      bilateral replacement    RECTAL SURGERY  8/24/2012    Rigid proctoscopy with transanal medications for this visit. Allergies: No Known Allergies      Subjective   REVIEW OF SYSTEMS:   CONSTITUTIONAL: no fever, no night sweats, no fatigue;  HEENT: no blurring of vision, no double vision, no hearing difficulty, no tinnitus, no ulceration, no dysplasia, no epistaxis;  LUNGS: no cough, no hemoptysis, no wheeze,  no shortness of breath;  CARDIOVASCULAR: no palpitation, no chest pain, no shortness of breath;  GI: no abdominal pain, no nausea, no vomiting, no diarrhea, no constipation;  FABRICIO: no dysuria, no hematuria, no frequency or urgency, no nephrolithiasis;  MUSCULOSKELETAL: no joint pain, occasional LE swelling, no stiffness;  ENDOCRINE: no polyuria, no polydipsia, no cold or heat intolerance;  HEMATOLOGY: no easy bruising or bleeding, no history of clotting disorder;  DERMATOLOGY: no skin rash, no eczema, no pruritus;  PSYCHIATRY: no depression, no anxiety, no panic attacks, no suicidal ideation, no homicidal ideation;  NEUROLOGY: no syncope, no seizures, no numbness or tingling of hands, no numbness or tingling of feet, no paresis;    Objective   BP (!) 110/58   Pulse 76   Ht 5' (1.524 m)   Wt 193 lb 11.2 oz (87.9 kg)   SpO2 97%   BMI 37.83 kg/m²     PHYSICAL EXAM:  CONSTITUTIONAL: Alert, appropriate, no acute distress  EYES: Non icteric, EOM intact, pupils equal round   ENT: Mucus membranes moist, no oral pharyngeal lesions, external inspection of ears and nose are normal  NECK: Supple, no masses. No palpable thyroid mass  CHEST/LUNGS: CTA bilaterally, normal respiratory effort   CARDIOVASCULAR: RRR, no murmurs. No lower extremity edema  ABDOMEN: soft non-tender, active bowel sounds, no HSM. No palpable masses  EXTREMITIES: warm, full ROM in all 4 extremities, no focal weakness. SKIN: warm, dry with no rashes or lesions  LYMPH: No cervical, clavicular, axillary, or inguinal lymphadenopathy  NEUROLOGIC: follows commands, non focal   PSYCH: mood and affect appropriate.   Alert and oriented to time, place, person      LABORATORY RESULTS REVIEWED/ANALYZED BY ME:  Lab Results   Component Value Date    WBC 6.12 07/02/2021    HGB 13.1 07/02/2021    HCT 40.7 07/02/2021    MCV 96.2 (H) 07/02/2021     (L) 07/02/2021     Lab Results   Component Value Date    NEUTROABS 3.55 07/02/2021       RADIOLOGY STUDIES REVIEWED BY ME:  As above  ASSESSMENT:    No orders of the defined types were placed in this encounter. Tania Toth was seen today for new patient. Diagnoses and all orders for this visit:    Infiltrating ductal carcinoma of breast, left [facility])    Care plan discussed with patient    Er+ IN+ carcinoma of breast, left ([facility]    Encounter for monitoring aromatase inhibitor therapy    Post-menopausal      IDC left breast, Grade 1 ER 70%, IN 77%, HER-2 negative, pT1 cN0 M0, MammaPrint low risk luminal type a  The patient was counseled today about diagnosis, staging, prognosis, diagnostic tests, medications, side effects and disease management. Essentially, stage I breast cancer   Recommended Oncotype DX regarding adjuvant chemotherapy. Referral to radiation oncology. Switch from tamoxifen to Femara. RTC 4 weeks to discuss results of Oncotype DX. At risk lymphedema-referred to lymphedema clinic. History of osteopenia- Repeat BMD.  Last BMD 5 years ago. PLAN:  · Continue follow-up with Dr. Valentin Route  · Refer to Dr. Kenneth Jones for radiation therapy evaluation  · Recommend Oncotype DX testing on pathology  · Recommend switching to Letrozole 2.5mg daily-script sent  · Refer to Lymphedema clinic  · Bone density before next visit  · RTC with MD 4 weeks    Wayne YANEZ, am scribing for Hernesto Tineo MD. Electronically signed by Wayne Vaughn RN on 7/2/2021 at 5:23 PM CDT. I, Dr Jennifer Monique, personally performed the services described in this documentation as scribed by Wayne Vaughn RN in my presence and is both accurate and complete.     I have seen, examined and reviewed this patient medication list, appropriate labs and imaging studies. I reviewed relevant medical records and others physicians notes. I discussed the plans of care with the patient. I answered all the questions to the patients satisfaction. I have also reviewed the chief complaint (CC) and part of the history (History of Present Illness (HPI), Past Family Social History [facility]), or Review of Systems (ROS) and made changes when appropriated. (Please note that portions of this note were completed with a voice recognition program. Efforts were made to edit the dictations but occasionally words are mis-transcribed.)  The total time (50 min) I spent to see the patient today includes at least one or more of the following: preparing to see the patient by reviewing prior tests, prior notes or other relevant information, performing appropriate independent examination and evaluation, counseling, ordering of medications, tests or procedures, referrals, communicating with other healthcare professionals when appropriated to coordinate care, documenting clinic information in the electronic medical record or other health records, independently interpreting results of tests, managing test results and communicating the results to the patient/family or caregiver.     Flavia العلي MD    07/02/21  1:12 PM

## 2021-06-30 DIAGNOSIS — C80.1 MALIGNANT (PRIMARY) NEOPLASM, UNSPECIFIED (HCC): Primary | ICD-10-CM

## 2021-07-02 ENCOUNTER — OFFICE VISIT (OUTPATIENT)
Dept: HEMATOLOGY | Age: 76
End: 2021-07-02
Payer: MEDICARE

## 2021-07-02 ENCOUNTER — HOSPITAL ENCOUNTER (OUTPATIENT)
Dept: INFUSION THERAPY | Age: 76
Discharge: HOME | End: 2021-07-02
Payer: MEDICARE

## 2021-07-02 VITALS
SYSTOLIC BLOOD PRESSURE: 110 MMHG | HEIGHT: 60 IN | WEIGHT: 193.7 LBS | HEART RATE: 76 BPM | BODY MASS INDEX: 38.03 KG/M2 | DIASTOLIC BLOOD PRESSURE: 58 MMHG | OXYGEN SATURATION: 97 %

## 2021-07-02 DIAGNOSIS — Z51.81 ENCOUNTER FOR MONITORING AROMATASE INHIBITOR THERAPY: ICD-10-CM

## 2021-07-02 DIAGNOSIS — Z79.811 ENCOUNTER FOR MONITORING AROMATASE INHIBITOR THERAPY: ICD-10-CM

## 2021-07-02 DIAGNOSIS — Z71.89 CARE PLAN DISCUSSED WITH PATIENT: ICD-10-CM

## 2021-07-02 DIAGNOSIS — C50.912 INFILTRATING DUCTAL CARCINOMA OF BREAST, LEFT (HCC): Primary | ICD-10-CM

## 2021-07-02 DIAGNOSIS — C80.1 MALIGNANT (PRIMARY) NEOPLASM, UNSPECIFIED (HCC): ICD-10-CM

## 2021-07-02 DIAGNOSIS — Z98.890 HISTORY OF LYMPH NODE DISSECTION OF LEFT AXILLA: ICD-10-CM

## 2021-07-02 DIAGNOSIS — Z78.0 POST-MENOPAUSAL: ICD-10-CM

## 2021-07-02 DIAGNOSIS — Z17.0 ER+ PR+ CARCINOMA OF BREAST, LEFT (HCC): ICD-10-CM

## 2021-07-02 DIAGNOSIS — C50.912 ER+ PR+ CARCINOMA OF BREAST, LEFT (HCC): ICD-10-CM

## 2021-07-02 LAB
BASOPHILS ABSOLUTE: 0.01 K/UL (ref 0.01–0.08)
BASOPHILS RELATIVE PERCENT: 0.2 % (ref 0.1–1.2)
EOSINOPHILS ABSOLUTE: 0.23 K/UL (ref 0.04–0.54)
EOSINOPHILS RELATIVE PERCENT: 3.8 % (ref 0.7–7)
HCT VFR BLD CALC: 40.7 % (ref 34.1–44.9)
HEMOGLOBIN: 13.1 G/DL (ref 11.2–15.7)
LYMPHOCYTES ABSOLUTE: 1.89 K/UL (ref 1.18–3.74)
LYMPHOCYTES RELATIVE PERCENT: 30.9 % (ref 19.3–53.1)
MCH RBC QN AUTO: 31 PG (ref 25.6–32.2)
MCHC RBC AUTO-ENTMCNC: 32.2 G/DL (ref 32.3–35.5)
MCV RBC AUTO: 96.2 FL (ref 79.4–94.8)
MONOCYTES ABSOLUTE: 0.44 K/UL (ref 0.24–0.82)
MONOCYTES RELATIVE PERCENT: 7.2 % (ref 4.7–12.5)
NEUTROPHILS ABSOLUTE: 3.55 K/UL (ref 1.56–6.13)
NEUTROPHILS RELATIVE PERCENT: 57.9 % (ref 34–71.1)
PDW BLD-RTO: 12.9 % (ref 11.7–14.4)
PLATELET # BLD: 167 K/UL (ref 182–369)
PMV BLD AUTO: 10.8 FL (ref 7.4–10.4)
RBC # BLD: 4.23 M/UL (ref 3.93–5.22)
WBC # BLD: 6.12 K/UL (ref 3.98–10.04)

## 2021-07-02 PROCEDURE — 99213 OFFICE O/P EST LOW 20 MIN: CPT

## 2021-07-02 PROCEDURE — 1090F PRES/ABSN URINE INCON ASSESS: CPT | Performed by: INTERNAL MEDICINE

## 2021-07-02 PROCEDURE — G8427 DOCREV CUR MEDS BY ELIG CLIN: HCPCS | Performed by: INTERNAL MEDICINE

## 2021-07-02 PROCEDURE — G8400 PT W/DXA NO RESULTS DOC: HCPCS | Performed by: INTERNAL MEDICINE

## 2021-07-02 PROCEDURE — 99204 OFFICE O/P NEW MOD 45 MIN: CPT | Performed by: INTERNAL MEDICINE

## 2021-07-02 PROCEDURE — 1123F ACP DISCUSS/DSCN MKR DOCD: CPT | Performed by: INTERNAL MEDICINE

## 2021-07-02 PROCEDURE — G8417 CALC BMI ABV UP PARAM F/U: HCPCS | Performed by: INTERNAL MEDICINE

## 2021-07-02 PROCEDURE — 3017F COLORECTAL CA SCREEN DOC REV: CPT | Performed by: INTERNAL MEDICINE

## 2021-07-02 PROCEDURE — 85025 COMPLETE CBC W/AUTO DIFF WBC: CPT

## 2021-07-02 PROCEDURE — 4040F PNEUMOC VAC/ADMIN/RCVD: CPT | Performed by: INTERNAL MEDICINE

## 2021-07-02 PROCEDURE — 1036F TOBACCO NON-USER: CPT | Performed by: INTERNAL MEDICINE

## 2021-07-02 RX ORDER — LETROZOLE 2.5 MG/1
2.5 TABLET, FILM COATED ORAL DAILY
Qty: 30 TABLET | Refills: 3 | Status: SHIPPED | OUTPATIENT
Start: 2021-07-02 | End: 2021-10-28

## 2021-07-12 ENCOUNTER — TELEPHONE (OUTPATIENT)
Dept: RADIATION ONCOLOGY | Facility: HOSPITAL | Age: 76
End: 2021-07-12

## 2021-07-14 PROBLEM — C50.912 MALIGNANT NEOPLASM OF LEFT BREAST IN FEMALE, ESTROGEN RECEPTOR POSITIVE (HCC): Status: ACTIVE | Noted: 2021-07-14

## 2021-07-14 PROBLEM — Z98.890 S/P LYMPH NODE BIOPSY: Status: ACTIVE | Noted: 2021-07-14

## 2021-07-14 PROBLEM — Z78.9 NON-SMOKER: Status: ACTIVE | Noted: 2021-07-14

## 2021-07-14 PROBLEM — Z17.0 MALIGNANT NEOPLASM OF LEFT BREAST IN FEMALE, ESTROGEN RECEPTOR POSITIVE (HCC): Status: ACTIVE | Noted: 2021-07-14

## 2021-07-14 PROBLEM — Z98.890 S/P LUMPECTOMY, LEFT BREAST: Status: ACTIVE | Noted: 2021-07-14

## 2021-07-15 ENCOUNTER — HOSPITAL ENCOUNTER (OUTPATIENT)
Dept: RADIATION ONCOLOGY | Facility: HOSPITAL | Age: 76
Setting detail: RADIATION/ONCOLOGY SERIES
End: 2021-07-15

## 2021-07-15 ENCOUNTER — CONSULT (OUTPATIENT)
Dept: RADIATION ONCOLOGY | Facility: HOSPITAL | Age: 76
End: 2021-07-15

## 2021-07-15 ENCOUNTER — OFFICE VISIT (OUTPATIENT)
Dept: SURGERY | Age: 76
End: 2021-07-15

## 2021-07-15 VITALS — SYSTOLIC BLOOD PRESSURE: 124 MMHG | DIASTOLIC BLOOD PRESSURE: 72 MMHG | HEART RATE: 80 BPM

## 2021-07-15 VITALS — HEART RATE: 64 BPM | WEIGHT: 194.8 LBS | OXYGEN SATURATION: 94 % | BODY MASS INDEX: 38.04 KG/M2

## 2021-07-15 DIAGNOSIS — C50.512 MALIGNANT NEOPLASM OF LOWER-OUTER QUADRANT OF LEFT BREAST OF FEMALE, ESTROGEN RECEPTOR POSITIVE (HCC): Primary | ICD-10-CM

## 2021-07-15 DIAGNOSIS — Z98.890 STATUS POST LEFT BREAST LUMPECTOMY: ICD-10-CM

## 2021-07-15 DIAGNOSIS — C50.912 MALIGNANT NEOPLASM OF LEFT BREAST IN FEMALE, ESTROGEN RECEPTOR POSITIVE, UNSPECIFIED SITE OF BREAST (HCC): Primary | ICD-10-CM

## 2021-07-15 DIAGNOSIS — Z17.0 MALIGNANT NEOPLASM OF LEFT BREAST IN FEMALE, ESTROGEN RECEPTOR POSITIVE, UNSPECIFIED SITE OF BREAST (HCC): Primary | ICD-10-CM

## 2021-07-15 DIAGNOSIS — Z98.890 S/P LUMPECTOMY, LEFT BREAST: ICD-10-CM

## 2021-07-15 DIAGNOSIS — Z98.890 S/P LYMPH NODE BIOPSY: ICD-10-CM

## 2021-07-15 DIAGNOSIS — Z17.0 MALIGNANT NEOPLASM OF LOWER-OUTER QUADRANT OF LEFT BREAST OF FEMALE, ESTROGEN RECEPTOR POSITIVE (HCC): Primary | ICD-10-CM

## 2021-07-15 DIAGNOSIS — Z78.9 NON-SMOKER: ICD-10-CM

## 2021-07-15 PROCEDURE — 99024 POSTOP FOLLOW-UP VISIT: CPT | Performed by: SURGERY

## 2021-07-15 PROCEDURE — G0463 HOSPITAL OUTPT CLINIC VISIT: HCPCS | Performed by: RADIOLOGY

## 2021-07-19 ENCOUNTER — OFFICE VISIT (OUTPATIENT)
Dept: PRIMARY CARE CLINIC | Age: 76
End: 2021-07-19
Payer: MEDICARE

## 2021-07-19 VITALS
DIASTOLIC BLOOD PRESSURE: 82 MMHG | HEART RATE: 80 BPM | BODY MASS INDEX: 37.89 KG/M2 | HEIGHT: 60 IN | WEIGHT: 193 LBS | SYSTOLIC BLOOD PRESSURE: 126 MMHG | OXYGEN SATURATION: 97 % | TEMPERATURE: 97.9 F

## 2021-07-19 DIAGNOSIS — I10 ESSENTIAL HYPERTENSION: Chronic | ICD-10-CM

## 2021-07-19 DIAGNOSIS — Z17.0 MALIGNANT NEOPLASM OF LOWER-OUTER QUADRANT OF LEFT BREAST OF FEMALE, ESTROGEN RECEPTOR POSITIVE (HCC): ICD-10-CM

## 2021-07-19 DIAGNOSIS — C50.512 MALIGNANT NEOPLASM OF LOWER-OUTER QUADRANT OF LEFT BREAST OF FEMALE, ESTROGEN RECEPTOR POSITIVE (HCC): ICD-10-CM

## 2021-07-19 DIAGNOSIS — Z00.00 ROUTINE GENERAL MEDICAL EXAMINATION AT A HEALTH CARE FACILITY: Primary | ICD-10-CM

## 2021-07-19 DIAGNOSIS — E66.01 CLASS 2 SEVERE OBESITY DUE TO EXCESS CALORIES WITH SERIOUS COMORBIDITY AND BODY MASS INDEX (BMI) OF 38.0 TO 38.9 IN ADULT (HCC): Chronic | ICD-10-CM

## 2021-07-19 DIAGNOSIS — Z00.00 ANNUAL PHYSICAL EXAM: ICD-10-CM

## 2021-07-19 DIAGNOSIS — E78.2 MIXED HYPERLIPIDEMIA: Chronic | ICD-10-CM

## 2021-07-19 PROCEDURE — 1036F TOBACCO NON-USER: CPT | Performed by: FAMILY MEDICINE

## 2021-07-19 PROCEDURE — G8400 PT W/DXA NO RESULTS DOC: HCPCS | Performed by: FAMILY MEDICINE

## 2021-07-19 PROCEDURE — 4040F PNEUMOC VAC/ADMIN/RCVD: CPT | Performed by: FAMILY MEDICINE

## 2021-07-19 PROCEDURE — 1090F PRES/ABSN URINE INCON ASSESS: CPT | Performed by: FAMILY MEDICINE

## 2021-07-19 PROCEDURE — 1123F ACP DISCUSS/DSCN MKR DOCD: CPT | Performed by: FAMILY MEDICINE

## 2021-07-19 PROCEDURE — G0439 PPPS, SUBSEQ VISIT: HCPCS | Performed by: FAMILY MEDICINE

## 2021-07-19 PROCEDURE — 3017F COLORECTAL CA SCREEN DOC REV: CPT | Performed by: FAMILY MEDICINE

## 2021-07-19 PROCEDURE — G8427 DOCREV CUR MEDS BY ELIG CLIN: HCPCS | Performed by: FAMILY MEDICINE

## 2021-07-19 PROCEDURE — 99213 OFFICE O/P EST LOW 20 MIN: CPT | Performed by: FAMILY MEDICINE

## 2021-07-19 PROCEDURE — G8417 CALC BMI ABV UP PARAM F/U: HCPCS | Performed by: FAMILY MEDICINE

## 2021-07-19 ASSESSMENT — LIFESTYLE VARIABLES: HOW OFTEN DO YOU HAVE A DRINK CONTAINING ALCOHOL: 0

## 2021-07-19 ASSESSMENT — ENCOUNTER SYMPTOMS
RHINORRHEA: 0
COUGH: 0
SHORTNESS OF BREATH: 0
EYE ITCHING: 0
NAUSEA: 0
ABDOMINAL PAIN: 0
SORE THROAT: 0
COLOR CHANGE: 0

## 2021-07-19 ASSESSMENT — PATIENT HEALTH QUESTIONNAIRE - PHQ9
SUM OF ALL RESPONSES TO PHQ QUESTIONS 1-9: 0
1. LITTLE INTEREST OR PLEASURE IN DOING THINGS: 0
SUM OF ALL RESPONSES TO PHQ QUESTIONS 1-9: 0
SUM OF ALL RESPONSES TO PHQ QUESTIONS 1-9: 0
2. FEELING DOWN, DEPRESSED OR HOPELESS: 0
SUM OF ALL RESPONSES TO PHQ9 QUESTIONS 1 & 2: 0

## 2021-07-19 NOTE — PROGRESS NOTES
Pepe Garner is a 77 y.o. female who presents today for   Chief Complaint   Patient presents with    Medicare AWV    Annual Exam    Other     breast cancer f/u       HPI  Patient is here for awv and annual physical and f/u chronic conditions. Patient is here for 2 separate visits: annual wellness visit as well as acute and chronic issues. The below review of systems and physical exam applies to both encounters. Annual HPI:  Patient is here for an annual physical examination today. Patient notes no major changes in family medical history. Patient has not been as active secondary to Covid over the last year and has Covid vaccine x2. Patient notes that she has had some weight gain but overall no other major changes in generalized health    Acute/Chronic HPI:  Patient is also here for follow-up for history of breast cancer and associated risk to come with this. She has been not showing any signs of return of cancer that she is aware of. She has a history of hypertension hyperlipidemia as well and notes no recent chest pain or shortness of breath. Needs to be on chemotherapy for her breast cancer history. No change in PMH, family, social, or surgical history unless mentioned above. I have reviewed the above chief complaint and HPI details charted by staff and claim ownership of the documentation. Review of Systems   Constitutional: Negative for chills and fever. HENT: Negative for rhinorrhea and sore throat. Eyes: Negative for itching and visual disturbance. Respiratory: Negative for cough and shortness of breath. Cardiovascular: Negative for chest pain and palpitations. Gastrointestinal: Negative for abdominal pain and nausea. Endocrine: Negative for polydipsia and polyuria. Genitourinary: Negative for dysuria and frequency. Musculoskeletal: Negative for arthralgias and myalgias. Skin: Negative for color change and rash.    Allergic/Immunologic: Negative for environmental allergies and food allergies. Neurological: Negative for dizziness and light-headedness. Hematological: Negative for adenopathy. Does not bruise/bleed easily. Psychiatric/Behavioral: Negative for dysphoric mood. The patient is not nervous/anxious. Past Medical History:   Diagnosis Date    Breast cancer ([facility] [address] 04/2021     Left IDC    Cancer (HCC)     breast    Gout     Hyperlipidemia     Hypertension     Osteoarthritis     Vertigo        Current Outpatient Medications   Medication Sig Dispense Refill    letrozole (FEMARA) 2.5 MG tablet Take 1 tablet by mouth daily 30 tablet 3    montelukast (SINGULAIR) 10 MG tablet Take 1 tablet by mouth daily 30 tablet 5    Calcium Carbonate-Vit D-Min (CALCIUM 1200) 9229-1796 MG-UNIT CHEW Take 1 tablet by mouth daily       metoprolol tartrate (LOPRESSOR) 25 MG tablet Take 1 tablet by mouth twice daily (Patient taking differently: Take 25 mg by mouth 2 times daily Take 1   25mg tablet by mouth twice daily) 180 tablet 3    lisinopril (PRINIVIL;ZESTRIL) 40 MG tablet Take 1 tablet by mouth once daily (Patient taking differently: Take 40 mg by mouth daily ) 90 tablet 0    lovastatin (MEVACOR) 40 MG tablet Take 1 tablet by mouth nightly 90 tablet 0    allopurinol (ZYLOPRIM) 100 MG tablet Take 2 tablets by mouth daily 180 tablet 3    Compression Stockings MISC by Does not apply route Low to medium compression stocking just below knee 1 each 0    multivitamin (THERAGRAN) per tablet Take 1 tablet by mouth daily.  aspirin 81 MG EC tablet Take 81 mg by mouth daily.  fish oil-omega-3 fatty acids 1000 MG capsule Take 1 g by mouth daily       HYDROcodone-acetaminophen (NORCO) 5-325 MG per tablet Take 1 tablet by mouth every 6 hours as needed for Pain.  12 tablet 0    mupirocin (BACTROBAN) 2 % ointment Apply to each nostril BID 5 days prior to surgery (Patient not taking: Reported on 7/2/2021) 1 Tube 0     No current facility-administered medications for drainage. Nose: Nose normal. No nasal deformity or rhinorrhea. Mouth/Throat:      Mouth: Mucous membranes are not pale and not dry. Pharynx: Uvula midline. Eyes:      General: Lids are normal. No scleral icterus. Right eye: No discharge. Left eye: No discharge. Conjunctiva/sclera: Conjunctivae normal.      Pupils: Pupils are equal, round, and reactive to light. Neck:      Thyroid: No thyromegaly. Trachea: Phonation normal. No tracheal deviation. Cardiovascular:      Rate and Rhythm: Normal rate and regular rhythm. No extrasystoles are present. Heart sounds: Normal heart sounds, S1 normal and S2 normal. No murmur heard. Pulmonary:      Effort: Pulmonary effort is normal. No respiratory distress. Breath sounds: Normal breath sounds. No wheezing, rhonchi or rales. Abdominal:      General: Bowel sounds are normal. There is no distension. Palpations: Abdomen is soft. Tenderness: There is no abdominal tenderness. There is no guarding. Hernia: No hernia is present. Musculoskeletal:         General: No tenderness. Normal range of motion. Cervical back: Normal range of motion and neck supple. No tenderness or bony tenderness. Thoracic back: Normal. No tenderness or bony tenderness. Lumbar back: Normal. No tenderness or bony tenderness. Right lower leg: No swelling. No edema. Left lower leg: No swelling. No edema. Lymphadenopathy:      Head:      Right side of head: No submental, submandibular or tonsillar adenopathy. Left side of head: No submental, submandibular or tonsillar adenopathy. Cervical: No cervical adenopathy. Upper Body:      Right upper body: No supraclavicular adenopathy. Left upper body: No supraclavicular adenopathy. Skin:     General: Skin is warm and dry. Coloration: Skin is not pale.       Findings: No erythema (on head, neck, face, extremities) or rash (on extremities, head, neck, face). Nails: There is no clubbing. Neurological:      Mental Status: She is alert and oriented to person, place, and time. Motor: No tremor or seizure activity. Gait: Gait normal.      Deep Tendon Reflexes: Reflexes are normal and symmetric. Psychiatric:         Mood and Affect: Mood is not anxious or depressed. Speech: Speech normal.         Behavior: Behavior normal.         Thought Content: Thought content normal.         Cognition and Memory: Cognition is not impaired. Judgment: Judgment normal.         Assessment:    ICD-10-CM    1. Routine general medical examination at a health care facility  Z00.00    2. Malignant neoplasm of lower-outer quadrant of left breast of female, estrogen receptor [facility]  C50.512     Z17.0    3. Mixed hyperlipidemia  E78.2    4. Essential hypertension  I10 Lipid Panel     CBC     TSH without Reflex     Comprehensive Metabolic Panel   5. Class 2 severe obesity due to excess calories with serious comorbidity and body mass index (BMI) of 38.0 to 38.9 in adult ([facility])  E66.01     Z68.38    6. Annual physical exam  Z00.00        Plan:   Plan #1: Annual exam  I have reviewed and assessed the patient's current health status, risk factors, and progress for available preventative care. Patient received approximately 5 minutes of counseling on COVID-19 pandemic in regards to hygiene, symptoms, following public guidelines, and precautions to take. Patient received additional 5 minutes of counseling on covid vaccination data and need to vaccinate when available. Patient received age-related anticipatory guidance in regards to personal and public health as well as USPSTF evidence based guidelines for screening and prevention. This included the need for regular generalized activity and exercise as tolerated and a diet high in plant based fiber with a well balanced diet.      Plan #2: Acute and chronic conditions  Cont treatment for breast cancer. Continue laboratory observation for this at the cellular level  Orders Placed This Encounter   Procedures    Lipid Panel     Standing Status:   Future     Standing Expiration Date:   7/19/2022     Order Specific Question:   Is Patient Fasting?/# of Hours     Answer:   yes/8hrs    CBC     Standing Status:   Future     Standing Expiration Date:   7/19/2022    TSH without Reflex     Standing Status:   Future     Standing Expiration Date:   7/19/2022    Comprehensive Metabolic Panel     Standing Status:   Future     Standing Expiration Date:   7/19/2022     No orders of the defined types were placed in this encounter. There are no discontinued medications. Patient Instructions     Personalized Preventive Plan for Evelyn Concepcion - 7/19/2021  Medicare offers a range of preventive health benefits. Some of the tests and screenings are paid in full while other may be subject to a deductible, co-insurance, and/or copay. Some of these benefits include a comprehensive review of your medical history including lifestyle, illnesses that may run in your family, and various assessments and screenings as appropriate. After reviewing your medical record and screening and assessments performed today your provider may have ordered immunizations, labs, imaging, and/or referrals for you. A list of these orders (if applicable) as well as your Preventive Care list are included within your After Visit Summary for your review. Other Preventive Recommendations:    · A preventive eye exam performed by an eye specialist is recommended every 1-2 years to screen for glaucoma; cataracts, macular degeneration, and other eye disorders. · A preventive dental visit is recommended every 6 months. · Try to get at least 150 minutes of exercise per week or 10,000 steps per day on a pedometer . · Order or download the FREE \"Exercise & Physical Activity: Your Everyday Guide\" from [employer].  Call 1-850.768.6109 or search [employer] on [address] [place] [address]. · You need 1155-3091 mg of calcium and 7733-6265 IU of vitamin D per day. It is possible to meet your calcium requirement with diet alone, but a vitamin D supplement is usually necessary to meet this goal.  · When exposed to the sun, use a sunscreen that protects against both UVA and UVB radiation with an SPF of 30 or greater. Reapply every 2 to 3 hours or after sweating, drying off with a towel, or swimming. · Always wear a seat belt when traveling in a car. Always wear a helmet when riding a bicycle or motorcycle. Patient given educational handouts and has had all questions answered. Patient voices understanding and agrees to plans along with risks and benefits of plan. Patient isinstructed to continue prior meds, diet, and exercise plans unless instructed otherwise. Patient agrees to follow up as instructed and sooner if needed. Patient agrees to go to ER if condition becomes emergent. Notesmay be completed with dictation device and spelling errors may occur. Materials may be copied and pasted from a notepad outside of EMR, all of which, I, Dr. King Breanna MD, take sole intellectual ownership of and have approved adding to my note. Return in about 6 months (around 1/19/2022) for OV (JEANNIE-Shashi), HTN.

## 2021-07-19 NOTE — PROGRESS NOTES
Medicare Annual Wellness Visit  Name: Ana Maria Moura Date: 2021   MRN: [id number] Sex: Female   Age: 77 y.o. Ethnicity: Non- / Non    : 1945 Race: White (non-)      Chepe Vazquez is here for Medicare AWV, Annual Exam, and Other (breast cancer f/u)    Screenings for behavioral, psychosocial and functional/safety risks, and cognitive dysfunction are all negative except as indicated below. These results, as well as other patient data from the [address] form, are documented in Flowsheets linked to this Encounter. No Known Allergies      Prior to Visit Medications    Medication Sig Taking? Authorizing Provider   letrozole (FEMARA) 2.5 MG tablet Take 1 tablet by mouth daily Yes Radha Li MD   montelukast (SINGULAIR) 10 MG tablet Take 1 tablet by mouth daily Yes Андрей Young MD   Calcium Carbonate-Vit D-Min (CALCIUM 1200) 2922-1860 MG-UNIT CHEW Take 1 tablet by mouth daily  Yes Historical Provider, MD   metoprolol tartrate (LOPRESSOR) 25 MG tablet Take 1 tablet by mouth twice daily  Patient taking differently: Take 25 mg by mouth 2 times daily Take 1   25mg tablet by mouth twice daily Yes Lizabeth Olguin MD   lisinopril (PRINIVIL;ZESTRIL) 40 MG tablet Take 1 tablet by mouth once daily  Patient taking differently: Take 40 mg by mouth daily  Yes Lizabeth Olguin MD   lovastatin (MEVACOR) 40 MG tablet Take 1 tablet by mouth nightly Yes Lizabeth Olguin MD   allopurinol (ZYLOPRIM) 100 MG tablet Take 2 tablets by mouth daily Yes Lizabeth Olguin MD   Compression Stockings MISC by Does not apply route Low to medium compression stocking just below knee Yes Lizabeth Olguin MD   multivitamin [facility]) per tablet Take 1 tablet by mouth daily. Yes Historical Provider, MD   aspirin 81 MG EC tablet Take 81 mg by mouth daily.    Yes Historical Provider, MD   fish oil-omega-3 fatty acids 1000 MG capsule Take 1 g by mouth daily  Yes Historical Provider, MD HYDROcodone-acetaminophen (NORCO) 5-325 MG per tablet Take 1 tablet by mouth every 6 hours as needed for Pain.   Josseline Atkins MD   [facility]) 2 % ointment Apply to each nostril BID 5 days prior to surgery  Patient not taking: Reported on 7/2/2021  Josseline Atkins MD         Past Medical History:   Diagnosis Date    Breast cancer [facility]) 04/2021     Left IDC    Cancer ([address]     breast    Gout     Hyperlipidemia     Hypertension     Osteoarthritis     Vertigo        Past Surgical History:   Procedure Laterality Date    ANUS SURGERY      polyp removed    BREAST BIOPSY      Right-benign    BREAST LUMPECTOMY Left 6/4/2021    LEFT LUMPECTOMY AND SNB, PREOP US AND INTRAOP US GUIDED NEEDLE LOC, BIOSORB, FLAPS, MARGIN PRBE, BILATERAL PEC BLOCK performed by Josseline Atkins MD at [address] [place] [address] Right 6/4/2021    EXCISION RIGHT BREAST LESION WITH NEEDLE LOCALIZATION performed by Josseline Atkins MD at [place] [address]  2013    KNEE SURGERY      bilateral replacement    RECTAL SURGERY  8/24/2012    Rigid proctoscopy with transanal excision of rectal polyp    TUBAL LIGATION  1984    US BREAST NEEDLE BIOPSY RIGHT Right 6/2/2021    US BREAST NEEDLE BIOPSY RIGHT 6/2/2021 LPS GENERAL SURGERY         Family History   Problem Relation Age of Onset    Heart Disease Mother     High Blood Pressure Mother     Heart Disease Father     High Blood Pressure Father     Cancer Sister 79        Ovarian cancer    Cancer Maternal Aunt 61        Ovarian cancer    Cancer Sister 79        squamous cell skin cancer       CareTeam (Including outside providers/suppliers regularly involved in providing care):   Patient Care Team:  Harjeet Garcia MD as PCP - General (Family Medicine)  Harjeet Garcia MD as PCP - [facility] Empaneled Provider    Wt Readings from Last 3 Encounters:   07/19/21 193 lb (87.5 kg)   07/02/21 193 lb 11.2 oz (87.9 kg)   06/23/21 193 lb (87.5 kg) Vitals:    07/19/21 1422   BP: 126/82   Site: Left Upper Arm   Position: Sitting   Pulse: 80   Temp: 97.9 °F (36.6 °C)   SpO2: 97%   Weight: 193 lb (87.5 kg)   Height: 5' (1.524 m)     Body mass index is 37.69 kg/m². Based upon direct observation of the patient, evaluation of cognition reveals recent and remote memory intact. Patient's complete Health Risk Assessment and screening values have been reviewed and are found in Flowsheets. The following problems were reviewed today and where indicated follow up appointments were made and/or referrals ordered. Positive Risk Factor Screenings with Interventions:            General Health and ACP:  General  In general, how would you say your health is?: Good  In the past 7 days, have you experienced any of the following?  New or Increased Pain, New or Increased Fatigue, Loneliness, Social Isolation, Stress or Anger?: None of These  Do you get the social and emotional support that you need?: Yes  Do you have a Living Will?: (!) No  Advance Directives     Power of [address] [place] [address] Will ACP-Advance Directive ACP-Power of     Not on File Not on File Not on File Not on File      General Health Risk Interventions:  · No Living Will: Advance Care Planning addressed with patient today    Health Habits/Nutrition:  Health Habits/Nutrition  Do you exercise for at least 20 minutes 2-3 times per week?: Yes  Have you lost any weight without trying in the past 3 months?: No  Do you eat only one meal per day?: No  Have you seen the dentist within the past year?: Yes  Body mass index: (!) 37.69  Health Habits/Nutrition Interventions:  · Inadequate physical activity:  educational materials provided to promote increased physical activity  · Nutritional issues:  educational materials for healthy, well-balanced diet provided       Personalized Preventive Plan   Current Health Maintenance Status  Immunization History   Administered Date(s) Administered    COVID-19, Moderna, PF, 100mcg/0.5mL 02/23/2021, 03/23/2021    DTaP 04/17/2015    Influenza, Triv, 3 Years and older, IM (Afluria (5 yrs and older) 10/13/2020    Pneumococcal Conjugate 13-valent ([id number]) 04/17/2015    Pneumococcal Polysaccharide ([id number]) 04/17/2017    Zoster Recombinant (Shingrix) 04/17/2019, 07/23/2019        Health Maintenance   Topic Date Due    Lipid screen  04/23/2019    Annual Wellness Visit (AWV)  07/08/2021    Flu vaccine (1) 09/01/2021    Potassium monitoring  06/01/2022    Creatinine monitoring  06/01/2022    Breast cancer screen  06/02/2022    DTaP/Tdap/Td vaccine (2 - Tdap) 04/17/2025    Colon cancer screen colonoscopy  08/30/2027    Shingles Vaccine  Completed    Pneumococcal 65+ years Vaccine  Completed    COVID-19 Vaccine  Completed    DEXA (modify frequency per FRAX score)  Addressed    Hepatitis C screen  Addressed    Hepatitis A vaccine  Aged Out    Hepatitis B vaccine  Aged Out    Hib vaccine  Aged Out    Meningococcal (ACWY) vaccine  Aged Out     Recommendations for [place] Due: see orders and patient instructions/AVS.  . Recommended screening schedule for the next 5-10 years is provided to the patient in written form: see Patient Instructions/AVS.    David Reagan was seen today for medicare awv, annual exam and other. Diagnoses and all orders for this visit:    Routine general medical examination at a health care facility    Malignant neoplasm of lower-outer quadrant of left breast of female, estrogen receptor positive ([facility] [address]    Mixed hyperlipidemia    Essential hypertension  -     Lipid Panel; Future  -     CBC; Future  -     TSH without Reflex; Future  -     Comprehensive Metabolic Panel;  Future    Class 2 severe obesity due to excess calories with serious comorbidity and body mass index (BMI) of 38.0 to 38.9 in adult [facility])    Annual physical exam

## 2021-07-19 NOTE — PATIENT INSTRUCTIONS
Personalized Preventive Plan for Cleopatra Barksdale - 7/19/2021  Medicare offers a range of preventive health benefits. Some of the tests and screenings are paid in full while other may be subject to a deductible, co-insurance, and/or copay. Some of these benefits include a comprehensive review of your medical history including lifestyle, illnesses that may run in your family, and various assessments and screenings as appropriate. After reviewing your medical record and screening and assessments performed today your provider may have ordered immunizations, labs, imaging, and/or referrals for you. A list of these orders (if applicable) as well as your Preventive Care list are included within your After Visit Summary for your review. Other Preventive Recommendations:    · A preventive eye exam performed by an eye specialist is recommended every 1-2 years to screen for glaucoma; cataracts, macular degeneration, and other eye disorders. · A preventive dental visit is recommended every 6 months. · Try to get at least 150 minutes of exercise per week or 10,000 steps per day on a pedometer . · Order or download the FREE \"Exercise & Physical Activity: Your Everyday Guide\" from [employer]. Call 8-684.752.8384 or search [employer] online. · You need 9038-1900 mg of calcium and 5117-6667 IU of vitamin D per day. It is possible to meet your calcium requirement with diet alone, but a vitamin D supplement is usually necessary to meet this goal.  · When exposed to the sun, use a sunscreen that protects against both UVA and UVB radiation with an SPF of 30 or greater. Reapply every 2 to 3 hours or after sweating, drying off with a towel, or swimming. · Always wear a seat belt when traveling in a car. Always wear a helmet when riding a bicycle or motorcycle.

## 2021-07-22 NOTE — PROGRESS NOTES
MEDICAL ONCOLOGY PROGRESS NOTE    Pt Name: Bre Harris  MRN: [id number]  YOB: 1945  Date of evaluation: 7/30/2021    HISTORY OF PRESENT ILLNESS:    The patient is a very pleasant 77years old female who is currently on adjuvant endocrine therapy with letrozole. She is currently undergoing adjuvant radiation therapy. She comes today to discuss results of Oncotype DX. She denies any new complaints. Diagnosis  · Infiltrating ductal carcinoma, left breast April 2021  · Favor grade 1  · ER 70%, AR 77%, Her-2 ramesh-negative, Ki67 8%-low  · MammaPrint Luminal type A, low risk  · Oncotype DX recurrence score: 6    Treatment Summary  · 05/18/2021 Tamoxifen 20 mg daily  · 06/04/2021 Left breast lumpectomy with negative SLNB, 0/8 positive LN   · 07/02/2021-Switched to Letrozole 2.5mg daily    Cancer History  Bhavya Calabrese was first seen by me on 7/3/2021. She was referred by Dr. Celeste Damon for history of left breast IDC. This was in a screening detected lesion. · 4/15/21 Bilateral screening mammogram Mendoza Lilly): A stellate 1.2cm mass identified centrally and inferiorly in the left breast. It is a new finding. A left diagnostic mammogram was performed consisting of spot compression views. This confirms the presence of a stellate mass in the left breast. A targeted, high resolution left breast ultrasound demonstrates intense acoustic shadowing t the 6 o'clock position in the left breast from the 1.2 mass. Mammographic appearance of the right breast is stable. BI-RADS category 4-suspicious for malignancy. Further evaluation is needed. · 4/15/21 US left breast including axilla Jotua Maxx): There is a focal area of acoustic shadowing identified at the 6 o'clock position in the left breast corresponding to the stellate mass seen on mammogram. There is so much shadowing it is difficult to tell what is mass versus acoustic shadowing. My best estimate of diameter is 1.2cm.  Color Doppler demonstrates little to no blood flow to the mass. This is highly suspicious appearing. BI-RADS category 4, suspicious. Percutaneous biopsy is recommended. · 4/26/21- Breast, left breast needle core biopsies at 5 o'clock position: Infiltrating ductal carcinoma, no special type, favor grade 1. Infiltrating carcinoma measures 0.8 cm in greatest linear dimension and is present in multiple cores. ER 70%, DE 77%, Her-2 ramesh-negative, Ki67 8%-low. Cytology: Lymph node, left axillary lymph node fine-needle aspiration, smears and ThinPrep: Polytypic small round lymphocytes, negative for evidence of metastasis. · 5/10/21 MRI bilateral breast: Right breast with a 0.9 cm focus of enhancement as described above. Recommend second look ultrasound. Redemonstration of known left breast cancer, with a greater size of enhancement than appreciated by ultrasound. · BI-RADS CATEGORY 0: NEED ADDITIONAL EVALUATION AND/OR PRIOR MAMMOGRAMS FOR COMPARISON. Management Recommendation: Recall for additional imaging. · 5/17/21 US right breast: Suspicious right breast nodule in patient with known left breast cancer Recommendations: Ultrasound-guided biopsy prior to definitive surgical therapy. · 6/2/21 US left breast: Targeted ultrasound demonstrates the previously described cancer at the 5:00 position in the left breast. It measures 1.5 x 1.0 x 0.60 cm. It is hypoechoic with irregular margins and some posterior acoustic shadowing. It is 5 cm from the nipple and 1.5 cm deep. It is unchanged from our last exam and the skin was marked appropriately. Recommendations: Surgical management when appropriate  · 6/2/21 Breast, right breast needle core biopsies at 12 o'clock position: Fibroadenoma with focal involvement by atypical ductal hyperplasia. COMMENT:    Intradepartmental consultation was obtained with Dr. Jessie Kee who concurs with the above diagnostic impression.    · 6/4/21 Breast, right breast excisional biopsy: Fibroadenoma with focal involvement by atypical ductal hyperplasia. Microfocus of atypical ductal hyperplasia identified separately from the fibroadenoma, margins negative. · 6/4/2021she underwent a breast, left lumpectomy: Invasive ductal carcinoma, no special type, grade 1. Invasive carcinoma measures 1.5 cm in greatest dimension microscopically. Invasive carcinoma is located 0.4 cm from the nearest medial surgical excision margin. Focal low-grade ductal carcinoma in situ is identified in conjunction with the invasive lesion. In situ carcinoma is located greater than 1.0 cm from all evaluated margins. Prior biopsy site identified. Sections of skin, negative for evidence of malignancy. Breast, excision of additional left breast medial margin: Focal atypical ductal hyperplasia, margins negative. Negative for evidence of residual in situ or invasive carcinoma. Breast, excision of additional left breast deep margin: Benign adipose tissue and skeletal muscle. Lymph nodes, left sentinel lymph node biopsies: 8 lymph nodes, negative for evidence of malignancy. AJCC STAGE: pT1c, pN0, pMx   · 6/21/21 Oncotype DX recurrence score: 6  · 7/3/2021she was first seen by me. I reviewed medical records. Essentially, stage I, low-grade disease. Tumor measured 1.5 cm. Oncotype DX recommended. Refer to radiation oncology. We will switch her to a letrozole. Bone mineral density recommended  · 7/8/21 Bone density Kristopher Waddell): Osteopenia. Left femur neck T-score -1.5. · 7/30/2021Oncotype DX results discussed. No need for adjuvant chemotherapy. Continue radiation. Continue letrozole after radiation as adjuvant treatment for 5 years. Bone density reviewed. Osteopenia. Continue calcium vitamin D.       Past Medical History:    Past Medical History:   Diagnosis Date    Breast cancer ([address] 04/2021     Left IDC    Cancer ([facility] [address]     breast    Gout     Hyperlipidemia     Hypertension     Osteoarthritis     Vertigo        Past Surgical History:    Past Surgical History: Procedure Laterality Date    ANUS SURGERY      polyp removed    BREAST BIOPSY      Right-benign    BREAST LUMPECTOMY Left 6/4/2021    LEFT LUMPECTOMY AND SNB, PREOP US AND INTRAOP US GUIDED NEEDLE LOC, BIOSORB, FLAPS, MARGIN PRBE, BILATERAL PEC BLOCK performed by Aminta Coughlin MD at [address] [place] [address] Right 6/4/2021    EXCISION RIGHT BREAST LESION WITH NEEDLE LOCALIZATION performed by Aminta Coughlin MD at [place] [address] HYSTERECTOMY  2013    KNEE SURGERY      bilateral replacement    RECTAL SURGERY  8/24/2012    Rigid proctoscopy with transanal excision of rectal polyp    TUBAL LIGATION  1984    US BREAST NEEDLE BIOPSY RIGHT Right 6/2/2021    US BREAST NEEDLE BIOPSY RIGHT 6/2/2021 LPS GENERAL SURGERY       Social History:    Marital status:   Smoking status: no  ETOH status: no  [facility] [place]    Family History:   Family History   Problem Relation Age of Onset    Heart Disease Mother     High Blood Pressure Mother     Heart Disease Father     High Blood Pressure Father     Cancer Sister 79        Ovarian cancer    Cancer Maternal Aunt 60        Ovarian cancer    Cancer Sister 79        squamous cell skin cancer       Current Hospital Medications:    Current Outpatient Medications   Medication Sig Dispense Refill    letrozole (FEMARA) 2.5 MG tablet Take 1 tablet by mouth daily 30 tablet 3    montelukast (SINGULAIR) 10 MG tablet Take 1 tablet by mouth daily 30 tablet 5    Calcium Carbonate-Vit D-Min (CALCIUM 1200) 6798-1877 MG-UNIT CHEW Take 1 tablet by mouth daily       metoprolol tartrate (LOPRESSOR) 25 MG tablet Take 1 tablet by mouth twice daily (Patient taking differently: Take 25 mg by mouth 2 times daily Take 1   25mg tablet by mouth twice daily) 180 tablet 3    lisinopril (PRINIVIL;ZESTRIL) 40 MG tablet Take 1 tablet by mouth once daily (Patient taking differently: Take 40 mg by mouth daily ) 90 tablet 0    lovastatin (MEVACOR) 40 MG tablet Take 1 tablet by mouth nightly 90 tablet 0    allopurinol (ZYLOPRIM) 100 MG tablet Take 2 tablets by mouth daily 180 tablet 3    Compression Stockings MISC by Does not apply route Low to medium compression stocking just below knee 1 each 0    multivitamin (THERAGRAN) per tablet Take 1 tablet by mouth daily.  aspirin 81 MG EC tablet Take 81 mg by mouth daily.  fish oil-omega-3 fatty acids 1000 MG capsule Take 1 g by mouth daily       HYDROcodone-acetaminophen (NORCO) 5-325 MG per tablet Take 1 tablet by mouth every 6 hours as needed for Pain. 12 tablet 0    mupirocin (BACTROBAN) 2 % ointment Apply to each nostril BID 5 days prior to surgery (Patient not taking: Reported on 7/2/2021) 1 Tube 0     No current facility-administered medications for this visit.        Allergies: No Known Allergies      Subjective   REVIEW OF SYSTEMS:   CONSTITUTIONAL: no fever, no night sweats, no fatigue;  HEENT: no blurring of vision, no double vision, no hearing difficulty, no tinnitus, no ulceration, no dysplasia, no epistaxis;  LUNGS: no cough, no hemoptysis, no wheeze,  no shortness of breath;  CARDIOVASCULAR: no palpitation, no chest pain, no shortness of breath;  GI: no abdominal pain, no nausea, no vomiting, no diarrhea, no constipation;  FABRICIO: no dysuria, no hematuria, no frequency or urgency, no nephrolithiasis;  MUSCULOSKELETAL: no joint pain, occasional LE swelling, no stiffness;  ENDOCRINE: no polyuria, no polydipsia, no cold or heat intolerance;  HEMATOLOGY: no easy bruising or bleeding, no history of clotting disorder;  DERMATOLOGY: no skin rash, no eczema, no pruritus;  PSYCHIATRY: no depression, no anxiety, no panic attacks, no suicidal ideation, no homicidal ideation;  NEUROLOGY: no syncope, no seizures, no numbness or tingling of hands, no numbness or tingling of feet, no paresis;    Objective   /82   Pulse 85   Ht 5' (1.524 m)   Wt 193 lb 14.4 oz (88 kg)   SpO2 96%   BMI 37.87 kg/m²     PHYSICAL EXAM:  CONSTITUTIONAL: Alert, appropriate, no acute distress  EYES: Non icteric, EOM intact, pupils equal round   ENT: Mucus membranes moist, no oral pharyngeal lesions, external inspection of ears and nose are normal  NECK: Supple, no masses. No palpable thyroid mass  CHEST/LUNGS: CTA bilaterally, normal respiratory effort   CARDIOVASCULAR: RRR, no murmurs. No lower extremity edema  ABDOMEN: soft non-tender, active bowel sounds, no HSM. No palpable masses  EXTREMITIES: warm, full ROM in all 4 extremities, no focal weakness. SKIN: warm, dry with no rashes or lesions  LYMPH: No cervical, clavicular, axillary, or inguinal lymphadenopathy  NEUROLOGIC: follows commands, non focal   PSYCH: mood and affect appropriate. Alert and oriented to time, place, person      LABORATORY RESULTS REVIEWED/ANALYZED BY ME:  6/21/21 Oncotype DX recurrence score: 6  Lab Results   Component Value Date    WBC 7.14 07/30/2021    HGB 13.8 07/30/2021    HCT 40.3 07/30/2021    MCV 91.2 07/30/2021     (L) 07/30/2021     Lab Results   Component Value Date    NEUTROABS 5.06 07/30/2021         RADIOLOGY STUDIES REVIEWED BY ME:  7/8/21 Bone density Samantarenu Johnson): Osteopenia. Left femur neck T-score -1.5. ASSESSMENT:    No orders of the defined types were placed in this encounter. Nica Pike was seen today for follow-up. Diagnoses and all orders for this visit:    Infiltrating ductal carcinoma of breast, left (HCC)    Er+ IL+ carcinoma of breast, left [facility])    Care plan discussed with patient    Lymphedema of upper extremity      IDC left breast, Grade 1 ER 70%, IL 77%, HER-2 negative, pT1 cN0 M0, MammaPrint low risk luminal type a  Essentially, stage I breast cancer   Switched from tamoxifen to Femara for at least 5 years. Oncotype DX recurrence score: 6  Continue treatment radiation  Oncotype DX results discussed. No need for adjuvant chemotherapy. Continue radiation.   Continue letrozole after radiation as

## 2021-07-23 ENCOUNTER — HOSPITAL ENCOUNTER (OUTPATIENT)
Dept: PHYSICAL THERAPY | Age: 76
Setting detail: THERAPIES SERIES
Discharge: HOME | End: 2021-07-23
Payer: MEDICARE

## 2021-07-23 PROCEDURE — 97161 PT EVAL LOW COMPLEX 20 MIN: CPT

## 2021-07-23 PROCEDURE — 97110 THERAPEUTIC EXERCISES: CPT

## 2021-07-23 NOTE — PROGRESS NOTES
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 Delilah Han reports she has swelling in both of her legs but does not notice swelling in her arms. She states she has been busy in her garden.   Pain Screening  Patient Currently in Pain: Denies  Vital Signs  Patient Currently in Pain: Denies    Vision/Hearing  Vision  Vision: Within Functional Limits  Hearing  Hearing: Within functional limits    Orientation  Orientation  Overall Orientation Status: Within Normal Limits    Social/Functional History  Social/Functional History  Lives With: Spouse  Type of Home: House  Transfer Assistance: Independent  Active : Yes  Occupation: Retired  Leisure & Hobbies: Gardening    Objective     Observation/Palpation  Posture: Good  Edema: Edema noted BLEs    AROM RUE (degrees)  RUE AROM : WFL  AROM LUE (degrees)  LUE AROM : WFL    Strength RLE  Strength RLE: WFL  Strength LLE  Strength LLE: WFL  Strength RUE  Strength RUE: WFL  Strength LUE  Strength LUE: WFL     Additional Measures  Special Tests: L-Dex score: 10.2         LLIS  10% impairment  Other: Circumferential measurements from distal to proximal:   RUE: 19.4, 17.1, 20, 25, 27.7, 28, 30.5, 34.6, 36.3, 39.5         LUE: 19.4, 17.1, 20.5, 25.7, 28.9, 29, 32.7, 37, 37.8, 39.3  Sensation  Overall Sensation Status: WNL     Transfers  Sit to Stand: Independent  Stand to sit: Independent       Ambulation  Ambulation?: Yes  Ambulation 1  Surface: level tile  Assistance: Independent  Gait Deviations: None  Balance  Posture: Good  Exercises  Exercise 1: Measurement for UE garment  Exercise 2: Wrist ext/flexion  Exercise 3: Elbow flexion/ext  Exercise 4: Fist x 10  Exercise 5: Shoulder flexion  Exercise 6: Shoulder abduction  Exercise 7: Arm circles  Exercise 8: UE bike                      Assessment   Conditions Requiring Skilled Therapeutic Intervention  Assessment: Ms. Norma Anna presented s/p L lumpectomy for lymphedema monitoring. Patient showed slightly increased circumferential measurements on LUE as noted in measurement section and scored 10.2 on L Dex score which is out of normal range however we do not have a pre surgical baseline for this patient. I recommend ordering compression sleeve at this time and using the 4 week compression protocol to reduce likelihood of progression of swelling. Patient verbalized understanding. Garment is being ordered.   Treatment Diagnosis: LUE lymphedema  Prognosis: Good  Decision Making: Low Complexity  Treatment Initiated : Order compression garment         Plan   Plan  Plan Comment: Fit for garment to wear for 4 weeks x 12 hours per day then return to clinic after 4 weeks; teach exercises to patient to perform as needed    Goals  Short term goals  Time Frame for Short term goals: 1-2 weeks  Short term goal 1: Therapist will recommend appropriate UE garment to reduce chance of progressing with lymphedema  Short term goal 2: Patient will learn to don and doff garment safely  Long term goals  Time Frame for Long term goals : 4-6 weeks  Long term goal 1: Patient will wear UE compression garment 4 weeks x 12 hours per day as needed to reduce progression of lymphedema  Long term goal 2: Patient will verbalize signs and symptoms of lymphedema  Long term goal 3: Patient will verbalize long term care methods with lymphedema       Therapy Time   Individual Concurrent Group Co-treatment   Time In 1407         Time Out 1500         Minutes 53         Timed Code Treatment Minutes: 53 Minutes  Electronically signed by Radhames Quintanilla PT on 7/23/2021 at 5:05 PM

## 2021-07-30 ENCOUNTER — HOSPITAL ENCOUNTER (OUTPATIENT)
Dept: INFUSION THERAPY | Age: 76
Discharge: HOME | End: 2021-07-30
Payer: MEDICARE

## 2021-07-30 ENCOUNTER — HOSPITAL ENCOUNTER (OUTPATIENT)
Dept: RADIATION ONCOLOGY | Facility: HOSPITAL | Age: 76
Setting detail: RADIATION/ONCOLOGY SERIES
Discharge: HOME | End: 2021-07-30

## 2021-07-30 ENCOUNTER — OFFICE VISIT (OUTPATIENT)
Dept: HEMATOLOGY | Age: 76
End: 2021-07-30
Payer: MEDICARE

## 2021-07-30 VITALS
SYSTOLIC BLOOD PRESSURE: 136 MMHG | BODY MASS INDEX: 38.07 KG/M2 | WEIGHT: 193.9 LBS | DIASTOLIC BLOOD PRESSURE: 82 MMHG | HEART RATE: 85 BPM | OXYGEN SATURATION: 96 % | HEIGHT: 60 IN

## 2021-07-30 DIAGNOSIS — I10 ESSENTIAL HYPERTENSION: Chronic | ICD-10-CM

## 2021-07-30 DIAGNOSIS — I89.0 LYMPHEDEMA OF UPPER EXTREMITY: ICD-10-CM

## 2021-07-30 DIAGNOSIS — C50.912 INFILTRATING DUCTAL CARCINOMA OF BREAST, LEFT (HCC): Primary | ICD-10-CM

## 2021-07-30 DIAGNOSIS — Z79.811 ENCOUNTER FOR MONITORING AROMATASE INHIBITOR THERAPY: ICD-10-CM

## 2021-07-30 DIAGNOSIS — Z71.89 CARE PLAN DISCUSSED WITH PATIENT: ICD-10-CM

## 2021-07-30 DIAGNOSIS — Z51.81 ENCOUNTER FOR MONITORING AROMATASE INHIBITOR THERAPY: ICD-10-CM

## 2021-07-30 DIAGNOSIS — C50.912 ER+ PR+ CARCINOMA OF BREAST, LEFT (HCC): ICD-10-CM

## 2021-07-30 DIAGNOSIS — C80.1 MALIGNANT (PRIMARY) NEOPLASM, UNSPECIFIED (HCC): ICD-10-CM

## 2021-07-30 DIAGNOSIS — Z17.0 ER+ PR+ CARCINOMA OF BREAST, LEFT (HCC): ICD-10-CM

## 2021-07-30 LAB
ALBUMIN SERPL-MCNC: 4.1 G/DL (ref 3.5–5.2)
ALP BLD-CCNC: 63 U/L (ref 35–104)
ALT SERPL-CCNC: 19 U/L (ref 5–33)
ANION GAP SERPL CALCULATED.3IONS-SCNC: 11 MMOL/L (ref 7–19)
AST SERPL-CCNC: 23 U/L (ref 5–32)
BASOPHILS ABSOLUTE: 0.01 K/UL (ref 0.01–0.08)
BASOPHILS RELATIVE PERCENT: 0.1 % (ref 0.1–1.2)
BILIRUB SERPL-MCNC: 0.5 MG/DL (ref 0.2–1.2)
BUN BLDV-MCNC: 16 MG/DL (ref 8–23)
CALCIUM SERPL-MCNC: 9.5 MG/DL (ref 8.8–10.2)
CHLORIDE BLD-SCNC: 100 MMOL/L (ref 98–111)
CHOLESTEROL, TOTAL: 182 MG/DL (ref 160–199)
CO2: 29 MMOL/L (ref 22–29)
CREAT SERPL-MCNC: 0.7 MG/DL (ref 0.5–0.9)
EOSINOPHILS ABSOLUTE: 0.08 K/UL (ref 0.04–0.54)
EOSINOPHILS RELATIVE PERCENT: 1.1 % (ref 0.7–7)
GFR AFRICAN AMERICAN: >59
GFR NON-AFRICAN AMERICAN: >60
GLUCOSE BLD-MCNC: 83 MG/DL (ref 74–109)
HCT VFR BLD CALC: 40.3 % (ref 34.1–44.9)
HCT VFR BLD CALC: 42.7 % (ref 37–47)
HDLC SERPL-MCNC: 50 MG/DL (ref 65–121)
HEMOGLOBIN: 13.8 G/DL (ref 11.2–15.7)
HEMOGLOBIN: 14.1 G/DL (ref 12–16)
LDL CHOLESTEROL CALCULATED: 92 MG/DL
LYMPHOCYTES ABSOLUTE: 1.67 K/UL (ref 1.18–3.74)
LYMPHOCYTES RELATIVE PERCENT: 23.4 % (ref 19.3–53.1)
MCH RBC QN AUTO: 30.9 PG (ref 27–31)
MCH RBC QN AUTO: 31.2 PG (ref 25.6–32.2)
MCHC RBC AUTO-ENTMCNC: 33 G/DL (ref 33–37)
MCHC RBC AUTO-ENTMCNC: 34.2 G/DL (ref 32.3–35.5)
MCV RBC AUTO: 91.2 FL (ref 79.4–94.8)
MCV RBC AUTO: 93.4 FL (ref 81–99)
MONOCYTES ABSOLUTE: 0.32 K/UL (ref 0.24–0.82)
MONOCYTES RELATIVE PERCENT: 4.5 % (ref 4.7–12.5)
NEUTROPHILS ABSOLUTE: 5.06 K/UL (ref 1.56–6.13)
NEUTROPHILS RELATIVE PERCENT: 70.9 % (ref 34–71.1)
PDW BLD-RTO: 12.4 % (ref 11.7–14.4)
PDW BLD-RTO: 12.9 % (ref 11.5–14.5)
PLATELET # BLD: 140 K/UL (ref 182–369)
PLATELET # BLD: 168 K/UL (ref 130–400)
PMV BLD AUTO: 10.3 FL (ref 7.4–10.4)
PMV BLD AUTO: 10.8 FL (ref 9.4–12.3)
POTASSIUM SERPL-SCNC: 4.6 MMOL/L (ref 3.5–5)
RBC # BLD: 4.42 M/UL (ref 3.93–5.22)
RBC # BLD: 4.57 M/UL (ref 4.2–5.4)
SODIUM BLD-SCNC: 140 MMOL/L (ref 136–145)
TOTAL PROTEIN: 6.8 G/DL (ref 6.6–8.7)
TRIGL SERPL-MCNC: 202 MG/DL (ref 0–149)
TSH SERPL DL<=0.05 MIU/L-ACNC: 2.05 UIU/ML (ref 0.27–4.2)
WBC # BLD: 6 K/UL (ref 4.8–10.8)
WBC # BLD: 7.14 K/UL (ref 3.98–10.04)

## 2021-07-30 PROCEDURE — G8427 DOCREV CUR MEDS BY ELIG CLIN: HCPCS | Performed by: INTERNAL MEDICINE

## 2021-07-30 PROCEDURE — 99211 OFF/OP EST MAY X REQ PHY/QHP: CPT

## 2021-07-30 PROCEDURE — 77290 THER RAD SIMULAJ FIELD CPLX: CPT | Performed by: RADIOLOGY

## 2021-07-30 PROCEDURE — 99213 OFFICE O/P EST LOW 20 MIN: CPT | Performed by: INTERNAL MEDICINE

## 2021-07-30 PROCEDURE — 1123F ACP DISCUSS/DSCN MKR DOCD: CPT | Performed by: INTERNAL MEDICINE

## 2021-07-30 PROCEDURE — 77334 RADIATION TREATMENT AID(S): CPT | Performed by: RADIOLOGY

## 2021-07-30 PROCEDURE — 4040F PNEUMOC VAC/ADMIN/RCVD: CPT | Performed by: INTERNAL MEDICINE

## 2021-07-30 PROCEDURE — 1090F PRES/ABSN URINE INCON ASSESS: CPT | Performed by: INTERNAL MEDICINE

## 2021-07-30 PROCEDURE — G8400 PT W/DXA NO RESULTS DOC: HCPCS | Performed by: INTERNAL MEDICINE

## 2021-07-30 PROCEDURE — 3017F COLORECTAL CA SCREEN DOC REV: CPT | Performed by: INTERNAL MEDICINE

## 2021-07-30 PROCEDURE — 1036F TOBACCO NON-USER: CPT | Performed by: INTERNAL MEDICINE

## 2021-07-30 PROCEDURE — G8417 CALC BMI ABV UP PARAM F/U: HCPCS | Performed by: INTERNAL MEDICINE

## 2021-07-30 PROCEDURE — 85025 COMPLETE CBC W/AUTO DIFF WBC: CPT

## 2021-08-02 ENCOUNTER — HOSPITAL ENCOUNTER (OUTPATIENT)
Dept: RADIATION ONCOLOGY | Facility: HOSPITAL | Age: 76
Setting detail: RADIATION/ONCOLOGY SERIES
End: 2021-08-02

## 2021-08-06 PROCEDURE — 77334 RADIATION TREATMENT AID(S): CPT | Performed by: RADIOLOGY

## 2021-08-06 PROCEDURE — 77300 RADIATION THERAPY DOSE PLAN: CPT | Performed by: RADIOLOGY

## 2021-08-06 PROCEDURE — 77295 3-D RADIOTHERAPY PLAN: CPT | Performed by: RADIOLOGY

## 2021-08-07 ENCOUNTER — DOCUMENTATION (OUTPATIENT)
Dept: RADIATION ONCOLOGY | Facility: HOSPITAL | Age: 76
End: 2021-08-07

## 2021-08-07 NOTE — PROGRESS NOTES
This patient is low risk for an Oncotype DX and will not be receiving chemotherapy.  We will therefore proceed with postoperative adjuvant radiotherapy as previously discussed.

## 2021-08-11 ENCOUNTER — HOSPITAL ENCOUNTER (OUTPATIENT)
Dept: RADIATION ONCOLOGY | Facility: HOSPITAL | Age: 76
Setting detail: RADIATION/ONCOLOGY SERIES
Discharge: HOME | End: 2021-08-11

## 2021-08-11 PROCEDURE — 77280 THER RAD SIMULAJ FIELD SMPL: CPT | Performed by: RADIOLOGY

## 2021-08-11 PROCEDURE — 77412 RADIATION TX DELIVERY LVL 3: CPT | Performed by: RADIOLOGY

## 2021-08-12 ENCOUNTER — HOSPITAL ENCOUNTER (OUTPATIENT)
Dept: RADIATION ONCOLOGY | Facility: HOSPITAL | Age: 76
Setting detail: RADIATION/ONCOLOGY SERIES
Discharge: HOME | End: 2021-08-12

## 2021-08-12 ENCOUNTER — DOCUMENTATION (OUTPATIENT)
Dept: RADIATION ONCOLOGY | Facility: HOSPITAL | Age: 76
End: 2021-08-12

## 2021-08-12 PROCEDURE — 77412 RADIATION TX DELIVERY LVL 3: CPT | Performed by: RADIOLOGY

## 2021-08-12 PROCEDURE — 77417 THER RADIOLOGY PORT IMAGE(S): CPT | Performed by: RADIOLOGY

## 2021-08-12 NOTE — PROGRESS NOTES
On 8-11-21 CAMERON met with Mrs. Meek who started radiation treatment for malignant neoplasm of left breast. CAMERON introduced self and explained role and source of support. She is 76 years old and lives with her spouse. She has a strong support system including her spouse, [employer] family, friends, and family. She has two adult sons one lives in [place] and the other lives in [place]. 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 She is independent with her ADLs, plans to drive herself to treatment, and does not have any financial concerns. She does not take any medication for anxiety or depression. If she is feeling stressed she will garden and plant flowers. No needs noted. Mrs. Meek will contact this writer if assistance is needed in the future.

## 2021-08-13 ENCOUNTER — HOSPITAL ENCOUNTER (OUTPATIENT)
Dept: RADIATION ONCOLOGY | Facility: HOSPITAL | Age: 76
Setting detail: RADIATION/ONCOLOGY SERIES
Discharge: HOME | End: 2021-08-13

## 2021-08-13 PROCEDURE — 77412 RADIATION TX DELIVERY LVL 3: CPT | Performed by: RADIOLOGY

## 2021-08-13 PROCEDURE — 77336 RADIATION PHYSICS CONSULT: CPT | Performed by: RADIOLOGY

## 2021-08-16 ENCOUNTER — HOSPITAL ENCOUNTER (OUTPATIENT)
Dept: RADIATION ONCOLOGY | Facility: HOSPITAL | Age: 76
Setting detail: RADIATION/ONCOLOGY SERIES
Discharge: HOME | End: 2021-08-16

## 2021-08-16 PROCEDURE — 77412 RADIATION TX DELIVERY LVL 3: CPT | Performed by: RADIOLOGY

## 2021-08-17 ENCOUNTER — HOSPITAL ENCOUNTER (OUTPATIENT)
Dept: RADIATION ONCOLOGY | Facility: HOSPITAL | Age: 76
Setting detail: RADIATION/ONCOLOGY SERIES
Discharge: HOME | End: 2021-08-17

## 2021-08-17 PROCEDURE — 77412 RADIATION TX DELIVERY LVL 3: CPT | Performed by: RADIOLOGY

## 2021-08-18 ENCOUNTER — HOSPITAL ENCOUNTER (OUTPATIENT)
Dept: RADIATION ONCOLOGY | Facility: HOSPITAL | Age: 76
Setting detail: RADIATION/ONCOLOGY SERIES
Discharge: HOME | End: 2021-08-18

## 2021-08-18 PROCEDURE — 77412 RADIATION TX DELIVERY LVL 3: CPT | Performed by: RADIOLOGY

## 2021-08-19 ENCOUNTER — HOSPITAL ENCOUNTER (OUTPATIENT)
Dept: RADIATION ONCOLOGY | Facility: HOSPITAL | Age: 76
Setting detail: RADIATION/ONCOLOGY SERIES
Discharge: HOME | End: 2021-08-19

## 2021-08-19 PROCEDURE — 77412 RADIATION TX DELIVERY LVL 3: CPT | Performed by: RADIOLOGY

## 2021-08-20 ENCOUNTER — HOSPITAL ENCOUNTER (OUTPATIENT)
Dept: RADIATION ONCOLOGY | Facility: HOSPITAL | Age: 76
Setting detail: RADIATION/ONCOLOGY SERIES
Discharge: HOME | End: 2021-08-20

## 2021-08-20 PROCEDURE — 77336 RADIATION PHYSICS CONSULT: CPT | Performed by: RADIOLOGY

## 2021-08-20 PROCEDURE — 77412 RADIATION TX DELIVERY LVL 3: CPT | Performed by: RADIOLOGY

## 2021-08-23 ENCOUNTER — HOSPITAL ENCOUNTER (OUTPATIENT)
Dept: RADIATION ONCOLOGY | Facility: HOSPITAL | Age: 76
Setting detail: RADIATION/ONCOLOGY SERIES
Discharge: HOME | End: 2021-08-23

## 2021-08-23 PROCEDURE — 77412 RADIATION TX DELIVERY LVL 3: CPT | Performed by: RADIOLOGY

## 2021-08-24 ENCOUNTER — HOSPITAL ENCOUNTER (OUTPATIENT)
Dept: RADIATION ONCOLOGY | Facility: HOSPITAL | Age: 76
Setting detail: RADIATION/ONCOLOGY SERIES
Discharge: HOME | End: 2021-08-24

## 2021-08-24 PROCEDURE — 77412 RADIATION TX DELIVERY LVL 3: CPT | Performed by: RADIOLOGY

## 2021-08-25 ENCOUNTER — HOSPITAL ENCOUNTER (OUTPATIENT)
Dept: RADIATION ONCOLOGY | Facility: HOSPITAL | Age: 76
Setting detail: RADIATION/ONCOLOGY SERIES
Discharge: HOME | End: 2021-08-25

## 2021-08-25 PROCEDURE — 77412 RADIATION TX DELIVERY LVL 3: CPT | Performed by: RADIOLOGY

## 2021-08-26 ENCOUNTER — HOSPITAL ENCOUNTER (OUTPATIENT)
Dept: RADIATION ONCOLOGY | Facility: HOSPITAL | Age: 76
Setting detail: RADIATION/ONCOLOGY SERIES
Discharge: HOME | End: 2021-08-26

## 2021-08-26 PROCEDURE — 77412 RADIATION TX DELIVERY LVL 3: CPT | Performed by: RADIOLOGY

## 2021-08-26 PROCEDURE — 77417 THER RADIOLOGY PORT IMAGE(S): CPT | Performed by: RADIOLOGY

## 2021-08-27 ENCOUNTER — HOSPITAL ENCOUNTER (OUTPATIENT)
Dept: RADIATION ONCOLOGY | Facility: HOSPITAL | Age: 76
Setting detail: RADIATION/ONCOLOGY SERIES
Discharge: HOME | End: 2021-08-27

## 2021-08-27 PROCEDURE — 77412 RADIATION TX DELIVERY LVL 3: CPT | Performed by: RADIOLOGY

## 2021-08-27 PROCEDURE — 77336 RADIATION PHYSICS CONSULT: CPT | Performed by: RADIOLOGY

## 2021-08-30 ENCOUNTER — HOSPITAL ENCOUNTER (OUTPATIENT)
Dept: RADIATION ONCOLOGY | Facility: HOSPITAL | Age: 76
Setting detail: RADIATION/ONCOLOGY SERIES
Discharge: HOME | End: 2021-08-30

## 2021-08-30 ENCOUNTER — HOSPITAL ENCOUNTER (OUTPATIENT)
Dept: PHYSICAL THERAPY | Age: 76
Setting detail: THERAPIES SERIES
Discharge: HOME | End: 2021-08-30
Payer: MEDICARE

## 2021-08-30 DIAGNOSIS — I89.0 LYMPHEDEMA OF UPPER EXTREMITY: ICD-10-CM

## 2021-08-30 DIAGNOSIS — C50.912 INFILTRATING DUCTAL CARCINOMA OF BREAST, LEFT (HCC): Primary | ICD-10-CM

## 2021-08-30 DIAGNOSIS — Z98.890 HISTORY OF LYMPH NODE DISSECTION OF LEFT AXILLA: ICD-10-CM

## 2021-08-30 PROCEDURE — 77412 RADIATION TX DELIVERY LVL 3: CPT | Performed by: RADIOLOGY

## 2021-08-30 PROCEDURE — 97161 PT EVAL LOW COMPLEX 20 MIN: CPT

## 2021-08-30 NOTE — PROGRESS NOTES
Physical Therapy  Initial Assessment  Date: 2021  Patient Name: Denisse Serna  MRN: [id number]  : 1945     Treatment Diagnosis: LUE lymphedema    Subjective   General  Chart Reviewed: Yes  Patient assessed for rehabilitation services?: Yes  Additional Pertinent Hx: Lumpectomy on 21  Response To Previous Treatment: Not applicable  Family / Caregiver Present: No  Referring Practitioner: Rosendo Stuart MD  Referral Date : 21  Diagnosis: History of lymph node dissection of left axilla (Z02.474)  Follows Commands: Within Functional Limits  PT Visit Information  Onset Date: 21  PT Insurance Information: Medicare, mutual of [place] secondary no precert  Total # of Visits Approved: 2  Total # of Visits to Date: 1  Plan of Care/Certification Expiration Date: 21  Progress Note Due Date: 21  Subjective  Subjective: Ms. Omer Ledesma states she doesn't notice swelling in her arms. She finishes with radiation in 2 days.   Pain Screening  Patient Currently in Pain: Denies  Vital Signs  Patient Currently in Pain: Denies    Vision/Hearing  Vision  Vision: Within Functional Limits  Hearing  Hearing: Within functional limits    Orientation  Orientation  Overall Orientation Status: Within Normal Limits    Social/Functional History  Social/Functional History  Lives With: Spouse  Type of Home: House  Transfer Assistance: Independent  Active : Yes  Occupation: Retired  Leisure & Hobbies: Gardening    Objective     Observation/Palpation  Posture: Good  Edema: Edema noted BLEs    AROM RUE (degrees)  RUE AROM : WFL  AROM LUE (degrees)  LUE AROM : WFL    Strength RLE  Strength RLE: WFL  Strength LLE  Strength LLE: WFL  Strength RUE  Strength RUE: WFL  Strength LUE  Strength LUE: WFL     Additional Measures  Special Tests: L-Dex score: 11.5         LLIS  0% impairment  Other: Circumferential measurements from distal to proximal:   RUE:  19.4, 17.1, 20, 25, 27.7, 28, 30.5, 34.6, 36.3, 39.5         LUE: 19.4, 17.1, 20.5, 25.7, 28.9, 29, 32.7, 37, 37.8, 39.3        8/30 LUE: 18.7, 17.3, 20, 25.1, 28.2, 29.3, 31.5, 34.5, 37.3, 39.4  Sensation  Overall Sensation Status: WNL     Transfers  Sit to Stand: Independent  Stand to sit: Independent             Exercises  Exercise 1: Don/Doff sleeve  Exercise 2: Don/Doff sleeve independently  Exercise 3: Education on garment care  Exercise 4: Education on S/S to remove garment including redness, pain, itching, N/T                      Assessment   Conditions Requiring Skilled Therapeutic Intervention  Assessment: Ms Norma Anna presented today for fitting for compression sleeve s/p SOZO L Dex trigger. Patient with increased L Dex score compared with previous visit at 11.5 which further confirms need for compression. Demonstrated how to don and doff sleeve and Ms Norma Anna was able to don independently prior to end of session. Discussed at length reasons to remove sleeve including redness, numbness/tingling, pain, etc. Patient verbalized understanding and stated she would contact clinic if has questions. Patient scheduled to return in 3 weeks.   Treatment Diagnosis: LUE lymphedema  Prognosis: Good  Decision Making: Low Complexity  Treatment Initiated : Order compression garment         Plan   Plan  Plan Comment: Return to clinic in 4 weeks for SOZO trigger follow up after 4 weeks of compression    Goals  Short term goals  Time Frame for Short term goals: 1-2 weeks  Short term goal 1: Therapist will recommend appropriate UE garment to reduce chance of progressing with lymphedema - met  Short term goal 2: Patient will learn to don and doff garment safely - met  Long term goals  Time Frame for Long term goals : 4-6 weeks  Long term goal 1: Patient will wear UE compression garment 4 weeks x 12 hours per day as needed to reduce progression of lymphedema  Long term goal 2: Patient will verbalize signs and symptoms of lymphedema  Long term goal 3: Patient will verbalize long term care methods with lymphedema       Therapy Time   Individual Concurrent Group Co-treatment   Time In 1430         Time Out 1500         Minutes 30         Timed Code Treatment Minutes: 30 Minutes  Electronically signed by Tiffanie Jo PT on 8/30/2021 at 3:38 PM

## 2021-08-31 ENCOUNTER — HOSPITAL ENCOUNTER (OUTPATIENT)
Dept: RADIATION ONCOLOGY | Facility: HOSPITAL | Age: 76
Setting detail: RADIATION/ONCOLOGY SERIES
Discharge: HOME | End: 2021-08-31

## 2021-08-31 PROCEDURE — 77412 RADIATION TX DELIVERY LVL 3: CPT | Performed by: RADIOLOGY

## 2021-09-01 ENCOUNTER — HOSPITAL ENCOUNTER (OUTPATIENT)
Dept: RADIATION ONCOLOGY | Facility: HOSPITAL | Age: 76
Setting detail: RADIATION/ONCOLOGY SERIES
End: 2021-09-01

## 2021-09-01 ENCOUNTER — HOSPITAL ENCOUNTER (OUTPATIENT)
Dept: RADIATION ONCOLOGY | Facility: HOSPITAL | Age: 76
Setting detail: RADIATION/ONCOLOGY SERIES
Discharge: HOME | End: 2021-09-01

## 2021-09-01 ENCOUNTER — HOSPITAL ENCOUNTER (OUTPATIENT)
Dept: PHYSICAL THERAPY | Age: 76
Setting detail: THERAPIES SERIES
Discharge: HOME | End: 2021-09-01
Payer: MEDICARE

## 2021-09-01 PROCEDURE — 77412 RADIATION TX DELIVERY LVL 3: CPT | Performed by: RADIOLOGY

## 2021-09-01 PROCEDURE — 97530 THERAPEUTIC ACTIVITIES: CPT

## 2021-09-01 NOTE — PROGRESS NOTES
Physical Therapy  Daily Treatment Note  Date: 2021  Patient Name: Chepe Vazquez  MRN: [id number]     :   1945    Subjective:   General  Chart Reviewed: Yes  Additional Pertinent Hx: Lumpectomy on 21  Response To Previous Treatment: Not applicable  Family / Caregiver Present: No  Referring Practitioner: Deloris Donohue MD  PT Visit Information  Onset Date: 21  PT Insurance Information: Medicare, mutual of [place] secondary no precert  Total # of Visits Approved: 4  Total # of Visits to Date: 2  Plan of Care/Certification Expiration Date: 21  Progress Note Due Date: 21  Subjective  Subjective: Ms. Hank Acevedo states her sleeve is very tight and will not stay up. Pain Screening  Patient Currently in Pain: Denies  Vital Signs  Patient Currently in Pain: Denies       Treatment Activities:                                    Exercises  Exercise 1: Don/Doff sleeve  Exercise 2: Don/Doff sleeve independently  Exercise 3: Education on garment care  Exercise 4: Education on S/S to remove garment including redness, pain, itching, N/T  Exercise 5: Fitting of sleeve                               Assessment:   Conditions Requiring Skilled Therapeutic Intervention  Assessment: Ms Hank Acevedo presented today with sleeve that was not able to remain pulled up on her arm. Slight redness noted on upper arm. Patient waited 5 weeks after measuring for sleeve size due to hold on account with garment supplier. Patient did increase with swelling in that time and that is likely why the sleeve was somewhat small. Patient agreed and will return to clinic when sleeve has come.   Treatment Diagnosis: LUE lymphedema  Prognosis: Good  Decision Making: Low Complexity  Treatment Initiated : Order compression garment    Goals:  Short term goals  Time Frame for Short term goals: 1-2 weeks  Short term goal 1: Therapist will recommend appropriate UE garment to reduce chance of progressing with lymphedema - met  Short term goal 2: Patient will learn to don and doff garment safely - met  Long term goals  Time Frame for Long term goals : 4-6 weeks  Long term goal 1: Patient will wear UE compression garment 4 weeks x 12 hours per day as needed to reduce progression of lymphedema  Long term goal 2: Patient will verbalize signs and symptoms of lymphedema  Long term goal 3: Patient will verbalize long term care methods with lymphedema  Plan:    Plan  Plan Comment: Return to clinic when sleeve arrives  Timed Code Treatment Minutes: 15 Minutes     Therapy Time   Individual Concurrent Group Co-treatment   Time In 1200         Time Out 1215         Minutes 15         Timed Code Treatment Minutes: 15 Minutes  Electronically signed by Aleksandra Gusman PT on 9/1/2021 at 5:05 PM

## 2021-09-14 RX ORDER — ALLOPURINOL 100 MG/1
TABLET ORAL
Qty: 180 TABLET | Refills: 0 | Status: SHIPPED | OUTPATIENT
Start: 2021-09-14 | End: 2021-12-14

## 2021-09-15 ENCOUNTER — HOSPITAL ENCOUNTER (OUTPATIENT)
Dept: PHYSICAL THERAPY | Age: 76
Setting detail: THERAPIES SERIES
End: 2021-09-15
Payer: MEDICARE

## 2021-09-15 ENCOUNTER — OFFICE VISIT (OUTPATIENT)
Dept: SURGERY | Age: 76
End: 2021-09-15
Payer: MEDICARE

## 2021-09-15 VITALS — DIASTOLIC BLOOD PRESSURE: 84 MMHG | HEART RATE: 80 BPM | SYSTOLIC BLOOD PRESSURE: 132 MMHG

## 2021-09-15 DIAGNOSIS — C50.512 MALIGNANT NEOPLASM OF LOWER-OUTER QUADRANT OF LEFT BREAST OF FEMALE, ESTROGEN RECEPTOR POSITIVE (HCC): Primary | ICD-10-CM

## 2021-09-15 DIAGNOSIS — Z98.890 STATUS POST LEFT BREAST LUMPECTOMY: ICD-10-CM

## 2021-09-15 DIAGNOSIS — Z17.0 MALIGNANT NEOPLASM OF LOWER-OUTER QUADRANT OF LEFT BREAST OF FEMALE, ESTROGEN RECEPTOR POSITIVE (HCC): Primary | ICD-10-CM

## 2021-09-15 PROCEDURE — 1036F TOBACCO NON-USER: CPT | Performed by: SURGERY

## 2021-09-15 PROCEDURE — G8417 CALC BMI ABV UP PARAM F/U: HCPCS | Performed by: SURGERY

## 2021-09-15 PROCEDURE — G8427 DOCREV CUR MEDS BY ELIG CLIN: HCPCS | Performed by: SURGERY

## 2021-09-15 PROCEDURE — 4040F PNEUMOC VAC/ADMIN/RCVD: CPT | Performed by: SURGERY

## 2021-09-15 PROCEDURE — 1123F ACP DISCUSS/DSCN MKR DOCD: CPT | Performed by: SURGERY

## 2021-09-15 PROCEDURE — 99213 OFFICE O/P EST LOW 20 MIN: CPT | Performed by: SURGERY

## 2021-09-15 PROCEDURE — G8400 PT W/DXA NO RESULTS DOC: HCPCS | Performed by: SURGERY

## 2021-09-15 PROCEDURE — 1090F PRES/ABSN URINE INCON ASSESS: CPT | Performed by: SURGERY

## 2021-09-15 PROCEDURE — 3017F COLORECTAL CA SCREEN DOC REV: CPT | Performed by: SURGERY

## 2021-09-27 ENCOUNTER — APPOINTMENT (OUTPATIENT)
Dept: PHYSICAL THERAPY | Age: 76
End: 2021-09-27
Payer: MEDICARE

## 2021-10-11 PROBLEM — Z92.3 HISTORY OF RADIATION THERAPY: Status: ACTIVE | Noted: 2021-10-11

## 2021-10-12 NOTE — PROGRESS NOTES
[facility], .SJeronimoJeronimo Mak MD      CAROLINE Renae  _______________________________________________  [facility]  Department of Radiation Oncology  [address] [place] [id number]  Office: [phone]  Fax: [phone]    DATE:  10/13/2021  PATIENT: Charity Meek  1945                         MEDICAL RECORD #:  [id number]                                                       Reason for Visit:   Chief Complaint   Patient presents with   • Breast Cancer     Charity Meek is a very pleasant 76 y.o. patient that has completed radiation therapy for carcinoma of the breast and returns to the clinic today for inital follow up exam. Continues on Femara with hot flashes reported. Denies activity change, appetite change, unexpected weight change, nausea/vomiting, diarrhea, light-headedness, weakness, and headaches. She continues to follow  and .     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 Underwent lumpectomy and SNB on 06/04/2021, pathology revealed lesion 0.4 cm from nearest margin, focal low-grade DCIS is identified in conjunction with invasive lesion, 1.0 cm from margins, ER/WA+. (0/8) LN negative for malignancy. She completed 4256 cGy in 16 fractions to the left breast on 09/01/2021.     04/15/2021 - Bilateral screening mammogram ([facility]):   • A stellate 1.2cm mass identified centrally and inferiorly in the left breast. It is a new finding.  •  A left diagnostic mammogram was performed consisting of spot compression views. This confirms the presence of a stellate mass in the left breast.   • A targeted, high resolution left breast ultrasound demonstrates intense acoustic shadowing t the 6 o'clock position in the left breast from the 1.2 mass. Mammographic appearance of the right breast is stable.   • BI-RADS category 4-suspicious for  malignancy. Further evaluation is needed.    04/15/2021 - US left breast including axilla ([facility]):   • There is a focal area of acoustic shadowing identified at the 6 o'clock position in the left breast corresponding to the stellate mass seen on mammogram. There is so much shadowing it is difficult to tell what is mass versus acoustic shadowing. My best estimate of diameter is 1.2cm. Color Doppler demonstrates little to no blood flow to the mass. This is highly suspicious appearing.   • BI-RADS category 4, suspicious. Percutaneous biopsy is recommended.    04/26/2021 - Breast, left breast needle core biopsies at 5 o'clock position:   • Infiltrating ductal carcinoma, no special type, favor grade 1.   • Infiltrating carcinoma measures 0.8 cm in greatest linear dimension and is present in multiple cores.   • ER 70%, WI 77%, Her-2 sterling-negative, Ki67 8%-low. Cytology    04/26/2021 - Lymph node, left axillary lymph node fine-needle aspiration, smears and ThinPrep:   • Polytypic small round lymphocytes, negative for evidence of metastasis.     05/10/2021 - MRI Breast bilateral with and without contrast:  • Amount of Fibroglandular Tissue: Scattered fibroglandular tissue.   • Background Parenchymal Enhancement:  Minimal.   • Left breast with a 2.4 x 3.5 cm irregular mass with irregular and angular margins in the middle third, slightly lower outer breast, 5:00 position, 7 cm from the nipple on axial images. Adjacent biopsy marker. No additional suspicious finding in the left breast.   • Right breast with a 0.9 cm focus of enhancement in the middle third, slightly upper outer breast, 11:00 position, 5.4 cm from the nipple on sagittal images.   • No suspicious axillary or internal mammary adenopathy.   • The visualized portion of the chest and abdomen appear within normal limits considering technique.  Impression:  • Right breast with a 0.9 cm focus of enhancement as described above. Recommend second look ultrasound.    • Redemonstration of known left breast cancer, with a greater size of enhancement than appreciated by ultrasound.   • BI-RADS CATEGORY 0: NEED ADDITIONAL EVALUATION AND/OR PRIOR MAMMOGRAMS FOR COMPARISON.   • Management Recommendation: Recall for additional imaging.     05/17/2021 - US breast limited right:  • Impression: Suspicious right breast nodule in patient with known left breast cancer   • Recommendations: Ultrasound-guided biopsy prior to definitive surgical therapy.     06/02/2021 - Breast, right breast needle core biopsies at 12 o'clock position:   • Fibroadenoma with focal involvement by atypical ductal hyperplasia.     06/04/2021 - Right breast excisional biopsy, left breast lumpectomy and left sentinel lymph node biopsies per :  • Breast, right breast excisional biopsy:   o Fibroadenoma with focal involvement by atypical ductal hyperplasia.   o Microfocus of atypical ductal hyperplasia identified separately from the fibroadenoma, margins negative.   • Breast, left lumpectomy:   o Invasive ductal carcinoma, no special type, grade 1.   o Invasive carcinoma measures 1.5 cm in greatest dimension microscopically.   o Invasive carcinoma is located 0.4 cm from the nearest medial surgical excision margin.   o Focal low-grade ductal carcinoma in situ is identified in conjunction with the invasive lesion.   o In situ carcinoma is located greater than 1.0 cm from all evaluated margins.   o Prior biopsy site identified.   o Sections of skin, negative for evidence of malignancy.   • Breast, excision of additional left breast medial margin:   o Focal atypical ductal hyperplasia, margins negative.   o Negative for evidence of residual in situ or invasive carcinoma.   • Breast, excision of additional left breast deep margin:   o Benign adipose tissue and skeletal muscle.   • Lymph nodes, left sentinel lymph node biopsies:   o 8 lymph nodes, negative for evidence of malignancy.   • AJCC STAGE: pT1c, pN0, pMx    INVASIVE CARCINOMA OF THE BREAST:   • Procedure: Excision with wire-guided localization   • Lymph Node Sampling: [place] lymph node(s)   • Specimen Laterality:  Left   • Tumor Site: Invasive Carcinoma:  Not specified   TUMOR:   • Ductal Carcinoma In Situ (DCIS): DCIS is present.   • Negative for extensive intraductal component (EIC).   • Architectural Patterns:  Cribriform   • Nuclear Grade:  Grade I (low).   • Necrosis:  Not identified   • Lobular Carcinoma In Situ (LCIS):  Not identified   • Presence of Invasive Carcinoma:   • Histologic Type of Invasive Carcinoma:  Invasive ductal carcinoma (no special type or not otherwise specified).   • Histologic Grade: Gavin Histologic Score: Glandular (Acinar) / Tubular Differentiation:  Score 1: > 75% of tumor   area forming glandular / tubular structures.   • Nuclear Pleomorphism:  Score 1: Nuclei small with little increase in size in comparison with normal breast epithelial cells, regular outlines,   uniform nuclear chromatin, little variation in size.   • Mitotic Rate:   Score 1 (<=3 mitoses per mm2). Number of Mitoses per 10   • High-Power Fields:  2   • Overall Grade:  Grade 1: scores of 3, 4 or 5.   EXTENT:   • Tumor Size / Focality:   • Tumor Size: Size of Largest Invasive Carcinoma:   Greatest dimension of largest focus of invasion > 0.1 c:  1.5 cm   • Tumor Focality : Single focus of invasive carcinoma   • Macroscopic and Microscopic Extent of Tumor:   • Skin Invasion:  Invasive carcinoma does not invade into the dermis or epidermis.   • Satellite foci not identified   • Nipple DCIS:  Not applicable   • Skeletal Muscle:  Skeletal muscle is free of carcinoma.   MARGINS:   • Margins uninvolved by invasive carcinoma.   • Distance from Closest Margin: Specify:  0.4 cm   • Closest Uninvolved Margin: Medial   • Ductal Carcinoma In Situ (DCIS) Margins uninvolved by DCIS (DCIS present)   • Distance of DCIS from Closest Margin: Specify:  >1.0 cm   • Closest  Uninvolved Margin:  Medial   ACCESSORY FINDINGS:   • Lymph-Vascular Invasion:  Not identified   • Dermal Lymph-Vascular Invasion:  Not identified   • Microcalcifications:  Not identified   • Treatment Effect: Response to Presurgical (Neoadjuvant) Therapy:  No known presurgical therapy   LYMPH NODES:   • [place] and Non-Danbury Nodes:   o Total Number of Lymph Nodes Examined (sentinel and nonsentinel):  8.   o Number of Lymph Nodes without Tumor Cells Identified:  8 .   o Number of Lymph Nodes with Isolated Tumor Cells (<= 0.2 mm and <= 200 cells):  0 .   • Micro / Macro Metastases: Not identified   • [place] Nodes:   o Number of [place] Lymph Nodes Examined:  8.   o Method of Evaluation of [place] Lymph Nodes:   H&E, multiple levels   SPECIAL STUDIES:   PATHOLOGIC STAGING:   • TNM Descriptors: Not applicable   • Primary Tumor (Invasive Carcinoma) (pT):  pT1c:  Tumor > 10 mm but <= 20 mm in greatest dimension   • Regional Lymph Nodes (pN):  Category (pN):  pN0: No regional lymph node metastasis identified histologically   • Distant Metastasis (pM):  Not applicable     06/10/2021 - Appointment with :  • See Orville in one week to remove drain if ready.   • Refer to Dr. Lino to see in 3 weeks.   • I will see her back in the office in one month.   • She will call with any new concerns.      07/02/2021 - Appointment with :  • IDC left breast, Grade 1 ER 70%, ID 77%, HER-2 negative, pT1 cN0 M0, MammaPrint low risk luminal type a  • The patient was counseled today about diagnosis, staging, prognosis, diagnostic tests, medications, side effects and disease management.   • Essentially, stage I breast cancer   • Recommended Oncotype DX regarding adjuvant chemotherapy.  • Referral to radiation oncology.  • Switch from tamoxifen to Femara.  • RTC 4 weeks to discuss results of Oncotype DX.  PLAN:  • Continue follow-up with Dr. Haji  • Refer to Dr. Mak for radiation therapy evaluation  • Recommend  Oncotype DX testing on pathology  • Recommend switching to Letrozole 2.5mg daily-script sent  • Refer to Lymphedema clinic  • Bone density before next visit  • RTC with MD 4 weeks     07/15/2021 - Consult with :  • We have discussed the role of radiation therapy with this diagnosis as well as the indications and rationale of adjuvant radiation therapy according to the NCCN Guidelines. She has verbalized understanding of our conversation and agrees to the treatment recommendations for postoperative radiation therapy to the left breast following Lumpectomy   • I anticipate a dose of 4256 cGy over 16 treatment fractions. We will simulate treatment fields to begin the treatment planning, final dose to be determined.  Plan:  • Plan on 16 treatments M-F for about 30 minutes daily  • Side effects include sunburn and fatigue.  • We will call you when to begin treatments pending your Oncotype results in about 3-4 weeks.     07/30/2021 - Appointment with :  • IDC left breast, Grade 1 ER 70%, UT 77%, HER-2 negative, pT1 cN0 M0, MammaPrint low risk luminal type a  • Essentially, stage I breast cancer   • Switched from tamoxifen to Femara for at least 5 years.  • Oncotype DX recurrence score: 6  • Continue treatment radiation  • Oncotype DX results discussed. No need for adjuvant chemotherapy. Continue radiation. Continue letrozole after radiation as adjuvant treatment for 5 years. Bone density reviewed. Osteopenia.  • Continue calcium vitamin D.  PLAN:  • Continue follow-up with Dr. Haji  • Continue follow-up with Dr. Mak for radiation therapy evaluation  • Continue Letrozole 2.5mg daily  • Continue follow-up with Lymphedema clinic  • RTC with MD 4 months  • Repeat BMD July 2023 08/11/2021 - 09/01/2021 - Completed radiation course:  • Received 4256 cGy in 16 fractions to the left breast.    09/15/2021 - Appointment with :  • I will see her back in three months for exam and with a unilateral  left mammogram. She will call us with any new concerns.     History obtained from  PATIENT and CHART    PAST MEDICAL HISTORY  Past Medical History:   Diagnosis Date   • DJD (degenerative joint disease)    • HTN (hypertension)    • Hyperlipemia       PAST SURGICAL HISTORY  Past Surgical History:   Procedure Laterality Date   • BREAST BIOPSY     • BREAST LUMPECTOMY Left 06/04/2021    [facility]   • COLONOSCOPY  06/18/2012   • COLONOSCOPY N/A 8/30/2017    Procedure: COLONOSCOPY WITH ANESTHESIA;  Surgeon: Viky Hu MD;  [facility] ENDOSCOPY;  Service:    • HYSTERECTOMY     • REPLACEMENT TOTAL KNEE Bilateral       FAMILY HISTORY  family history includes Cancer in her sister; Colon polyps in her sister.    SOCIAL HISTORY  Social History     Tobacco Use   • Smoking status: Never Smoker   • Smokeless tobacco: Never Used   Substance Use Topics   • Alcohol use: No   • Drug use: No      ALLERGIES  Patient has no known allergies.     MEDICATIONS  Current Outpatient Medications   Medication Sig Dispense Refill   • allopurinol (ZYLOPRIM) 100 MG tablet Take 2 tablets by mouth Daily.     • aspirin (Tayo Aspirin EC Low Dose) 81 MG EC tablet Take 1 tablet by mouth Daily.     • Calcium Carbonate-Vit D-Min (Calcium 1200) 8351-5567 MG-UNIT chewable tablet Chew 1 tablet Daily.     • letrozole (FEMARA) 2.5 MG tablet Take 2.5 mg by mouth Daily.     • lisinopril (PRINIVIL,ZESTRIL) 40 MG tablet Take 40 mg by mouth Daily.     • lovastatin (MEVACOR) 40 MG tablet Take 40 mg by mouth Every Night.     • metoprolol tartrate (LOPRESSOR) 25 MG tablet Take 1 tablet by mouth Daily.     • montelukast (SINGULAIR) 10 MG tablet Take 1 tablet by mouth Daily.     • multivitamin with minerals tablet tablet Take 1 tablet by mouth Daily.     • Omega-3 Fatty Acids (FISH OIL) 1000 MG capsule capsule Take  by mouth.     • Vitamin D, Cholecalciferol, (CHOLECALCIFEROL) 400 UNITS tablet Take 400 Units by mouth Daily.     • hydrochlorothiazide (HYDRODIURIL) 25  "MG tablet      • lisinopril (PRINIVIL,ZESTRIL) 30 MG tablet        No current facility-administered medications for this visit.      The following portions of the patient's history were reviewed and updated as appropriate: allergies, current medications, past family history, past medical history, past social history, past surgical history and problem list.    REVIEW OF SYSTEMS  Review of Systems   Constitutional: Negative.    HENT: Negative.    Eyes: Negative.    Respiratory: Negative.    Cardiovascular: Negative.         Ankles swell   Gastrointestinal: Negative.    Endocrine: Negative.         Letrozole  Has hot flashes   Genitourinary: Negative.    Musculoskeletal: Negative.         Gout  Arthritis   Skin: Negative.    Allergic/Immunologic: Negative.    Neurological: Negative.    Hematological: Negative.    Psychiatric/Behavioral: Negative.      I have reviewed and confirmed the accuracy of the ROS as documented by the MA/LPN/JENNIFER Navarrete MD    PHYSICAL EXAM  VITAL SIGNS:   Vitals:    10/13/21 1317   BP: 144/84   Pulse: 84   Resp: 18   SpO2: 97%   Weight: 89.4 kg (197 lb)   Height: 152.4 cm (60\")   PainSc: 0-No pain      Physical Exam  General:  Alert and oriented, in no acute distress, well-developed, vitals reviewed.  Head:  Normocephalic, without obvious abnormality    Nose/Sinuses:  Nares normal externally  Mouth/Throat:  Mucosa moist, without erythema  Neck:  supple, No evidence of adenopathy in the cervical or supraclavicular areas.  Eyes: No gross abnormalities   Ears: Ears intact with no external abnormalities noted  Chest:  Respiratory efforts are normal and unlabored, chest is clear to auscultation.  Breast: Left breast s/p lumpectomy and radiation therapy, surgical scar well healed, slight hyperpigmentation noted, otherwise within normal limits, no lymphadenopathy noted. right breast without nodules or masses, no skin lesions noted, no palpable lymphadenopathy.  Cardiovascular: Regular rate " and rhythm without murmurs, rubs, or gallops.   Abdomen:  Soft, non-tender, normal bowel sounds;   Extremities:  MCKENZIE well, warm to touch, no cyanosis or edema.  Skin: No suspicious lesions or rashes of concern  Neurologic: non focal exam, strength and sensation grossly normal  Psych: Mood and affect are appropriate  General Appearance:  awake, alert, oriented, in no acute distress.    Performance Status: ECOG (0) Fully active, able to carry on all predisease performance without restriction    Clinical Quality Measures  -Pain Documented by Standardized Tool, FPS Charity Meek reports a pain score of 0. Given her pain assessment as noted, treatment options were discussed and the following options were decided upon as a follow-up plan to address the patient's pain: No pain, no plan given.     Pain Medications             aspirin (Tayo Aspirin EC Low Dose) 81 MG EC tablet Take 1 tablet by mouth Daily.        Body Mass Index Screening and Follow-Up Plan  Patient's Body mass index is 38.47 kg/m². indicating that she is obese (BMI >30). Obesity-related health conditions include the following: none.     Tobacco Use: Screening and Cessation Intervention  Social History    Tobacco Use      Smoking status: Never Smoker      Smokeless tobacco: Never Used    Advanced Care Planning Advance Care Planning    ACP discussion was held with the patient during this visit. Patient does not have an advance directive, information provided.    ASSESSMENT AND PLAN  1. Malignant neoplasm of left breast in female, estrogen receptor positive, unspecified site of breast (HCC)    2. S/P lumpectomy, left breast    3. S/P lymph node biopsy    4. History of radiation therapy    5. Non-smoker      Recommendations:  Charity Meek is status post completion of adjuvant radiation therapy to the left breast and presents to our clinic today for follow up exam. Diagnosed in June 2021 with Stage IA (T1c, N0, cM0, G1) Infiltrating Ductal Carcinoma of  the left breast, 1.5 cm. Underwent lumpectomy and SNB on 06/04/2021, pathology revealed lesion 0.4 cm from nearest margin, focal low-grade DCIS is identified in conjunction with invasive lesion, 1.0 cm from margins, ER/DC+. (0/8) LN negative for malignancy. Follows Dr. Lino, referred to radiation therapy. She completed 4256 cGy in 16 fractions to the left breast on 09/01/2021.     We discussed expected post radiation long and short term effects, monthly self exams and NCCN surveillance recommendations. She is doing well and has no evidence of recurrent or metastatic disease today. Unilateral left breast mammogram scheduled per . Continue ongoing management with other physicians, will follow up with us in 1 year or sooner if needed.    Time Spent: I spent 30 minutes caring for Charity on this date of service. This time includes time spent by me in the following activities: preparing for the visit, reviewing tests, obtaining and/or reviewing a separately obtained history, performing a medically appropriate examination and/or evaluation, counseling and educating the patient/family/caregiver, ordering medications, tests, or procedures, referring and communicating with other health care professionals, documenting information in the medical record and independently interpreting results and communicating that information with the patient/family/caregiver.  I saw this patient in follow-up while covering for Dr. Ash Mak, radiation oncologist.  Joseph Navarrete MD  10/13/2021

## 2021-10-13 ENCOUNTER — HOSPITAL ENCOUNTER (OUTPATIENT)
Dept: PHYSICAL THERAPY | Age: 76
Setting detail: THERAPIES SERIES
Discharge: HOME | End: 2021-10-13
Payer: MEDICARE

## 2021-10-13 ENCOUNTER — HOSPITAL ENCOUNTER (OUTPATIENT)
Dept: RADIATION ONCOLOGY | Facility: HOSPITAL | Age: 76
Setting detail: RADIATION/ONCOLOGY SERIES
End: 2021-10-13

## 2021-10-13 ENCOUNTER — OFFICE VISIT (OUTPATIENT)
Dept: RADIATION ONCOLOGY | Facility: HOSPITAL | Age: 76
End: 2021-10-13

## 2021-10-13 VITALS
BODY MASS INDEX: 38.68 KG/M2 | DIASTOLIC BLOOD PRESSURE: 84 MMHG | RESPIRATION RATE: 18 BRPM | OXYGEN SATURATION: 97 % | SYSTOLIC BLOOD PRESSURE: 144 MMHG | HEIGHT: 60 IN | HEART RATE: 84 BPM | WEIGHT: 197 LBS

## 2021-10-13 DIAGNOSIS — C50.912 MALIGNANT NEOPLASM OF LEFT BREAST IN FEMALE, ESTROGEN RECEPTOR POSITIVE, UNSPECIFIED SITE OF BREAST (HCC): Primary | ICD-10-CM

## 2021-10-13 DIAGNOSIS — Z17.0 MALIGNANT NEOPLASM OF LEFT BREAST IN FEMALE, ESTROGEN RECEPTOR POSITIVE, UNSPECIFIED SITE OF BREAST (HCC): Primary | ICD-10-CM

## 2021-10-13 DIAGNOSIS — Z98.890 S/P LUMPECTOMY, LEFT BREAST: ICD-10-CM

## 2021-10-13 DIAGNOSIS — I89.0 LYMPHEDEMA OF UPPER EXTREMITY: ICD-10-CM

## 2021-10-13 DIAGNOSIS — Z78.9 NON-SMOKER: ICD-10-CM

## 2021-10-13 DIAGNOSIS — Z92.3 HISTORY OF RADIATION THERAPY: ICD-10-CM

## 2021-10-13 DIAGNOSIS — C50.912 INFILTRATING DUCTAL CARCINOMA OF BREAST, LEFT (HCC): Primary | ICD-10-CM

## 2021-10-13 DIAGNOSIS — Z98.890 S/P LYMPH NODE BIOPSY: ICD-10-CM

## 2021-10-13 PROCEDURE — 97161 PT EVAL LOW COMPLEX 20 MIN: CPT

## 2021-10-13 PROCEDURE — G0463 HOSPITAL OUTPT CLINIC VISIT: HCPCS | Performed by: RADIOLOGY

## 2021-10-13 RX ORDER — ASPIRIN 81 MG/1
1 TABLET ORAL DAILY
COMMUNITY

## 2021-10-13 RX ORDER — MONTELUKAST SODIUM 10 MG/1
1 TABLET ORAL DAILY
COMMUNITY
Start: 2021-10-12

## 2021-10-13 RX ORDER — LETROZOLE 2.5 MG/1
2.5 TABLET, FILM COATED ORAL DAILY
COMMUNITY
Start: 2021-07-30

## 2021-10-13 RX ORDER — LISINOPRIL 40 MG/1
40 TABLET ORAL DAILY
COMMUNITY
Start: 2021-08-23 | End: 2022-09-22 | Stop reason: SDUPTHER

## 2021-10-13 RX ORDER — MULTIPLE VITAMINS W/ MINERALS TAB 9MG-400MCG
1 TAB ORAL DAILY
COMMUNITY

## 2021-10-13 RX ORDER — ACETAMINOPHEN 500 MG
1 TABLET ORAL DAILY
COMMUNITY

## 2021-10-13 RX ORDER — ALLOPURINOL 100 MG/1
2 TABLET ORAL DAILY
COMMUNITY
Start: 2021-09-14

## 2021-10-13 NOTE — PROGRESS NOTES
Physical Therapy  Initial Assessment/Discharge  Date: 10/13/2021  Patient Name: Violeta Beck  MRN: [id number]  : 1945     Treatment Diagnosis: LUE lymphedema      Subjective   General  Chart Reviewed: Yes  Patient assessed for rehabilitation services?: Yes  Additional Pertinent Hx: Lumpectomy on 21  Response To Previous Treatment: Not applicable  Family / Caregiver Present: No  Referring Practitioner: Angeles Lawrence MD  Referral Date : 10/13/21  Diagnosis: History of lymph node dissection of left axilla (K98.656)  Follows Commands: Within Functional Limits  PT Visit Information  Onset Date: 21  PT Insurance Information: Medicare, mutual of [employer] secondary no precert  Total # of Visits Approved: 1  Total # of Visits to Date: 1  Subjective  Subjective: Ms Orquidea Massey reports her new sleeve works well and doesn't give her any trouble. She doesn't notice it sliding down throughout the day.   Pain Screening  Patient Currently in Pain: Denies  Vital Signs  Patient Currently in Pain: Denies    Vision/Hearing  Vision  Vision: Within Functional Limits  Hearing  Hearing: Within functional limits    Orientation  Orientation  Overall Orientation Status: Within Normal Limits    Social/Functional History  Social/Functional History  Lives With: Spouse  Type of Home: House  Transfer Assistance: Independent  Active : Yes  Occupation: Retired  Leisure & Hobbies: Gardening, cooking    Objective     Observation/Palpation  Posture: Good  Edema: Edema noted BLEs - discussed this with patient and she reports she wears compression stocking when able    AROM RUE (degrees)  RUE AROM : WFL  AROM LUE (degrees)  LUE AROM : WFL    Strength RLE  Strength RLE: WFL  Strength LLE  Strength LLE: WFL  Strength RUE  Strength RUE: WFL  Strength LUE  Strength LUE: WFL     Additional Measures  Special Tests: L-Dex score: 8.1  Other: Circumferential measurements from distal to proximal:           LUE: 18.6, 17, 20.4, 24.5, 26.7, 27.9, 28.9, 31, 34.4, 36  Sensation  Overall Sensation Status: WNL                   Exercises  Exercise 1: Don/Doff sleeve  Exercise 2: Don/Doff sleeve independently  Exercise 4: Education on S/S to remove garment including redness, pain, itching, N/T  Exercise 5: Fitting of sleeve                      Assessment   Conditions Requiring Skilled Therapeutic Intervention  Assessment: Ms Williams Gloria presented today after wearing compression for 4 weeks. Her circumferential measurements have decreased as well as her LDex score showing that total swelling in L arm has decreased. Ms Williams Gloria was able to don and doff her garment correctly with no concerns. We will follow up for monitoring in 3 months to make sure decreased swelling is maintained. Patient in agreement with this plan.   Treatment Diagnosis: LUE lymphedema  Prognosis: Good  Decision Making: Low Complexity         Plan   Plan  Plan Comment: Follow up in 3 months    Goals  Short term goals  Time Frame for Short term goals: 1-2 weeks  Short term goal 1: Therapist will recommend appropriate UE garment to reduce chance of progressing with lymphedema - met  Short term goal 2: Patient will learn to don and doff garment safely - met  Long term goals  Time Frame for Long term goals : 4-6 weeks  Long term goal 1: Patient will wear UE compression garment 4 weeks x 12 hours per day as needed to reduce progression of lymphedema   - met (10/13 patient wearing compression garment 12 hours per day consistently)  Long term goal 2: Patient will verbalize signs and symptoms of lymphedema - met  Long term goal 3: Patient will verbalize long term care methods with lymphedema - met       Therapy Time   Individual Concurrent Group Co-treatment   Time In 1510         Time Out 1530         Minutes 20         Timed Code Treatment Minutes: 20 Minutes  Electronically signed by Andria Tavera PT on 10/13/2021 at 4:00 PM

## 2021-10-13 NOTE — PROGRESS NOTES
[facility]  OUTPATIENT PHYSICAL THERAPY  DISCHARGE SUMMARY    Date: 10/13/2021  Patient Name: Dustin Robert        MRN: [id number]    ACCOUNT #: [de-identified]  : 1945  (77 y.o.)  Gender: female   Referring Practitioner: Ivelisse Haley MD  Diagnosis: History of lymph node dissection of left axilla (U38.953)  Referral Date : 21  Treatment Diagnosis: LUE lymphedema    Total # of Visits Approved: 4  Total # of Visits to Date: 2    Subjective  Subjective: Ms. Adonis Broussard states her sleeve is very tight and will not stay up. Additional Pertinent Hx: Lumpectomy on 21    Objective  Treatments received include: compression treatment, education on lymphedema  See objective/subjective data in goals    Assessment  Assessment: Ms Adonis Broussard presented today with sleeve that was not able to remain pulled up on her arm. Slight redness noted on upper arm. Patient waited 5 weeks after measuring for sleeve size due to hold on account with garment supplier. Patient did increase with swelling in that time and that is likely why the sleeve was somewhat small. Patient agreed and will return to clinic when sleeve has come. Plan     Ms Adonis Broussard is discharged to home with compression sleeve to return to clinic after 4-6 weeks of consistent compression wear.        Goals  Short term goals  Time Frame for Short term goals: 1-2 weeks  Short term goal 1: Therapist will recommend appropriate UE garment to reduce chance of progressing with lymphedema - met  Short term goal 2: Patient will learn to don and doff garment safely - met    Long term goals  Time Frame for Long term goals : 4-6 weeks  Long term goal 1: Patient will wear UE compression garment 4 weeks x 12 hours per day as needed to reduce progression of lymphedema  Long term goal 2: Patient will verbalize signs and symptoms of lymphedema  Long term goal 3: Patient will verbalize long term care methods with lymphedema         Electronically signed by Josafat Foreman Eddie, PT on 10/13/2021 at 3:46 PM

## 2021-10-27 DIAGNOSIS — C50.912 INFILTRATING DUCTAL CARCINOMA OF BREAST, LEFT (HCC): ICD-10-CM

## 2021-10-28 RX ORDER — LETROZOLE 2.5 MG/1
TABLET, FILM COATED ORAL
Qty: 90 TABLET | Refills: 1 | Status: SHIPPED | OUTPATIENT
Start: 2021-10-28 | End: 2021-11-01

## 2021-11-01 DIAGNOSIS — C50.912 INFILTRATING DUCTAL CARCINOMA OF BREAST, LEFT (HCC): ICD-10-CM

## 2021-11-01 RX ORDER — LETROZOLE 2.5 MG/1
TABLET, FILM COATED ORAL
Qty: 90 TABLET | Refills: 0 | Status: SHIPPED | OUTPATIENT
Start: 2021-11-01 | End: 2021-11-30 | Stop reason: SDUPTHER

## 2021-11-15 RX ORDER — MONTELUKAST SODIUM 10 MG/1
10 TABLET ORAL DAILY
Qty: 90 TABLET | Refills: 3 | Status: SHIPPED | OUTPATIENT
Start: 2021-11-15

## 2021-11-29 NOTE — PROGRESS NOTES
MEDICAL ONCOLOGY PROGRESS NOTE    Pt Name: Jaz Layne  MRN: [id number]  YOB: 1945  Date of evaluation: 11/30/2021    HISTORY OF PRESENT ILLNESS:    The patient is a very pleasant 77years old female who is currently on adjuvant endocrine therapy with letrozole. She is a status post left lumpectomy/sentinel lymph node biopsy. She completed adjuvant treatment with radiation on 9/1/2021. She is currently on adjuvant letrozole. She has been tolerating treatment well except for mild arthralgia. Diagnosis  · Infiltrating ductal carcinoma, left breast April 2021  · Favor grade 1  · ER 70%, TN 77%, Her-2 ramesh-negative, Ki67 8%-low  · MammaPrint Luminal type A, low risk  · Oncotype DX recurrence score: 6    Treatment Summary  · 05/18/2021 Tamoxifen 20 mg daily  · 06/04/2021 Left breast lumpectomy with negative SLNB, 0/8 positive LN   · 07/02/2021-Switched to Letrozole 2.5mg daily  · 8/11/2021-9/1/2021-Completed XRT 4256 cGy in 16 fractions to Infiltrating ductal carcinoma to left breast  · 11/30/2021 Vitamin D/Calcium daily    Cancer History  Alisa Wade was first seen by me on 7/3/2021. She was referred by Dr. Deepak Araujo for history of left breast IDC. This was in a screening detected lesion. · 4/15/21 Bilateral screening mammogram Payal Killian): A stellate 1.2cm mass identified centrally and inferiorly in the left breast. It is a new finding. A left diagnostic mammogram was performed consisting of spot compression views. This confirms the presence of a stellate mass in the left breast. A targeted, high resolution left breast ultrasound demonstrates intense acoustic shadowing t the 6 o'clock position in the left breast from the 1.2 mass. Mammographic appearance of the right breast is stable. BI-RADS category 4-suspicious for malignancy. Further evaluation is needed. · 4/15/21 US left breast including axilla Payal Killian):  There is a focal area of acoustic shadowing identified at the 6 o'clock position hyperplasia. COMMENT:    Intradepartmental consultation was obtained with Dr. Chani Ahuja who concurs with the above diagnostic impression. · 6/4/21 Breast, right breast excisional biopsy: Fibroadenoma with focal involvement by atypical ductal hyperplasia. Microfocus of atypical ductal hyperplasia identified separately from the fibroadenoma, margins negative. · 6/4/2021she underwent a breast, left lumpectomy: Invasive ductal carcinoma, no special type, grade 1. Invasive carcinoma measures 1.5 cm in greatest dimension microscopically. Invasive carcinoma is located 0.4 cm from the nearest medial surgical excision margin. Focal low-grade ductal carcinoma in situ is identified in conjunction with the invasive lesion. In situ carcinoma is located greater than 1.0 cm from all evaluated margins. Prior biopsy site identified. Sections of skin, negative for evidence of malignancy. Breast, excision of additional left breast medial margin: Focal atypical ductal hyperplasia, margins negative. Negative for evidence of residual in situ or invasive carcinoma. Breast, excision of additional left breast deep margin: Benign adipose tissue and skeletal muscle. Lymph nodes, left sentinel lymph node biopsies: 8 lymph nodes, negative for evidence of malignancy. AJCC STAGE: pT1c, pN0, pMx   · 6/21/21 Oncotype DX recurrence score: 6  · 7/3/2021she was first seen by me. I reviewed medical records. Essentially, stage I, low-grade disease. Tumor measured 1.5 cm. Oncotype DX recommended. Refer to radiation oncology. We will switch her to a letrozole. Bone mineral density recommended  · 7[place]/21 [place] Osteopenia. Left femur neck T-score -1.5. · 7/30/2021Oncotype DX results discussed. No need for adjuvant chemotherapy. Continue radiation. Continue letrozole after radiation as adjuvant treatment for 5 years. Bone density reviewed. Osteopenia.   Continue calcium vitamin D.  · 8/11/2021-9/1/2021-Completed XRT 4256 cGy in 16 fractions to Infiltrating ductal carcinoma to left breast      Past Medical History:    Past Medical History:   Diagnosis Date    Breast cancer ([facility] [address] 04/2021     Left IDC    Cancer ([facility] [address]     breast    Gout     Hyperlipidemia     Hypertension     Osteoarthritis     Vertigo        Past Surgical History:    Past Surgical History:   Procedure Laterality Date    ANUS SURGERY      polyp removed    BREAST BIOPSY      Right-benign    BREAST LUMPECTOMY Left 6/4/2021    LEFT LUMPECTOMY AND SNB, PREOP US AND INTRAOP US GUIDED NEEDLE LOC, BIOSORB, FLAPS, MARGIN PRBE, BILATERAL PEC BLOCK performed by Mitch Bauman MD at [address] [place] [address] Right 6/4/2021    EXCISION RIGHT BREAST LESION WITH NEEDLE LOCALIZATION performed by Mitch Bauman MD at [address] [place] [address]      HYSTERECTOMY  2013    KNEE SURGERY      bilateral replacement    RECTAL SURGERY  8/24/2012    Rigid proctoscopy with transanal excision of rectal polyp    TUBAL LIGATION  1984    US BREAST NEEDLE BIOPSY LEFT Left 4/26/2021    US BREAST NEEDLE BIOPSY LEFT 4/26/2021 [facility]    US BREAST NEEDLE BIOPSY RIGHT Right 6/2/2021    US BREAST NEEDLE BIOPSY RIGHT 6/2/2021 [facility]       Social History:    Marital status:   Smoking status: no  ETOH status: no  Resides: [facility]    Family History:   Family History   Problem Relation Age of Onset    Heart Disease Mother     High Blood Pressure Mother     Heart Disease Father     High Blood Pressure Father     Cancer Sister 79        Ovarian cancer    Cancer Maternal Aunt 60        Ovarian cancer    Cancer Sister 79        squamous cell skin cancer       Current Hospital Medications:    Current Outpatient Medications   Medication Sig Dispense Refill    montelukast (SINGULAIR) 10 MG tablet Take 1 tablet by mouth daily 90 tablet 3    letrozole (FEMARA) 2.5 MG tablet Take 1 tablet by mouth once daily 90 tablet 0    allopurinol (ZYLOPRIM) 100 MG tablet Take 2 tablets by mouth once daily 180 tablet 0    metoprolol tartrate (LOPRESSOR) 25 MG tablet Take 1 tablet by mouth twice daily 180 tablet 0    lisinopril (PRINIVIL;ZESTRIL) 40 MG tablet Take 1 tablet by mouth once daily 90 tablet 1    lovastatin (MEVACOR) 40 MG tablet Take 1 tablet by mouth nightly 90 tablet 1    Calcium Carbonate-Vit D-Min (CALCIUM 1200) 2696-9955 MG-UNIT CHEW Take 1 tablet by mouth daily       Compression Stockings MISC by Does not apply route Low to medium compression stocking just below knee 1 each 0    multivitamin (THERAGRAN) per tablet Take 1 tablet by mouth daily.  aspirin 81 MG EC tablet Take 81 mg by mouth daily.  fish oil-omega-3 fatty acids 1000 MG capsule Take 1 g by mouth daily       HYDROcodone-acetaminophen (NORCO) 5-325 MG per tablet Take 1 tablet by mouth every 6 hours as needed for Pain. 12 tablet 0    mupirocin (BACTROBAN) 2 % ointment Apply to each nostril BID 5 days prior to surgery (Patient not taking: Reported on 7/2/2021) 1 Tube 0     No current facility-administered medications for this visit.        Allergies: No Known Allergies      Subjective   REVIEW OF SYSTEMS:   CONSTITUTIONAL: no fever, no night sweats, no fatigue;  HEENT: no blurring of vision, no double vision, no hearing difficulty, no tinnitus, no ulceration, no dysplasia, no epistaxis;   LUNGS: no cough, no hemoptysis, no wheeze,  no shortness of breath;  CARDIOVASCULAR: no palpitation, no chest pain, no shortness of breath;  GI: no abdominal pain, no nausea, no vomiting, no diarrhea, no constipation;  FABRICIO: no dysuria, no hematuria, no frequency or urgency, no nephrolithiasis;  MUSCULOSKELETAL:  joint pain, LE swelling, no stiffness;  ENDOCRINE: no polyuria, no polydipsia, no cold or heat intolerance;  HEMATOLOGY: no easy bruising or bleeding, no history of clotting disorder;  DERMATOLOGY: no skin rash, no eczema, no pruritus;  PSYCHIATRY: no depression, no anxiety, no panic attacks, no suicidal ideation, no homicidal ideation;  NEUROLOGY: no syncope, no seizures, no numbness or tingling of hands, no numbness or tingling of feet, no paresis;      Objective   BP (!) 170/102   Pulse 64   Ht 5' (1.524 m)   Wt 198 lb 11.2 oz (90.1 kg)   SpO2 96%   BMI 38.81 kg/m²     PHYSICAL EXAM:  CONSTITUTIONAL: Alert, appropriate, no acute distress  EYES: Non icteric, EOM intact, pupils equal round   ENT: Mucus membranes moist, no oral pharyngeal lesions, external inspection of ears and nose are normal  NECK: Supple, no masses. No palpable thyroid mass  CHEST/LUNGS: CTA bilaterally, normal respiratory effort   CARDIOVASCULAR: RRR, no murmurs. No lower extremity edema  ABDOMEN: soft non-tender, active bowel sounds, no HSM. No palpable masses  EXTREMITIES: warm, full ROM in all 4 extremities, no focal weakness. SKIN: warm, dry with no rashes or lesions  LYMPH: No cervical, clavicular, axillary, or inguinal lymphadenopathy  NEUROLOGIC: follows commands, non focal   PSYCH: mood and affect appropriate. Alert and oriented to time, place, person      LABORATORY RESULTS REVIEWED/ANALYZED BY ME:  11/30/2021 CBC  WBC 5.19  HGB 13.5    Neut 3.51    RADIOLOGY STUDIES REVIEWED BY ME:  None      ASSESSMENT:    No orders of the defined types were placed in this encounter. Vernon Sanchez was seen today for follow-up. Diagnoses and all orders for this visit:    Infiltrating ductal carcinoma of breast, left ([address]    Lymphedema of upper extremity    Care plan discussed with patient    Vaccine counseling      IDC left breast, Grade 1 ER 70%, CO 77%, HER-2 negative, pT1 cN0 M0, MammaPrint low risk luminal type A  Essentially, stage I breast cancer   Switched from tamoxifen to Femara for at least 5 years. Oncotype DX recurrence score: 6. No recommendations for adjuvant chemotherapy.       8/11/2021-9/1/2021-Completed XRT 4256 cGy in 16 fractions to Infiltrating ductal carcinoma to left breast  Continue letrozole after radiation as adjuvant treatment for 5 years. Bone density reviewed. Osteopenia. Continue calcium vitamin D. At risk lymphedema-referred to lymphedema clinic. History of osteopenia- July 2021  Osteopenia. Left femur neck T-score -1.5. Continue Carlos/vitamin D    PLAN:  · Continue follow-up with Dr. Rolanda Zheng  · Continue follow-up with Dr. Robert Hinojosa for radiation therapy-completed XRT 9/1/2021  · Continue Letrozole 2.5mg daily x5 years-refill script sent  · Continue follow-up with Lymphedema clinic  · RTC with BETZY Castro in 6 months  · Continue Vitamin D and Calcium daily  · Discussed next Colonoscopy  · Repeat BMD July 2023    Alix Rivera am pre charting  as Medical Assistant for Tri Choudhury MD. Electronically signed by Marifer Murguia MA on 11/30/2021 at 4:43 PM CST. Robert Andujar am scribing for Tri Choudhury MD. Electronically signed by Cheng Santamaria RN on 11/30/2021 at 11:01 AM CST. I, Dr Negrita Huynh, personally performed the services described in this documentation as scribed by Cheng Santamaria RN in my presence and is both accurate and complete. I have seen, examined and reviewed this patient medication list, appropriate labs and imaging studies. I reviewed relevant medical records and others physicians notes. I discussed the plans of care with the patient. I answered all the questions to the patients satisfaction. I have also reviewed the chief complaint (CC) and part of the history (History of Present Illness (HPI), Past Family Social History [facility]), or Review of Systems (ROS) and made changes when appropriated.        (Please note that portions of this note were completed with a voice recognition program. Efforts were made to edit the dictations but occasionally words are mis-transcribed.)    Electronically signed by Tri Choudhury MD on 11/30/2021 at 11:07 AM

## 2021-11-30 ENCOUNTER — HOSPITAL ENCOUNTER (OUTPATIENT)
Dept: INFUSION THERAPY | Age: 76
Discharge: HOME | End: 2021-11-30
Payer: MEDICARE

## 2021-11-30 ENCOUNTER — OFFICE VISIT (OUTPATIENT)
Dept: HEMATOLOGY | Age: 76
End: 2021-11-30
Payer: MEDICARE

## 2021-11-30 VITALS
WEIGHT: 198.7 LBS | HEIGHT: 60 IN | SYSTOLIC BLOOD PRESSURE: 170 MMHG | OXYGEN SATURATION: 96 % | BODY MASS INDEX: 39.01 KG/M2 | HEART RATE: 64 BPM | DIASTOLIC BLOOD PRESSURE: 102 MMHG

## 2021-11-30 DIAGNOSIS — C80.1 MALIGNANT (PRIMARY) NEOPLASM, UNSPECIFIED (HCC): ICD-10-CM

## 2021-11-30 DIAGNOSIS — Z71.85 VACCINE COUNSELING: ICD-10-CM

## 2021-11-30 DIAGNOSIS — I89.0 LYMPHEDEMA OF UPPER EXTREMITY: ICD-10-CM

## 2021-11-30 DIAGNOSIS — C50.912 INFILTRATING DUCTAL CARCINOMA OF BREAST, LEFT (HCC): Primary | ICD-10-CM

## 2021-11-30 DIAGNOSIS — Z71.89 CARE PLAN DISCUSSED WITH PATIENT: ICD-10-CM

## 2021-11-30 LAB
BASOPHILS ABSOLUTE: 0.01 K/UL (ref 0.01–0.08)
BASOPHILS RELATIVE PERCENT: 0.2 % (ref 0.1–1.2)
EOSINOPHILS ABSOLUTE: 0.09 K/UL (ref 0.04–0.54)
EOSINOPHILS RELATIVE PERCENT: 1.7 % (ref 0.7–7)
HCT VFR BLD CALC: 39.3 % (ref 34.1–44.9)
HEMOGLOBIN: 13.5 G/DL (ref 11.2–15.7)
LYMPHOCYTES ABSOLUTE: 1.12 K/UL (ref 1.18–3.74)
LYMPHOCYTES RELATIVE PERCENT: 21.6 % (ref 19.3–53.1)
MCH RBC QN AUTO: 32.2 PG (ref 25.6–32.2)
MCHC RBC AUTO-ENTMCNC: 34.4 G/DL (ref 32.3–35.5)
MCV RBC AUTO: 93.8 FL (ref 79.4–94.8)
MONOCYTES ABSOLUTE: 0.46 K/UL (ref 0.24–0.82)
MONOCYTES RELATIVE PERCENT: 8.9 % (ref 4.7–12.5)
NEUTROPHILS ABSOLUTE: 3.51 K/UL (ref 1.56–6.13)
NEUTROPHILS RELATIVE PERCENT: 67.6 % (ref 34–71.1)
PDW BLD-RTO: 12.5 % (ref 11.7–14.4)
PLATELET # BLD: 121 K/UL (ref 182–369)
PMV BLD AUTO: 10.6 FL (ref 7.4–10.4)
RBC # BLD: 4.19 M/UL (ref 3.93–5.22)
WBC # BLD: 5.19 K/UL (ref 3.98–10.04)

## 2021-11-30 PROCEDURE — 1090F PRES/ABSN URINE INCON ASSESS: CPT | Performed by: INTERNAL MEDICINE

## 2021-11-30 PROCEDURE — 99211 OFF/OP EST MAY X REQ PHY/QHP: CPT

## 2021-11-30 PROCEDURE — G8417 CALC BMI ABV UP PARAM F/U: HCPCS | Performed by: INTERNAL MEDICINE

## 2021-11-30 PROCEDURE — G8482 FLU IMMUNIZE ORDER/ADMIN: HCPCS | Performed by: INTERNAL MEDICINE

## 2021-11-30 PROCEDURE — 1036F TOBACCO NON-USER: CPT | Performed by: INTERNAL MEDICINE

## 2021-11-30 PROCEDURE — G8400 PT W/DXA NO RESULTS DOC: HCPCS | Performed by: INTERNAL MEDICINE

## 2021-11-30 PROCEDURE — 99213 OFFICE O/P EST LOW 20 MIN: CPT | Performed by: INTERNAL MEDICINE

## 2021-11-30 PROCEDURE — 4040F PNEUMOC VAC/ADMIN/RCVD: CPT | Performed by: INTERNAL MEDICINE

## 2021-11-30 PROCEDURE — G8427 DOCREV CUR MEDS BY ELIG CLIN: HCPCS | Performed by: INTERNAL MEDICINE

## 2021-11-30 PROCEDURE — 85025 COMPLETE CBC W/AUTO DIFF WBC: CPT

## 2021-11-30 PROCEDURE — 3017F COLORECTAL CA SCREEN DOC REV: CPT | Performed by: INTERNAL MEDICINE

## 2021-11-30 PROCEDURE — 1123F ACP DISCUSS/DSCN MKR DOCD: CPT | Performed by: INTERNAL MEDICINE

## 2021-11-30 RX ORDER — LETROZOLE 2.5 MG/1
2.5 TABLET, FILM COATED ORAL
Qty: 90 TABLET | Refills: 5 | Status: SHIPPED | OUTPATIENT
Start: 2021-11-30 | End: 2022-05-27 | Stop reason: SDUPTHER

## 2021-12-01 ENCOUNTER — TELEPHONE (OUTPATIENT)
Dept: SURGERY | Age: 76
End: 2021-12-01

## 2021-12-01 NOTE — TELEPHONE ENCOUNTER
Patient is scheduled on 12/16 for both mammo and appt with SHARIFA Zheng.       Mammo is sccheduled at 11:00 and appt is at 1:45pm

## 2021-12-12 NOTE — PROGRESS NOTES
HISTORY OF PRESENT ILLNESS:    Ms. Hank Acevedo presents today for a one month post op breast exam.    She is status post ultrasound guided breast biopsy  on the left which revealed a 0.8 cm low grade invasive ductal carcinoma. ER is positive at 70%. MN is positive at 77%. Her2 is negative. Ki67 is low at 8%. Mammaprint is low risk luminal type A    MRI-5/10/2021  Left breast with a 2.4 x 3.5 cm irregular mass with irregular and   angular margins in the middle third, slightly lower outer breast, 5:00   position, 7 cm from the nipple on axial images. Adjacent biopsy   marker. No additional suspicious finding in the left breast.   Right breast with a 0.9 cm focus of enhancement in the middle third,   slightly upper outer breast, 11:00 position, 5.4 cm from the nipple on   sagittal images. No suspicious axillary or internal mammary adenopathy. The visualized portion of the chest and abdomen appear within normal   limits considering technique.       Impression   1. Right breast with a 0.9 cm focus of enhancement as described above. Recommend second look ultrasound. 2. Redemonstration of known left breast cancer, with a greater size of   enhancement than appreciated by ultrasound. BI-RADS CATEGORY 0: NEED ADDITIONAL EVALUATION AND/OR PRIOR MAMMOGRAMS   FOR COMPARISON. Management Recommendation: Recall for additional imaging. Signed by Dr Segundo Lin on 5/10/2021 3:23 PM     She is now status post left partial mastectomy with left sentinel lymph node biopsy and right excisional biopsy on 6/4/2021. PATHOLOGY REVEALS:    A. Breast, right breast excisional biopsy:   1.  Fibroadenoma with focal involvement by atypical ductal hyperplasia. 2.  Microfocus of atypical ductal hyperplasia identified separately from   the fibroadenoma, margins negative.      Geremias Pock, left lumpectomy:   1.  Invasive ductal carcinoma, no special type, grade 1.   2.  Invasive carcinoma measures 1.5 cm in greatest dimension   microscopically. 3.  Invasive carcinoma is located 0.4 cm from the nearest medial surgical   excision margin. 4.  Focal low-grade ductal carcinoma in situ is identified in conjunction   with the invasive lesion. 5.  In situ carcinoma is located greater than 1.0 cm from all evaluated   margins. 6.  Prior biopsy site identified. 7.  Sections of skin, negative for evidence of malignancy. C.  Breast, excision of additional left breast medial margin:   1.  Focal atypical ductal hyperplasia, margins negative. 2.  Negative for evidence of residual in situ or invasive carcinoma. D.  Breast, excision of additional left breast deep margin: Benign   adipose tissue and skeletal muscle. E.  Lymph nodes, left sentinel lymph node biopsies: 8 lymph nodes,   negative for evidence of malignancy. AJCC STAGE: pT1c, pN0, pMx     She is due to start radiation soon. PHYSICAL EXAM:  The  wounds look good with no evidence of infection, fluid accumulation, or skin necrosis. IMPRESSION:    Doing well s/p left partial mastectomy with left sentinel lymph node biopsy and right excisional biopsy    PLAN: I will see her in two months for exam following radiation completion. She will call us with any concerns. I have seen, examined and reviewed this patient medication list, appropriate labs and imaging studies. I reviewed relevant medical records and others physicians notes. I discussed the plans of care with the patient. I answered all the questions to the patients satisfaction. I, Dr Caridad Mora, personally performed the services described in this documentation as scribed by Marita Landaverde MA in my presence and is both accurate and complete.      (Please note that portions of this note were completed with a voice recognition program. Efforts were made to edit the dictations but occasionally words are mis-transcribed.)  Over 50% of the total visit time of 15 minutes in face to face encounter with the patient, out of which more than 50% of the time was spent in counseling patient or family and coordination of care. Counseling included but was not limited to time spent reviewing labs, imaging studies/ treatment plan and answering questions. Laps, needles and instruments count was reported as correct.

## 2021-12-14 RX ORDER — ALLOPURINOL 100 MG/1
TABLET ORAL
Qty: 180 TABLET | Refills: 1 | Status: SHIPPED | OUTPATIENT
Start: 2021-12-14 | End: 2022-01-24 | Stop reason: SDUPTHER

## 2021-12-15 NOTE — TELEPHONE ENCOUNTER
Karina Grider called to request a refill on her medication.       Last office visit : 7/19/2021   Next office visit : 1/24/2022     Requested Prescriptions     Signed Prescriptions Disp Refills    allopurinol (ZYLOPRIM) 100 MG tablet 180 tablet 1     Sig: Take 2 tablets by mouth once daily     Authorizing Provider: Charlene Prieto     Ordering User: Yuliya Rosario    metoprolol tartrate (LOPRESSOR) 25 MG tablet 180 tablet 1     Sig: Take 1 tablet by mouth twice daily     Authorizing Provider: Charlene Uribe User: Sofia Childs MA

## 2021-12-16 ENCOUNTER — OFFICE VISIT (OUTPATIENT)
Dept: SURGERY | Age: 76
End: 2021-12-16
Payer: MEDICARE

## 2021-12-16 ENCOUNTER — HOSPITAL ENCOUNTER (OUTPATIENT)
Dept: WOMENS IMAGING | Age: 76
Discharge: HOME | End: 2021-12-16
Payer: MEDICARE

## 2021-12-16 VITALS — BODY MASS INDEX: 37.93 KG/M2 | WEIGHT: 193.2 LBS | OXYGEN SATURATION: 98 % | HEIGHT: 60 IN | TEMPERATURE: 98 F

## 2021-12-16 DIAGNOSIS — C50.512 MALIGNANT NEOPLASM OF LOWER-OUTER QUADRANT OF LEFT BREAST OF FEMALE, ESTROGEN RECEPTOR POSITIVE (HCC): Primary | ICD-10-CM

## 2021-12-16 DIAGNOSIS — Z17.0 MALIGNANT NEOPLASM OF LOWER-OUTER QUADRANT OF LEFT BREAST OF FEMALE, ESTROGEN RECEPTOR POSITIVE (HCC): ICD-10-CM

## 2021-12-16 DIAGNOSIS — C50.512 MALIGNANT NEOPLASM OF LOWER-OUTER QUADRANT OF LEFT BREAST OF FEMALE, ESTROGEN RECEPTOR POSITIVE (HCC): ICD-10-CM

## 2021-12-16 DIAGNOSIS — Z98.890 STATUS POST LEFT BREAST LUMPECTOMY: ICD-10-CM

## 2021-12-16 DIAGNOSIS — Z17.0 MALIGNANT NEOPLASM OF LOWER-OUTER QUADRANT OF LEFT BREAST OF FEMALE, ESTROGEN RECEPTOR POSITIVE (HCC): Primary | ICD-10-CM

## 2021-12-16 PROCEDURE — G0279 TOMOSYNTHESIS, MAMMO: HCPCS

## 2021-12-16 PROCEDURE — G8427 DOCREV CUR MEDS BY ELIG CLIN: HCPCS | Performed by: SURGERY

## 2021-12-16 PROCEDURE — 4040F PNEUMOC VAC/ADMIN/RCVD: CPT | Performed by: SURGERY

## 2021-12-16 PROCEDURE — 99213 OFFICE O/P EST LOW 20 MIN: CPT | Performed by: SURGERY

## 2021-12-16 PROCEDURE — G8400 PT W/DXA NO RESULTS DOC: HCPCS | Performed by: SURGERY

## 2021-12-16 PROCEDURE — 1036F TOBACCO NON-USER: CPT | Performed by: SURGERY

## 2021-12-16 PROCEDURE — 3017F COLORECTAL CA SCREEN DOC REV: CPT | Performed by: SURGERY

## 2021-12-16 PROCEDURE — 1123F ACP DISCUSS/DSCN MKR DOCD: CPT | Performed by: SURGERY

## 2021-12-16 PROCEDURE — G8417 CALC BMI ABV UP PARAM F/U: HCPCS | Performed by: SURGERY

## 2021-12-16 PROCEDURE — 1090F PRES/ABSN URINE INCON ASSESS: CPT | Performed by: SURGERY

## 2021-12-16 PROCEDURE — G8482 FLU IMMUNIZE ORDER/ADMIN: HCPCS | Performed by: SURGERY

## 2021-12-16 NOTE — PROGRESS NOTES
1.5 cm in greatest dimension   microscopically. 3.  Invasive carcinoma is located 0.4 cm from the nearest medial surgical   excision margin. 4.  Focal low-grade ductal carcinoma in situ is identified in conjunction   with the invasive lesion. 5.  In situ carcinoma is located greater than 1.0 cm from all evaluated   margins. 6.  Prior biopsy site identified. 7.  Sections of skin, negative for evidence of malignancy. C.  Breast, excision of additional left breast medial margin:   1.  Focal atypical ductal hyperplasia, margins negative. 2.  Negative for evidence of residual in situ or invasive carcinoma. D.  Breast, excision of additional left breast deep margin: Benign   adipose tissue and skeletal muscle. E.  Lymph nodes, left sentinel lymph node biopsies: 8 lymph nodes,   negative for evidence of malignancy. AJCC STAGE: pT1c, pN0, pMx     She has completed radiation therapy     Unilateral left mammogram12/16/2021     EXAMINATION: [facility] DIGITAL DIAGNOSTIC UNILATERAL LEFT 12/16/2021 12:43   PM   HISTORY: Personal history of breast cancer status post left   lumpectomy. Initial six-month follow-up after lumpectomy and radiation   treatment. No current breast complaints. FINDINGS:   Unilateral left digital CC and MLO views obtained. Alda Torrese is made   to exams dated 4/26/2021, 4/15/2021. 3-D tomosynthesis views also   obtained.  Computer-aided detection technology was utilized for   assessment of this examination. There are scattered areas of fibroglandular density (Category B). Post   lumpectomy changes are evident on the left. There is diffuse skin   thickening, presumably related to radiation therapy. Similar linear   calcifications are present, secretory in appearance.  No dominant mass,   unexpected architectural distortion, or suspicious appearing   microcalcifications within the left breast.       Impression   1.  New post lumpectomy changes in the left breast without evidence of residual disease. Bilateral annual mammogram is due in April 2022. 2.  BI-RADS category 2- Benign. PHYSICAL EXAM:  The  wounds look good with no evidence of infection, fluid accumulation, or skin necrosis. She does have  appropriate acute radiation changes in the left breast    IMPRESSION:    Doing well s/p left partial mastectomy with left sentinel lymph node biopsy and right excisional biopsy    PLAN: I will see her back in 6 months with bilateral mammograms. She will call us with any new concerns. I have seen, examined and reviewed this patient medication list, appropriate labs and imaging studies. I reviewed relevant medical records and others physicians notes. I discussed the plans of care with the patient. I answered all the questions to the patients satisfaction. I, Dr Jaida Chavarria, personally performed the services described in this documentation as scribed by Kris Rosen MA in my presence and is both accurate and complete. (Please note that portions of this note were completed with a voice recognition program. Efforts were made to edit the dictations but occasionally words are mis-transcribed.)  Over 50% of the total visit time of 15 minutes in face to face encounter with the patient, out of which more than 50% of the time was spent in counseling patient or family and coordination of care. Counseling included but was not limited to time spent reviewing labs, imaging studies/ treatment plan and answering questions.

## 2021-12-30 DIAGNOSIS — Z17.0 MALIGNANT NEOPLASM OF LOWER-OUTER QUADRANT OF LEFT BREAST OF FEMALE, ESTROGEN RECEPTOR POSITIVE (HCC): Primary | ICD-10-CM

## 2021-12-30 DIAGNOSIS — C50.512 MALIGNANT NEOPLASM OF LOWER-OUTER QUADRANT OF LEFT BREAST OF FEMALE, ESTROGEN RECEPTOR POSITIVE (HCC): Primary | ICD-10-CM

## 2021-12-30 DIAGNOSIS — Z98.890 STATUS POST LEFT BREAST LUMPECTOMY: ICD-10-CM

## 2022-01-10 DIAGNOSIS — C50.912 INFILTRATING DUCTAL CARCINOMA OF BREAST, LEFT (HCC): Primary | ICD-10-CM

## 2022-01-10 DIAGNOSIS — I89.0 LYMPHEDEMA OF UPPER EXTREMITY: ICD-10-CM

## 2022-01-19 ENCOUNTER — HOSPITAL ENCOUNTER (OUTPATIENT)
Dept: PHYSICAL THERAPY | Age: 77
Setting detail: THERAPIES SERIES
Discharge: HOME | End: 2022-01-19
Payer: MEDICARE

## 2022-01-19 PROCEDURE — 97530 THERAPEUTIC ACTIVITIES: CPT

## 2022-01-19 PROCEDURE — 97162 PT EVAL MOD COMPLEX 30 MIN: CPT

## 2022-01-19 NOTE — PROGRESS NOTES
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 I suggested getting a skin adhesion to help it stay in place.   Pain Screening  Patient Currently in Pain: Denies  Vital Signs  Patient Currently in Pain: Denies    Vision/Hearing  Vision  Vision: Within Functional Limits  Hearing  Hearing: Within functional limits    Orientation  Orientation  Overall Orientation Status: Within Normal Limits    Social/Functional History  Social/Functional History  Lives With: Spouse  Type of Home: House  Transfer Assistance: Independent  Active : Yes  Occupation: Retired  Leisure & Hobbies: Gardening, cooking    Objective     Observation/Palpation  Posture: Good  Edema: Continued edema BLEs    AROM RUE (degrees)  RUE AROM : WFL  AROM LUE (degrees)  LUE AROM : WFL  LUE General AROM: Somewhat limited shoulder ROM with horizontal abduction and flexion today    Strength RLE  Strength RLE: WFL  Strength LLE  Strength LLE: WFL  Strength RUE  Strength RUE: WFL  Strength LUE  Strength LUE: WFL     Additional Measures  Special Tests: L-Dex score: 8.1  Other: Circumferential measurements from distal to proximal:           LUE: 19.4, 17, 19.3, 24, 26.7, 27, 28.6, 313, 35.2, 38  Sensation  Overall Sensation Status: WNL                   Exercises  Exercise 1: MLD full sequence education tactile and verbal cuing, patient demonstrated as well  Exercise 2: continued education on compression                      Assessment   Conditions Requiring Skilled Therapeutic Intervention  Assessment: Ms Wilfred Goins returned today for lymphedema treatment. She has worn her sleeve consistently since seen in October and has maintained her swelling decrease. She was educated on MLD sequence to further decrease swelling and decongest arm as a tool to use in the future. She verbalized understanding of each step and was able to repeat them in clinic. She is now discharged to home with no further follow ups needed. She was encouraged to contact the clinic if she has questions or concerns regarding her lymphedema.   Treatment Diagnosis: LUE lymphedema  Prognosis: Good  Decision Making: Low Complexity         Plan   Plan  Plan Comment: Discharge to home with MLD and compression, return to clinic if needed    Goals  Long term goals  Long term goal 1:  (10/13 patient wearing compression garment 12 hours per day consistently)  Patient Goals   Patient goals : no goals eval only       Therapy Time   Individual Concurrent Group Co-treatment   Time In 1301         Time Out 1355         Minutes 54         Timed Code Treatment Minutes: 54 Minutes  Electronically signed by Marco A Bateman PT on 1/19/2022 at 2:00 PM

## 2022-01-24 ENCOUNTER — OFFICE VISIT (OUTPATIENT)
Dept: PRIMARY CARE CLINIC | Age: 77
End: 2022-01-24
Payer: MEDICARE

## 2022-01-24 VITALS
OXYGEN SATURATION: 99 % | HEIGHT: 60 IN | BODY MASS INDEX: 38.24 KG/M2 | HEART RATE: 66 BPM | DIASTOLIC BLOOD PRESSURE: 88 MMHG | SYSTOLIC BLOOD PRESSURE: 136 MMHG | WEIGHT: 194.8 LBS | TEMPERATURE: 97.5 F

## 2022-01-24 DIAGNOSIS — M1A.9XX0 CHRONIC GOUT WITHOUT TOPHUS, UNSPECIFIED CAUSE, UNSPECIFIED SITE: ICD-10-CM

## 2022-01-24 DIAGNOSIS — I10 ESSENTIAL HYPERTENSION: Chronic | ICD-10-CM

## 2022-01-24 DIAGNOSIS — E66.01 SEVERE OBESITY (BMI 35.0-35.9 WITH COMORBIDITY) (HCC): ICD-10-CM

## 2022-01-24 DIAGNOSIS — I89.0 LYMPHEDEMA: ICD-10-CM

## 2022-01-24 DIAGNOSIS — Z17.0 MALIGNANT NEOPLASM OF LOWER-OUTER QUADRANT OF LEFT BREAST OF FEMALE, ESTROGEN RECEPTOR POSITIVE (HCC): Primary | Chronic | ICD-10-CM

## 2022-01-24 DIAGNOSIS — C80.1 MALIGNANT (PRIMARY) NEOPLASM, UNSPECIFIED (HCC): Chronic | ICD-10-CM

## 2022-01-24 DIAGNOSIS — C50.512 MALIGNANT NEOPLASM OF LOWER-OUTER QUADRANT OF LEFT BREAST OF FEMALE, ESTROGEN RECEPTOR POSITIVE (HCC): Primary | Chronic | ICD-10-CM

## 2022-01-24 PROCEDURE — G8427 DOCREV CUR MEDS BY ELIG CLIN: HCPCS | Performed by: FAMILY MEDICINE

## 2022-01-24 PROCEDURE — G8417 CALC BMI ABV UP PARAM F/U: HCPCS | Performed by: FAMILY MEDICINE

## 2022-01-24 PROCEDURE — 99214 OFFICE O/P EST MOD 30 MIN: CPT | Performed by: FAMILY MEDICINE

## 2022-01-24 PROCEDURE — G8482 FLU IMMUNIZE ORDER/ADMIN: HCPCS | Performed by: FAMILY MEDICINE

## 2022-01-24 PROCEDURE — 1090F PRES/ABSN URINE INCON ASSESS: CPT | Performed by: FAMILY MEDICINE

## 2022-01-24 PROCEDURE — 4040F PNEUMOC VAC/ADMIN/RCVD: CPT | Performed by: FAMILY MEDICINE

## 2022-01-24 PROCEDURE — 3017F COLORECTAL CA SCREEN DOC REV: CPT | Performed by: FAMILY MEDICINE

## 2022-01-24 PROCEDURE — G8400 PT W/DXA NO RESULTS DOC: HCPCS | Performed by: FAMILY MEDICINE

## 2022-01-24 PROCEDURE — 1036F TOBACCO NON-USER: CPT | Performed by: FAMILY MEDICINE

## 2022-01-24 PROCEDURE — 1123F ACP DISCUSS/DSCN MKR DOCD: CPT | Performed by: FAMILY MEDICINE

## 2022-01-24 RX ORDER — ALLOPURINOL 100 MG/1
TABLET ORAL
Qty: 180 TABLET | Refills: 1 | Status: SHIPPED | OUTPATIENT
Start: 2022-01-24 | End: 2022-01-24 | Stop reason: SDUPTHER

## 2022-01-24 RX ORDER — ALLOPURINOL 100 MG/1
TABLET ORAL
Qty: 180 TABLET | Refills: 1 | Status: SHIPPED | OUTPATIENT
Start: 2022-01-24 | End: 2022-06-13

## 2022-01-24 ASSESSMENT — ENCOUNTER SYMPTOMS
CONSTIPATION: 0
DIARRHEA: 0
CHEST TIGHTNESS: 0
ABDOMINAL PAIN: 0
WHEEZING: 0
VOMITING: 0
SHORTNESS OF BREATH: 0
NAUSEA: 0
COUGH: 0

## 2022-01-24 NOTE — PROGRESS NOTES
tablet by mouth daily.  aspirin 81 MG EC tablet Take 81 mg by mouth daily.  fish oil-omega-3 fatty acids 1000 MG capsule Take 1 g by mouth daily       HYDROcodone-acetaminophen (NORCO) 5-325 MG per tablet Take 1 tablet by mouth every 6 hours as needed for Pain. 12 tablet 0    mupirocin (BACTROBAN) 2 % ointment Apply to each nostril BID 5 days prior to surgery (Patient not taking: Reported on 7/2/2021) 1 Tube 0     No current facility-administered medications for this visit.        No Known Allergies    Past Surgical History:   Procedure Laterality Date    ANUS SURGERY      polyp removed    BREAST BIOPSY      Right-benign    BREAST LUMPECTOMY Left 6/4/2021    LEFT LUMPECTOMY AND SNB, PREOP US AND INTRAOP US GUIDED NEEDLE LOC, BIOSORB, FLAPS, MARGIN PRBE, BILATERAL PEC BLOCK performed by Taylor Kasper MD at [address] [place] [address] Right 6/4/2021    EXCISION RIGHT BREAST LESION WITH NEEDLE LOCALIZATION performed by Taylor Kasper MD at [address] [place] [address]      HYSTERECTOMY  2013    KNEE SURGERY      bilateral replacement    RECTAL SURGERY  8/24/2012    Rigid proctoscopy with transanal excision of rectal polyp    TUBAL LIGATION  1984    US BREAST NEEDLE BIOPSY LEFT Left 4/26/2021    US BREAST NEEDLE BIOPSY LEFT 4/26/2021 [facility] GENERAL SURGERY    US BREAST NEEDLE BIOPSY RIGHT Right 6/2/2021    US BREAST NEEDLE BIOPSY RIGHT 6/2/2021 [facility] GENERAL SURGERY       Social History     Tobacco Use    Smoking status: Never Smoker    Smokeless tobacco: Never Used   Vaping Use    Vaping Use: Never used   Substance Use Topics    Alcohol use: No    Drug use: No       Family History   Problem Relation Age of Onset    Heart Disease Mother     High Blood Pressure Mother     Heart Disease Father     High Blood Pressure Father     Cancer Sister 79        Ovarian cancer    Cancer Maternal Aunt 61        Ovarian cancer    Cancer Sister 79        squamous cell skin cancer       BP 136/88 (Site: Right Upper Arm, Position: Sitting)   Pulse 66   Temp 97.5 °F (36.4 °C)   Ht 5' (1.524 m)   Wt 194 lb 12.8 oz (88.4 kg)   SpO2 99%   BMI 38.04 kg/m²     Physical Exam  Vitals and nursing note reviewed. Constitutional:       General: She is not in acute distress. Appearance: She is well-developed. She is not diaphoretic. HENT:      Head: Normocephalic and atraumatic. Eyes:      General: No scleral icterus. Cardiovascular:      Rate and Rhythm: Normal rate and regular rhythm. Heart sounds: Normal heart sounds. No murmur heard. Pulmonary:      Effort: Pulmonary effort is normal. No respiratory distress. Breath sounds: Normal breath sounds. No wheezing or rales. Abdominal:      General: Bowel sounds are normal. There is no distension. Palpations: Abdomen is soft. Tenderness: There is no abdominal tenderness. Hernia: No hernia is present. Musculoskeletal:         General: Swelling (LUE mild nonpitting) present. Right lower leg: No edema. Left lower leg: No edema. Skin:     General: Skin is warm and dry. Neurological:      Mental Status: She is alert and oriented to person, place, and time. Psychiatric:         Mood and Affect: Mood is not anxious or depressed. Cognition and Memory: Cognition is not impaired. Assessment:    ICD-10-CM    1. Malignant neoplasm of lower-outer quadrant of left breast of female, estrogen receptor positive ([facility] [address]  C50.512     Z17.0    2. Essential hypertension  I10 CBC Auto Differential     Lipid Panel     TSH without Reflex     Comprehensive Metabolic Panel   3. Malignant (primary) neoplasm, unspecified ([address]  C80.1 TSH without Reflex   4. Lymphedema  I89.0    5. Chronic gout without tophus, unspecified cause, unspecified site  M1A. 9XX0    6. Severe obesity (BMI 35.0-35.9 with comorbidity) (HCC)  E66.01     Z68.35        Plan:   Continue lymphedema massage.   Stable from breast cancer but needs continued monitoring. Hypertension controlled. Continue to see Dr. Luanne Aguilar and Milo Lezama. Orders Placed This Encounter   Procedures    CBC Auto Differential     Standing Status:   Future     Standing Expiration Date:   1/24/2023    Lipid Panel     Standing Status:   Future     Standing Expiration Date:   1/24/2023     Order Specific Question:   Is Patient Fasting?/# of Hours     Answer:   yes/8 hrs    TSH without Reflex     Standing Status:   Future     Standing Expiration Date:   1/24/2023    Comprehensive Metabolic Panel     Standing Status:   Future     Standing Expiration Date:   1/24/2023     Orders Placed This Encounter   Medications    DISCONTD: allopurinol (ZYLOPRIM) 100 MG tablet     Sig: Take 2 tablets by mouth once daily     Dispense:  180 tablet     Refill:  1    DISCONTD: metoprolol tartrate (LOPRESSOR) 25 MG tablet     Sig: Take 1 tablet by mouth twice daily     Dispense:  180 tablet     Refill:  1    metoprolol tartrate (LOPRESSOR) 25 MG tablet     Sig: Take 1 tablet by mouth twice daily     Dispense:  180 tablet     Refill:  1    allopurinol (ZYLOPRIM) 100 MG tablet     Sig: Take 2 tablets by mouth once daily     Dispense:  180 tablet     Refill:  1     Medications Discontinued During This Encounter   Medication Reason    allopurinol (ZYLOPRIM) 100 MG tablet REORDER    metoprolol tartrate (LOPRESSOR) 25 MG tablet REORDER    allopurinol (ZYLOPRIM) 100 MG tablet REORDER    metoprolol tartrate (LOPRESSOR) 25 MG tablet REORDER     There are no Patient Instructions on file for this visit. Patient given educational handouts and has had all questions answered. Patient voices understanding and agrees to plans along with risks and benefits of plan. Patient isinstructed to continue prior meds, diet, and exercise plans unless instructed otherwise. Patient agrees to follow up as instructed and sooner if needed. Patient agrees to go to ER if condition becomes emergent.       Notesmay be

## 2022-02-16 DIAGNOSIS — I10 ESSENTIAL HYPERTENSION: ICD-10-CM

## 2022-02-16 DIAGNOSIS — E78.2 MIXED HYPERLIPIDEMIA: ICD-10-CM

## 2022-02-16 RX ORDER — LISINOPRIL 40 MG/1
TABLET ORAL
Qty: 90 TABLET | Refills: 0 | Status: SHIPPED | OUTPATIENT
Start: 2022-02-16 | End: 2022-05-19

## 2022-02-16 RX ORDER — LOVASTATIN 40 MG/1
40 TABLET ORAL NIGHTLY
Qty: 90 TABLET | Refills: 0 | Status: SHIPPED | OUTPATIENT
Start: 2022-02-16 | End: 2022-05-19

## 2022-05-18 DIAGNOSIS — I10 ESSENTIAL HYPERTENSION: ICD-10-CM

## 2022-05-18 DIAGNOSIS — E78.2 MIXED HYPERLIPIDEMIA: ICD-10-CM

## 2022-05-19 RX ORDER — LISINOPRIL 40 MG/1
TABLET ORAL
Qty: 90 TABLET | Refills: 0 | Status: SHIPPED | OUTPATIENT
Start: 2022-05-19 | End: 2022-07-25 | Stop reason: SDUPTHER

## 2022-05-19 RX ORDER — LOVASTATIN 40 MG/1
40 TABLET ORAL NIGHTLY
Qty: 90 TABLET | Refills: 0 | Status: SHIPPED | OUTPATIENT
Start: 2022-05-19 | End: 2022-07-25 | Stop reason: SDUPTHER

## 2022-05-26 NOTE — PROGRESS NOTES
Progress Note      Pt Name: Leidy Garcia  Birthdate: [birth year]  MRN: [id number]    Date of evaluation: 5/27/2022  History Obtained From:  patient, electronic medical record    CHIEF COMPLAINT:    Chief Complaint   Patient presents with    Follow-up     Infiltrating ductal carcinoma of breast, left ([address]    Other     Osteopenia     HISTORY OF PRESENT ILLNESS:    Leidy Garcia is a 77 y.o.  female who is currently being followed for infiltrating ductal carcinoma of the left breast, ER/MT positive, HER2/ramesh negative, grade 1, 4/26/2021. She is status post left lumpectomy with sentinel lymph node biopsy and adjuvant radiation therapy as of 9/1/2021. Aiyana Camacho is currently taking adjuvant endocrine therapy with letrozole 2.5 mg daily. She returns today in scheduled follow-up for evaluation, side effect monitoring and further treatment recommendations. Today's clinic visit to include physical assessment, review of systems, any lab or radiographic findings that were available and plan of care are documented below. ONCOLOGIC HISTORY:   Diagnosis  · Infiltrating ductal carcinoma, left breast April 2021  · Favor grade 1  · ER 70%, MT 77%, Her-2 ramesh-negative, Ki67 8%-low  · MammaPrint Luminal type A, low risk  · Oncotype DX recurrence score: 6     Treatment Summary  · 05/18/2021 Tamoxifen 20 mg daily  · 06/04/2021 Left breast lumpectomy with negative SLNB, 0/8 positive LN   · 07/02/2021-Switched to Letrozole 2.5mg daily  · 8/11/2021-9/1/2021-Completed XRT 4256 cGy in 16 fractions to Infiltrating ductal carcinoma to left breast  · 11/30/2021 Vitamin D/Calcium daily     Cancer History  Suanne Canavan was first seen by Dr Danisha Brumfield on 7/3/2021. She was referred by Dr. Noreen Banuelos for history of left breast IDC. This was in a screening detected lesion. · 4/15/21 Bilateral screening mammogram Ranjeet Sails):  A stellate 1.2cm mass identified centrally and inferiorly in the left breast. It is a new finding. A left diagnostic mammogram was performed consisting of spot compression views. This confirms the presence of a stellate mass in the left breast. A targeted, high resolution left breast ultrasound demonstrates intense acoustic shadowing t the 6 o'clock position in the left breast from the 1.2 mass. Mammographic appearance of the right breast is stable. BI-RADS category 4-suspicious for malignancy. Further evaluation is needed. · 4/15/21 US left breast including axilla Cleaster Robes): There is a focal area of acoustic shadowing identified at the 6 o'clock position in the left breast corresponding to the stellate mass seen on mammogram. There is so much shadowing it is difficult to tell what is mass versus acoustic shadowing. My best estimate of diameter is 1.2cm. Color Doppler demonstrates little to no blood flow to the mass. This is highly suspicious appearing. BI-RADS category 4, suspicious. Percutaneous biopsy is recommended. · 4/26/21- Breast, left breast needle core biopsies at 5 o'clock position: Infiltrating ductal carcinoma, no special type, favor grade 1. Infiltrating carcinoma measures 0.8 cm in greatest linear dimension and is present in multiple cores. ER 70%, NE 77%, Her-2 ramesh-negative, Ki67 8%-low. Cytology: Lymph node, left axillary lymph node fine-needle aspiration, smears and ThinPrep: Polytypic small round lymphocytes, negative for evidence of metastasis. · 5/10/21 MRI bilateral breast: Right breast with a 0.9 cm focus of enhancement as described above. Recommend second look ultrasound. Redemonstration of known left breast cancer, with a greater size of enhancement than appreciated by ultrasound. · BI-RADS CATEGORY 0: NEED ADDITIONAL EVALUATION AND/OR PRIOR MAMMOGRAMS FOR COMPARISON. Management Recommendation: Recall for additional imaging.    · 5/17/21 US right breast: Suspicious right breast nodule in patient with known left breast cancer Recommendations: Ultrasound-guided biopsy prior to definitive surgical therapy. · 6/2/21 US left breast: Targeted ultrasound demonstrates the previously described cancer at the 5:00 position in the left breast. It measures 1.5 x 1.0 x 0.60 cm. It is hypoechoic with irregular margins and some posterior acoustic shadowing. It is 5 cm from the nipple and 1.5 cm deep. It is unchanged from our last exam and the skin was marked appropriately. Recommendations: Surgical management when appropriate  · 6/2/21 Breast, right breast needle core biopsies at 12 o'clock position: Fibroadenoma with focal involvement by atypical ductal hyperplasia. COMMENT:    Intradepartmental consultation was obtained with Dr. Jeanine Morales who concurs with the above diagnostic impression. · 6/4/21 Breast, right breast excisional biopsy: Fibroadenoma with focal involvement by atypical ductal hyperplasia. Microfocus of atypical ductal hyperplasia identified separately from the fibroadenoma, margins negative. · 6/4/2021-she underwent a breast, left lumpectomy: Invasive ductal carcinoma, no special type, grade 1. Invasive carcinoma measures 1.5 cm in greatest dimension microscopically. Invasive carcinoma is located 0.4 cm from the nearest medial surgical excision margin. Focal low-grade ductal carcinoma in situ is identified in conjunction with the invasive lesion. In situ carcinoma is located greater than 1.0 cm from all evaluated margins. Prior biopsy site identified. Sections of skin, negative for evidence of malignancy. Breast, excision of additional left breast medial margin: Focal atypical ductal hyperplasia, margins negative. Negative for evidence of residual in situ or invasive carcinoma. Breast, excision of additional left breast deep margin: Benign adipose tissue and skeletal muscle. Lymph nodes, left sentinel lymph node biopsies: 8 lymph nodes, negative for evidence of malignancy.  AJCC STAGE: pT1c, pN0, pMx   · 6/21/21 Oncotype DX recurrence score: 6  · 7/3/2021-she was first seen by  [facility]ar. Essentially, stage I, low-grade disease. Tumor measured 1.5 cm. Oncotype DX recommended. Refer to radiation oncology. We will switch her to a letrozole. Bone mineral density recommended  · 2021-Oncotype DX results discussed. No need for adjuvant chemotherapy. Continue radiation. Continue letrozole after radiation as adjuvant treatment for 5 years. Bone density reviewed. Osteopenia. Continue calcium vitamin D.  · 2021-2021-Completed XRT 4256 cGy in 16 fractions to Infiltrating ductal carcinoma to left breast  · 2021 Left mammogram- New post lumpectomy changes in the left breast without evidence of residual disease. Age-appropriate health screenin2017 Colonoscopy [facility])- hyperplastic polyp; 5 year recall  21 Bone density Jatinder Carter): Osteopenia. Left femur neck T-score -1.5.     Past Medical History:    Past Medical History:   Diagnosis Date    Breast cancer ([facility] 2021     Left IDC    Cancer ([facility] [address]     breast    Gout     Hyperlipidemia     Hypertension     Osteoarthritis     Vertigo        Past Surgical History:    Past Surgical History:   Procedure Laterality Date    ANUS SURGERY      polyp removed    BREAST BIOPSY      Right-benign    BREAST LUMPECTOMY Left 2021    LEFT LUMPECTOMY AND SNB, PREOP US AND INTRAOP US GUIDED NEEDLE LOC, BIOSORB, FLAPS, MARGIN PRBE, BILATERAL PEC BLOCK performed by Margarito Valverde MD at [address] [place] [address] Right 2021    EXCISION RIGHT BREAST LESION WITH NEEDLE LOCALIZATION performed by Margarito Valverde MD at [place] [address]      KNEE SURGERY      bilateral replacement    RECTAL SURGERY  2012    Rigid proctoscopy with transanal excision of rectal polyp    TUBAL LIGATION      US BREAST NEEDLE BIOPSY LEFT Left 2021    US BREAST NEEDLE BIOPSY LEFT 2021 [facility] GENERAL SURGERY    US BREAST NEEDLE BIOPSY RIGHT Right 2021    US BREAST NEEDLE BIOPSY RIGHT 6/2/2021 [facility] GENERAL SURGERY       Current Medications:    Current Outpatient Medications   Medication Sig Dispense Refill    lisinopril (PRINIVIL;ZESTRIL) 40 MG tablet Take 1 tablet by mouth once daily 90 tablet 0    lovastatin (MEVACOR) 40 MG tablet Take 1 tablet by mouth nightly 90 tablet 0    allopurinol (ZYLOPRIM) 100 MG tablet Take 2 tablets by mouth once daily 180 tablet 1    metoprolol tartrate (LOPRESSOR) 25 MG tablet Take 1 tablet by mouth twice daily 180 tablet 1    montelukast (SINGULAIR) 10 MG tablet Take 1 tablet by mouth daily 90 tablet 3    Calcium Carbonate-Vit D-Min (CALCIUM 1200) 8374-8651 MG-UNIT CHEW Take 1 tablet by mouth daily       Compression Stockings MISC by Does not apply route Low to medium compression stocking just below knee 1 each 0    multivitamin (THERAGRAN) per tablet Take 1 tablet by mouth daily.  aspirin 81 MG EC tablet Take 81 mg by mouth daily.  fish oil-omega-3 fatty acids 1000 MG capsule Take 1 g by mouth daily       letrozole (FEMARA) 2.5 MG tablet Take 1 tablet by mouth daily 90 tablet 5     No current facility-administered medications for this visit. Allergies: No Known Allergies    Social History:    Social History     Tobacco Use    Smoking status: Never Smoker    Smokeless tobacco: Never Used   Vaping Use    Vaping Use: Never used   Substance Use Topics    Alcohol use: No    Drug use: No       Family History:   Family History   Problem Relation Age of Onset    Heart Disease Mother     High Blood Pressure Mother     Heart Disease Father     High Blood Pressure Father     Cancer Sister 79        Ovarian cancer    Cancer Maternal Aunt 61        Ovarian cancer    Cancer Sister 79        squamous cell skin cancer       Vitals:  Vitals:    05/27/22 0929   BP: 138/88   Pulse: 63   SpO2: 98%   Weight: 193 lb 14.4 oz (88 kg)        Subjective   REVIEW OF SYSTEMS:   Review of Systems   Constitutional: Positive for fatigue. Negative for chills, diaphoresis and fever. HENT: Negative. Negative for congestion, ear pain, hearing loss, nosebleeds, sore throat and tinnitus. Eyes: Negative. Negative for pain, discharge and redness. Respiratory: Negative. Negative for cough, shortness of breath and wheezing. Cardiovascular: Negative. Negative for chest pain, palpitations and leg swelling. Gastrointestinal: Negative. Negative for abdominal pain, blood in stool, constipation, diarrhea, nausea and vomiting. Endocrine: Negative for polydipsia. Genitourinary: Negative for dysuria, flank pain, frequency, hematuria and urgency. Musculoskeletal: Positive for arthralgias. Negative for back pain, myalgias and neck pain. Skin: Negative. Negative for rash. Neurological: Negative. Negative for dizziness, tremors, seizures, weakness and headaches. Hematological: Does not bruise/bleed easily. Psychiatric/Behavioral: Negative. The patient is not nervous/anxious. Objective   PHYSICAL EXAM:  Physical Exam  Vitals reviewed. Constitutional:       General: She is not in acute distress. Appearance: She is well-developed. HENT:      Head: Normocephalic and atraumatic. Mouth/Throat:      Pharynx: Uvula midline. Tonsils: No tonsillar exudate. Eyes:      General: Lids are normal.      Conjunctiva/sclera: Conjunctivae normal.      Pupils: Pupils are equal, round, and reactive to light. Neck:      Thyroid: No thyroid mass or thyromegaly. Vascular: No JVD. Trachea: Trachea normal. No tracheal deviation. Cardiovascular:      Rate and Rhythm: Normal rate and regular rhythm. Pulses: Normal pulses. Heart sounds: Normal heart sounds. Pulmonary:      Effort: Pulmonary effort is normal. No respiratory distress. Breath sounds: Normal breath sounds. No wheezing or rales. Chest:      Chest wall: No tenderness. Abdominal:      General: Bowel sounds are normal. There is no distension. Palpations: Abdomen is soft. There is no mass. Tenderness: There is no abdominal tenderness. There is no guarding. Musculoskeletal:         General: No tenderness or deformity. Cervical back: Normal range of motion and neck supple. Comments: Range of motion within normal limits x4 extremities   Skin:     General: Skin is warm. Findings: No bruising, erythema or rash. Neurological:      Mental Status: She is alert and oriented to person, place, and time. Cranial Nerves: No cranial nerve deficit. Coordination: Coordination normal.   Psychiatric:         Behavior: Behavior normal.         Thought Content: Thought content normal.         Labs reviewed today:  Lab Results   Component Value Date    WBC 6.47 05/27/2022    HGB 13.5 05/27/2022    HCT 42.9 05/27/2022    MCV 94.3 05/27/2022     (L) 05/27/2022     Lab Results   Component Value Date    NEUTROABS 4.28 05/27/2022       ASSESSMENT/PLAN:      1. Infiltrating ductal carcinoma of the left breast, ER/MD positive, HER2/ramesh negative, grade 1, 4/26/2021. She is status post left lumpectomy with sentinel lymph node biopsy and adjuvant radiation therapy as of 9/1/2021. Currently taking adjuvant endocrine therapy with letrozole 2.5 mg daily. Denies any new breast complaints. Bilateral breast examination today revealed no dominant masses, no skin or nipple changes and no axillary adenopathy. No suspicious abnormality to suggest recurrent breast cancer. Right breast with noted radiation changes and well-healed surgical incision. Marycruz Worley was seen in scheduled follow-up by Dr Stanton Bruner on 12/16/2021, I reviewed the progress note from that office visit which documented bilateral breast exam with no new findings or concerns of recurrence.       12/16/2021 Left mammogram- New post lumpectomy changes in the left breast without evidence of residual disease.     -Continue letrozole 2.5 mg daily for anticipated 5 years, July 2026  -Encourage self breast examinations  -Keep appointment for bilateral mammogram on 6/8/2022 and follow-up with Dr. Isis Nichols on 6/16/2022  -Keep follow-up appointment with Dr. Nu Calabrese in October 2022 as scheduled    I discussed the importance of compliance with Femara/letrozole. Decreased compliance with adjuvant endocrine therapy has been linked to decreased survival. We discussed the various barriers and side effects of endocrine therapy and ways to improve compliance. She acknowledged understanding. 2. Encounter for monitoring aromatase inhibitor therapy  -Denies any hot flashes  -Reports chronic arthralgias are stable with no change since initiating letrozole    3. Lymphedema of upper extremity, reports wearing compression sleeve every other day and especially if she is doing yard work. Currently not wearing sleeve. No significant lymphedema noted. -Encouraged compliance with sleeve and completing exercises  -Follow-up with lymphedema clinic as recommended    4. Osteopenia of left femoral neck, July 2021. Osteopenia. Left femur neck T-score -1.5  -Continue taking calcium 1200 mg with vitamin D 1000 units daily  -Repeat bone mineral density study in July 2023     5. Survivorship and health maintenance recommendations  Keep a healthy body weight. Dietary recommendations include limit energy intake from total fats and sugars; increase consumption of fruit and vegetables, as well as legumes, whole grains and nuts. Engage in regular physical activity (150 minutes spread through the week). Other recommendations include minimizing alcohol intake, avoid use of tobacco products. Practice sun safety: Utilize a sunscreen with an SPF of at least 27. Avoiding tanning beds. Ensure adequate amount of sleep. Follow-up with primary care regularly and for further recommendations regarding age-appropriate screening for cancer, wellbeing visit (preventive measures), follow-up and treatment for other medical comorbidities.     I discussed all of the above findings included in the assessment and plan with the patient and the patient is in agreement to move forward with current recommendations/treatment. I have addressed all of their questions and concerns that were verbalized. FOLLOW UP:  1. Follow up given for 4 months or sooner, if needed  2. Continue to follow with other medical providers as recommended  3. Labs at next visit: CBC, CMP and vitamin D level if not recently evaluated    EMR Dragon/Transcription disclaimer:   Much of this encounter note is an electronic transcription/translation of spoken language to printed text. The electronic translation of spoken language may permit erroneous, or at times, nonsensical words or phrases to be inadvertently transcribed; although attempts have made to review the note for such errors, some may still exist.  Please excuse any unrecognized transcription errors and contact us if the air is unintelligible or needs documented correction. Also, portions of this note have been copied forward, however, changed to reflect the most current clinical status of this patient. Electronically signed by BETZY Kingston on 6/7/2022 at 6:45 PM  Syd YANEZ am pre-charting as a registered nurse for [employer].

## 2022-05-27 ENCOUNTER — OFFICE VISIT (OUTPATIENT)
Dept: HEMATOLOGY | Age: 77
End: 2022-05-27
Payer: MEDICARE

## 2022-05-27 ENCOUNTER — HOSPITAL ENCOUNTER (OUTPATIENT)
Dept: INFUSION THERAPY | Age: 77
Discharge: HOME | End: 2022-05-27
Payer: MEDICARE

## 2022-05-27 VITALS
WEIGHT: 193.9 LBS | BODY MASS INDEX: 37.87 KG/M2 | OXYGEN SATURATION: 98 % | DIASTOLIC BLOOD PRESSURE: 88 MMHG | SYSTOLIC BLOOD PRESSURE: 138 MMHG | HEART RATE: 63 BPM

## 2022-05-27 DIAGNOSIS — I89.0 LYMPHEDEMA OF UPPER EXTREMITY: ICD-10-CM

## 2022-05-27 DIAGNOSIS — Z79.811 ENCOUNTER FOR MONITORING AROMATASE INHIBITOR THERAPY: ICD-10-CM

## 2022-05-27 DIAGNOSIS — C80.1 MALIGNANT (PRIMARY) NEOPLASM, UNSPECIFIED (HCC): ICD-10-CM

## 2022-05-27 DIAGNOSIS — C50.912 INFILTRATING DUCTAL CARCINOMA OF BREAST, LEFT (HCC): Primary | ICD-10-CM

## 2022-05-27 DIAGNOSIS — M85.852 OSTEOPENIA OF LEFT FEMORAL NECK: ICD-10-CM

## 2022-05-27 DIAGNOSIS — C50.912 INFILTRATING DUCTAL CARCINOMA OF BREAST, LEFT (HCC): ICD-10-CM

## 2022-05-27 DIAGNOSIS — Z51.81 ENCOUNTER FOR MONITORING AROMATASE INHIBITOR THERAPY: ICD-10-CM

## 2022-05-27 LAB
BASOPHILS ABSOLUTE: 0.01 K/UL (ref 0.01–0.08)
BASOPHILS RELATIVE PERCENT: 0.2 % (ref 0.1–1.2)
EOSINOPHILS ABSOLUTE: 0.16 K/UL (ref 0.04–0.54)
EOSINOPHILS RELATIVE PERCENT: 2.5 % (ref 0.7–7)
HCT VFR BLD CALC: 42.9 % (ref 34.1–44.9)
HEMOGLOBIN: 13.5 G/DL (ref 11.2–15.7)
LYMPHOCYTES ABSOLUTE: 1.48 K/UL (ref 1.18–3.74)
LYMPHOCYTES RELATIVE PERCENT: 22.9 % (ref 19.3–53.1)
MCH RBC QN AUTO: 29.7 PG (ref 25.6–32.2)
MCHC RBC AUTO-ENTMCNC: 31.5 G/DL (ref 32.3–35.5)
MCV RBC AUTO: 94.3 FL (ref 79.4–94.8)
MONOCYTES ABSOLUTE: 0.52 K/UL (ref 0.24–0.82)
MONOCYTES RELATIVE PERCENT: 8 % (ref 4.7–12.5)
NEUTROPHILS ABSOLUTE: 4.28 K/UL (ref 1.56–6.13)
NEUTROPHILS RELATIVE PERCENT: 66.1 % (ref 34–71.1)
PDW BLD-RTO: 13.3 % (ref 11.7–14.4)
PLATELET # BLD: 160 K/UL (ref 182–369)
PMV BLD AUTO: 10.3 FL (ref 7.4–10.4)
RBC # BLD: 4.55 M/UL (ref 3.93–5.22)
WBC # BLD: 6.47 K/UL (ref 3.98–10.04)

## 2022-05-27 PROCEDURE — 85025 COMPLETE CBC W/AUTO DIFF WBC: CPT

## 2022-05-27 PROCEDURE — G8400 PT W/DXA NO RESULTS DOC: HCPCS | Performed by: NURSE PRACTITIONER

## 2022-05-27 PROCEDURE — G8417 CALC BMI ABV UP PARAM F/U: HCPCS | Performed by: NURSE PRACTITIONER

## 2022-05-27 PROCEDURE — 1123F ACP DISCUSS/DSCN MKR DOCD: CPT | Performed by: NURSE PRACTITIONER

## 2022-05-27 PROCEDURE — 99214 OFFICE O/P EST MOD 30 MIN: CPT | Performed by: NURSE PRACTITIONER

## 2022-05-27 PROCEDURE — 99211 OFF/OP EST MAY X REQ PHY/QHP: CPT

## 2022-05-27 PROCEDURE — 1036F TOBACCO NON-USER: CPT | Performed by: NURSE PRACTITIONER

## 2022-05-27 PROCEDURE — 3017F COLORECTAL CA SCREEN DOC REV: CPT | Performed by: NURSE PRACTITIONER

## 2022-05-27 PROCEDURE — G8427 DOCREV CUR MEDS BY ELIG CLIN: HCPCS | Performed by: NURSE PRACTITIONER

## 2022-05-27 PROCEDURE — 1090F PRES/ABSN URINE INCON ASSESS: CPT | Performed by: NURSE PRACTITIONER

## 2022-05-27 RX ORDER — LETROZOLE 2.5 MG/1
2.5 TABLET, FILM COATED ORAL DAILY
Qty: 90 TABLET | Refills: 5 | Status: SHIPPED | OUTPATIENT
Start: 2022-05-27 | End: 2022-09-16

## 2022-05-27 RX ORDER — LETROZOLE 2.5 MG/1
2.5 TABLET, FILM COATED ORAL
Qty: 90 TABLET | Refills: 5 | Status: SHIPPED | OUTPATIENT
Start: 2022-05-27 | End: 2022-05-27 | Stop reason: SDUPTHER

## 2022-06-07 ASSESSMENT — ENCOUNTER SYMPTOMS
CONSTIPATION: 0
VOMITING: 0
DIARRHEA: 0
WHEEZING: 0
NAUSEA: 0
EYES NEGATIVE: 1
GASTROINTESTINAL NEGATIVE: 1
ABDOMINAL PAIN: 0
EYE DISCHARGE: 0
EYE REDNESS: 0
BLOOD IN STOOL: 0
SHORTNESS OF BREATH: 0
BACK PAIN: 0
EYE PAIN: 0
RESPIRATORY NEGATIVE: 1
COUGH: 0
SORE THROAT: 0

## 2022-06-08 ENCOUNTER — HOSPITAL ENCOUNTER (OUTPATIENT)
Dept: WOMENS IMAGING | Age: 77
Discharge: HOME | End: 2022-06-08
Payer: MEDICARE

## 2022-06-08 DIAGNOSIS — Z98.890 STATUS POST LEFT BREAST LUMPECTOMY: ICD-10-CM

## 2022-06-08 DIAGNOSIS — Z17.0 MALIGNANT NEOPLASM OF LOWER-OUTER QUADRANT OF LEFT BREAST OF FEMALE, ESTROGEN RECEPTOR POSITIVE (HCC): ICD-10-CM

## 2022-06-08 DIAGNOSIS — C50.512 MALIGNANT NEOPLASM OF LOWER-OUTER QUADRANT OF LEFT BREAST OF FEMALE, ESTROGEN RECEPTOR POSITIVE (HCC): ICD-10-CM

## 2022-06-08 PROCEDURE — 77066 DX MAMMO INCL CAD BI: CPT | Performed by: RADIOLOGY

## 2022-06-08 PROCEDURE — G0279 TOMOSYNTHESIS, MAMMO: HCPCS

## 2022-06-10 NOTE — PROGRESS NOTES
HISTORY OF PRESENT ILLNESS:    Ms. Jaskaran Ruiz presents today for a 6-month breast exam. This is following left partial mastectomy with left sentinel lymph node biopsy and right excisional biopsy on 6/4/2021. An ultrasound guided breast biopsy  on the left which revealed a 0.8 cm low grade invasive ductal carcinoma. ER is positive at 70%. TX is positive at 77%. Her2 is negative. Ki67 is low at 8%. Mammaprint is low risk luminal type A    MRI-5/10/2021  Left breast with a 2.4 x 3.5 cm irregular mass with irregular and   angular margins in the middle third, slightly lower outer breast, 5:00   position, 7 cm from the nipple on axial images. Adjacent biopsy   marker. No additional suspicious finding in the left breast.   Right breast with a 0.9 cm focus of enhancement in the middle third,   slightly upper outer breast, 11:00 position, 5.4 cm from the nipple on   sagittal images. No suspicious axillary or internal mammary adenopathy. The visualized portion of the chest and abdomen appear within normal   limits considering technique.       Impression   1. Right breast with a 0.9 cm focus of enhancement as described above. Recommend second look ultrasound. 2. Redemonstration of known left breast cancer, with a greater size of   enhancement than appreciated by ultrasound. BI-RADS CATEGORY 0: NEED ADDITIONAL EVALUATION AND/OR PRIOR MAMMOGRAMS   FOR COMPARISON. Management Recommendation: Recall for additional imaging. Signed by Dr Pascale Camargo on 5/10/2021 3:23 PM     PATHOLOGY REVEALS:    A. Breast, right breast excisional biopsy:   1.  Fibroadenoma with focal involvement by atypical ductal hyperplasia. 2.  Microfocus of atypical ductal hyperplasia identified separately from   the fibroadenoma, margins negative. Adelaide Scherer, left lumpectomy:   1.  Invasive ductal carcinoma, no special type, grade 1.   2.  Invasive carcinoma measures 1.5 cm in greatest dimension   microscopically.    3.  Invasive carcinoma is located 0.4 cm from the nearest medial surgical   excision margin. 4.  Focal low-grade ductal carcinoma in situ is identified in conjunction   with the invasive lesion. 5.  In situ carcinoma is located greater than 1.0 cm from all evaluated   margins. 6.  Prior biopsy site identified. 7.  Sections of skin, negative for evidence of malignancy. C.  Breast, excision of additional left breast medial margin:   1.  Focal atypical ductal hyperplasia, margins negative. 2.  Negative for evidence of residual in situ or invasive carcinoma. D.  Breast, excision of additional left breast deep margin: Benign   adipose tissue and skeletal muscle. E.  Lymph nodes, left sentinel lymph node biopsies: 8 lymph nodes,   negative for evidence of malignancy. AJCC STAGE: pT1c, pN0, pMx     She has completed radiation therapy     Mammogram-6/8/2022  Findings: Stable posttreatment change in the LEFT posterior central breast with Biozorb device noted. No suspicious mass, calcifications, or architectural distortion. IMPRESSION:  1. No mammographic evidence of malignancy. Recommendation is for the patient to return for routine mammography in one year or sooner, if clinically indicated. 2. BI-RADS category 2: Benign findings   Signed by Dr Sarah Hendrickson:  The  wounds look good with no evidence of infection, fluid accumulation, or skin necrosis. She does have  appropriate acute radiation changes in the left breast    IMPRESSION:    Doing well s/p left partial mastectomy with left sentinel lymph node biopsy and right excisional biopsy    PLAN: I will see her back in 6-months for a physical exam. She will call with any new concerns. I have seen, examined and reviewed this patient medication list, appropriate labs and imaging studies. I reviewed relevant medical records and others physicians notes. I discussed the plans of care with the patient.  I answered all the questions to the patients satisfaction. I, Dr Emelina Velasquez, personally performed the services described in this documentation as scribed by Sandra Ly MA in my presence and is both accurate and complete. (Please note that portions of this note were completed with a voice recognition program. Efforts were made to edit the dictations but occasionally words are mis-transcribed.)  Over 50% of the total visit time of 20 minutes in face to face encounter with the patient, out of which more than 50% of the time was spent in counseling patient or family and coordination of care. Counseling included but was not limited to time spent reviewing labs, imaging studies/ treatment plan and answering questions.

## 2022-06-13 RX ORDER — ALLOPURINOL 100 MG/1
TABLET ORAL
Qty: 180 TABLET | Refills: 0 | Status: SHIPPED | OUTPATIENT
Start: 2022-06-13 | End: 2022-07-25 | Stop reason: SDUPTHER

## 2022-06-15 NOTE — PROGRESS NOTES
HISTORY OF PRESENT ILLNESS:    Ms. Marietta Chavez presents today for a two month breast exam following completion of radiation. She is status post ultrasound guided breast biopsy  on the left which revealed a 0.8 cm low grade invasive ductal carcinoma. ER is positive at 70%. NC is positive at 77%. Her2 is negative. Ki67 is low at 8%. Mammaprint is low risk luminal type A    MRI-5/10/2021  Left breast with a 2.4 x 3.5 cm irregular mass with irregular and   angular margins in the middle third, slightly lower outer breast, 5:00   position, 7 cm from the nipple on axial images. Adjacent biopsy   marker. No additional suspicious finding in the left breast.   Right breast with a 0.9 cm focus of enhancement in the middle third,   slightly upper outer breast, 11:00 position, 5.4 cm from the nipple on   sagittal images. No suspicious axillary or internal mammary adenopathy. The visualized portion of the chest and abdomen appear within normal   limits considering technique.       Impression   1. Right breast with a 0.9 cm focus of enhancement as described above. Recommend second look ultrasound. 2. Redemonstration of known left breast cancer, with a greater size of   enhancement than appreciated by ultrasound. BI-RADS CATEGORY 0: NEED ADDITIONAL EVALUATION AND/OR PRIOR MAMMOGRAMS   FOR COMPARISON. Management Recommendation: Recall for additional imaging. Signed by Dr Venessa Donohue on 5/10/2021 3:23 PM     She is now status post left partial mastectomy with left sentinel lymph node biopsy and right excisional biopsy on 6/4/2021. PATHOLOGY REVEALS:    A. Breast, right breast excisional biopsy:   1.  Fibroadenoma with focal involvement by atypical ductal hyperplasia. 2.  Microfocus of atypical ductal hyperplasia identified separately from   the fibroadenoma, margins negative.      David Godfrey, left lumpectomy:   1.  Invasive ductal carcinoma, no special type, grade 1.   2.  Invasive carcinoma measures 1.5 cm in greatest dimension   microscopically. 3.  Invasive carcinoma is located 0.4 cm from the nearest medial surgical   excision margin. 4.  Focal low-grade ductal carcinoma in situ is identified in conjunction   with the invasive lesion. 5.  In situ carcinoma is located greater than 1.0 cm from all evaluated   margins. 6.  Prior biopsy site identified. 7.  Sections of skin, negative for evidence of malignancy. C.  Breast, excision of additional left breast medial margin:   1.  Focal atypical ductal hyperplasia, margins negative. 2.  Negative for evidence of residual in situ or invasive carcinoma. D.  Breast, excision of additional left breast deep margin: Benign   adipose tissue and skeletal muscle. E.  Lymph nodes, left sentinel lymph node biopsies: 8 lymph nodes,   negative for evidence of malignancy. AJCC STAGE: pT1c, pN0, pMx     She has completed radiation therapy     PHYSICAL EXAM:  The  wounds look good with no evidence of infection, fluid accumulation, or skin necrosis. She does have  appropriate acute radiation changes in the left breast    IMPRESSION:    Doing well s/p left partial mastectomy with left sentinel lymph node biopsy and right excisional biopsy    PLAN: I will see her back in three months for exam and with a unilateral left mammogram. She will call us with any new concerns. I have seen, examined and reviewed this patient medication list, appropriate labs and imaging studies. I reviewed relevant medical records and others physicians notes. I discussed the plans of care with the patient. I answered all the questions to the patients satisfaction. I, Dr Darcy Chavez, personally performed the services described in this documentation as scribed by Sun Mitchell MA in my presence and is both accurate and complete.      (Please note that portions of this note were completed with a voice recognition program. Efforts were made to edit the dictations but occasionally words are mis-transcribed.)  Over 50% of the total visit time of 15 minutes in face to face encounter with the patient, out of which more than 50% of the time was spent in counseling patient or family and coordination of care. Counseling included but was not limited to time spent reviewing labs, imaging studies/ treatment plan and answering questions. Spironolactone Counseling: Patient advised regarding risks of diarrhea, abdominal pain, hyperkalemia, birth defects (for female patients), liver toxicity and renal toxicity. The patient may need blood work to monitor liver and kidney function and potassium levels while on therapy. The patient verbalized understanding of the proper use and possible adverse effects of spironolactone.  All of the patient's questions and concerns were addressed.

## 2022-06-16 ENCOUNTER — OFFICE VISIT (OUTPATIENT)
Dept: SURGERY | Age: 77
End: 2022-06-16
Payer: MEDICARE

## 2022-06-16 VITALS — SYSTOLIC BLOOD PRESSURE: 132 MMHG | HEART RATE: 80 BPM | DIASTOLIC BLOOD PRESSURE: 76 MMHG

## 2022-06-16 DIAGNOSIS — Z98.890 STATUS POST LEFT BREAST LUMPECTOMY: ICD-10-CM

## 2022-06-16 DIAGNOSIS — C50.512 MALIGNANT NEOPLASM OF LOWER-OUTER QUADRANT OF LEFT BREAST OF FEMALE, ESTROGEN RECEPTOR POSITIVE (HCC): Primary | Chronic | ICD-10-CM

## 2022-06-16 DIAGNOSIS — Z17.0 MALIGNANT NEOPLASM OF LOWER-OUTER QUADRANT OF LEFT BREAST OF FEMALE, ESTROGEN RECEPTOR POSITIVE (HCC): Primary | Chronic | ICD-10-CM

## 2022-06-16 PROCEDURE — G8400 PT W/DXA NO RESULTS DOC: HCPCS | Performed by: SURGERY

## 2022-06-16 PROCEDURE — G8427 DOCREV CUR MEDS BY ELIG CLIN: HCPCS | Performed by: SURGERY

## 2022-06-16 PROCEDURE — 99213 OFFICE O/P EST LOW 20 MIN: CPT | Performed by: SURGERY

## 2022-06-16 PROCEDURE — 1090F PRES/ABSN URINE INCON ASSESS: CPT | Performed by: SURGERY

## 2022-06-16 PROCEDURE — 3017F COLORECTAL CA SCREEN DOC REV: CPT | Performed by: SURGERY

## 2022-06-16 PROCEDURE — G8417 CALC BMI ABV UP PARAM F/U: HCPCS | Performed by: SURGERY

## 2022-06-16 PROCEDURE — 1123F ACP DISCUSS/DSCN MKR DOCD: CPT | Performed by: SURGERY

## 2022-06-16 PROCEDURE — 1036F TOBACCO NON-USER: CPT | Performed by: SURGERY

## 2022-07-18 DIAGNOSIS — E78.2 MIXED HYPERLIPIDEMIA: ICD-10-CM

## 2022-07-18 DIAGNOSIS — C80.1 MALIGNANT (PRIMARY) NEOPLASM, UNSPECIFIED (HCC): Chronic | ICD-10-CM

## 2022-07-18 DIAGNOSIS — I10 ESSENTIAL HYPERTENSION: ICD-10-CM

## 2022-07-18 LAB
ALBUMIN SERPL-MCNC: 3.9 G/DL (ref 3.5–5.2)
ALP BLD-CCNC: 96 U/L (ref 35–104)
ALT SERPL-CCNC: 21 U/L (ref 5–33)
ANION GAP SERPL CALCULATED.3IONS-SCNC: 9 MMOL/L (ref 7–19)
AST SERPL-CCNC: 25 U/L (ref 5–32)
BACTERIA: NEGATIVE /HPF
BILIRUB SERPL-MCNC: 0.5 MG/DL (ref 0.2–1.2)
BILIRUBIN URINE: NEGATIVE
BLOOD, URINE: NEGATIVE
BUN BLDV-MCNC: 17 MG/DL (ref 8–23)
CALCIUM SERPL-MCNC: 9.7 MG/DL (ref 8.8–10.2)
CHLORIDE BLD-SCNC: 102 MMOL/L (ref 98–111)
CHOLESTEROL, TOTAL: 177 MG/DL (ref 160–199)
CLARITY: CLEAR
CO2: 29 MMOL/L (ref 22–29)
COLOR: YELLOW
CREAT SERPL-MCNC: 0.7 MG/DL (ref 0.5–0.9)
CRYSTALS, UA: ABNORMAL /HPF
EPITHELIAL CELLS, UA: 0 /HPF (ref 0–5)
GFR AFRICAN AMERICAN: >59
GFR NON-AFRICAN AMERICAN: >60
GLUCOSE BLD-MCNC: 100 MG/DL (ref 74–109)
GLUCOSE URINE: NEGATIVE MG/DL
HCT VFR BLD CALC: 43.1 % (ref 37–47)
HDLC SERPL-MCNC: 52 MG/DL (ref 65–121)
HEMOGLOBIN: 14 G/DL (ref 12–16)
HYALINE CASTS: 1 /HPF (ref 0–8)
KETONES, URINE: NEGATIVE MG/DL
LDL CHOLESTEROL CALCULATED: 96 MG/DL
LEUKOCYTE ESTERASE, URINE: ABNORMAL
MCH RBC QN AUTO: 29.3 PG (ref 27–31)
MCHC RBC AUTO-ENTMCNC: 32.5 G/DL (ref 33–37)
MCV RBC AUTO: 90.2 FL (ref 81–99)
NITRITE, URINE: NEGATIVE
PDW BLD-RTO: 13.6 % (ref 11.5–14.5)
PH UA: 7.5 (ref 5–8)
PLATELET # BLD: 181 K/UL (ref 130–400)
PMV BLD AUTO: 10.6 FL (ref 9.4–12.3)
POTASSIUM SERPL-SCNC: 4.6 MMOL/L (ref 3.5–5)
PROTEIN UA: NEGATIVE MG/DL
RBC # BLD: 4.78 M/UL (ref 4.2–5.4)
RBC UA: 1 /HPF (ref 0–4)
SODIUM BLD-SCNC: 140 MMOL/L (ref 136–145)
SPECIFIC GRAVITY UA: 1.01 (ref 1–1.03)
TOTAL PROTEIN: 7 G/DL (ref 6.6–8.7)
TRIGL SERPL-MCNC: 145 MG/DL (ref 0–149)
TSH SERPL DL<=0.05 MIU/L-ACNC: 1.51 UIU/ML (ref 0.27–4.2)
UROBILINOGEN, URINE: 0.2 E.U./DL
WBC # BLD: 6.7 K/UL (ref 4.8–10.8)
WBC UA: 4 /HPF (ref 0–5)

## 2022-07-24 SDOH — HEALTH STABILITY: PHYSICAL HEALTH: ON AVERAGE, HOW MANY DAYS PER WEEK DO YOU ENGAGE IN MODERATE TO STRENUOUS EXERCISE (LIKE A BRISK WALK)?: 5 DAYS

## 2022-07-24 SDOH — HEALTH STABILITY: PHYSICAL HEALTH: ON AVERAGE, HOW MANY MINUTES DO YOU ENGAGE IN EXERCISE AT THIS LEVEL?: 30 MIN

## 2022-07-24 ASSESSMENT — PATIENT HEALTH QUESTIONNAIRE - PHQ9
1. LITTLE INTEREST OR PLEASURE IN DOING THINGS: 0
SUM OF ALL RESPONSES TO PHQ QUESTIONS 1-9: 0
SUM OF ALL RESPONSES TO PHQ9 QUESTIONS 1 & 2: 0
2. FEELING DOWN, DEPRESSED OR HOPELESS: 0
SUM OF ALL RESPONSES TO PHQ QUESTIONS 1-9: 0

## 2022-07-24 ASSESSMENT — LIFESTYLE VARIABLES
HOW MANY STANDARD DRINKS CONTAINING ALCOHOL DO YOU HAVE ON A TYPICAL DAY: 0
HOW OFTEN DO YOU HAVE A DRINK CONTAINING ALCOHOL: NEVER
HOW OFTEN DO YOU HAVE SIX OR MORE DRINKS ON ONE OCCASION: 1
HOW OFTEN DO YOU HAVE A DRINK CONTAINING ALCOHOL: 1
HOW MANY STANDARD DRINKS CONTAINING ALCOHOL DO YOU HAVE ON A TYPICAL DAY: PATIENT DOES NOT DRINK

## 2022-07-25 ENCOUNTER — OFFICE VISIT (OUTPATIENT)
Dept: PRIMARY CARE CLINIC | Age: 77
End: 2022-07-25
Payer: MEDICARE

## 2022-07-25 VITALS
DIASTOLIC BLOOD PRESSURE: 80 MMHG | HEART RATE: 73 BPM | WEIGHT: 186.4 LBS | TEMPERATURE: 97.2 F | HEIGHT: 60 IN | BODY MASS INDEX: 36.6 KG/M2 | OXYGEN SATURATION: 98 % | SYSTOLIC BLOOD PRESSURE: 130 MMHG

## 2022-07-25 DIAGNOSIS — I10 ESSENTIAL HYPERTENSION: Primary | ICD-10-CM

## 2022-07-25 DIAGNOSIS — M1A.9XX0 CHRONIC GOUT WITHOUT TOPHUS, UNSPECIFIED CAUSE, UNSPECIFIED SITE: ICD-10-CM

## 2022-07-25 DIAGNOSIS — E78.2 MIXED HYPERLIPIDEMIA: ICD-10-CM

## 2022-07-25 PROCEDURE — 99214 OFFICE O/P EST MOD 30 MIN: CPT | Performed by: NURSE PRACTITIONER

## 2022-07-25 PROCEDURE — 1123F ACP DISCUSS/DSCN MKR DOCD: CPT | Performed by: NURSE PRACTITIONER

## 2022-07-25 RX ORDER — LOVASTATIN 40 MG/1
40 TABLET ORAL NIGHTLY
Qty: 90 TABLET | Refills: 0 | Status: SHIPPED | OUTPATIENT
Start: 2022-07-25 | End: 2022-10-27

## 2022-07-25 RX ORDER — LISINOPRIL 40 MG/1
TABLET ORAL
Qty: 90 TABLET | Refills: 0 | Status: SHIPPED | OUTPATIENT
Start: 2022-07-25 | End: 2022-10-27

## 2022-07-25 RX ORDER — ALLOPURINOL 100 MG/1
TABLET ORAL
Qty: 180 TABLET | Refills: 0 | Status: SHIPPED | OUTPATIENT
Start: 2022-07-25

## 2022-07-25 SDOH — ECONOMIC STABILITY: FOOD INSECURITY: WITHIN THE PAST 12 MONTHS, THE FOOD YOU BOUGHT JUST DIDN'T LAST AND YOU DIDN'T HAVE MONEY TO GET MORE.: NEVER TRUE

## 2022-07-25 SDOH — ECONOMIC STABILITY: FOOD INSECURITY: WITHIN THE PAST 12 MONTHS, YOU WORRIED THAT YOUR FOOD WOULD RUN OUT BEFORE YOU GOT MONEY TO BUY MORE.: NEVER TRUE

## 2022-07-25 ASSESSMENT — ENCOUNTER SYMPTOMS
ABDOMINAL PAIN: 0
CHEST TIGHTNESS: 0
DIARRHEA: 0
COLOR CHANGE: 0
SORE THROAT: 0
NAUSEA: 0
COUGH: 0
SHORTNESS OF BREATH: 0
VOMITING: 0

## 2022-07-25 ASSESSMENT — SOCIAL DETERMINANTS OF HEALTH (SDOH): HOW HARD IS IT FOR YOU TO PAY FOR THE VERY BASICS LIKE FOOD, HOUSING, MEDICAL CARE, AND HEATING?: NOT HARD AT ALL

## 2022-07-25 NOTE — PROGRESS NOTES
[address] [address]     Phone:  [phone]  Fax:  [phone]      Jose Roberto Gao is a 77 y.o. female who presents today for her medical conditions/complaints as noted below. Jose Roberto Gao is c/o of Establish Care      Chief Complaint   Patient presents with    Establish Care       HPI:     HPI     Patient presents to establish care-former patient . Patient reports no concerns other than continues with discomfort all over but just \"glad I can get in my garden. \"  Patient reports her blood pressure is well controlled. Takes allopurinol daily for gout, with no recent flareups.     Past Medical History:   Diagnosis Date    Breast cancer ([facility] 04/2021     Left IDC    Cancer ([facility]     breast    Gout     Hyperlipidemia     Hypertension     Osteoarthritis     Squamous cell carcinoma of face     Vertigo         Past Surgical History:   Procedure Laterality Date    ANUS SURGERY      polyp removed    BREAST BIOPSY      Right-benign    BREAST LUMPECTOMY Left 06/04/2021    LEFT LUMPECTOMY AND SNB, PREOP US AND INTRAOP US GUIDED NEEDLE LOC, BIOSORB, FLAPS, MARGIN PRBE, BILATERAL PEC BLOCK performed by Monie Coughlin MD at [facility] [address] Right 06/04/2021    EXCISION RIGHT BREAST LESION WITH NEEDLE LOCALIZATION performed by Monie Coughlin MD at [address] [place] [address] ([address] [place] [address])      HYSTERECTOMY (CERVIX STATUS UNKNOWN)  2013    KNEE SURGERY      bilateral replacement    RECTAL SURGERY  08/24/2012    Rigid proctoscopy with transanal excision of rectal polyp    SKIN CANCER EXCISION  04/2022    TUBAL LIGATION  1984    US BREAST NEEDLE BIOPSY LEFT Left 04/26/2021    US BREAST NEEDLE BIOPSY LEFT 4/26/2021 [facility]    US BREAST NEEDLE BIOPSY RIGHT Right 06/02/2021    US BREAST NEEDLE BIOPSY RIGHT 6/2/2021 [facility]       Social History     Tobacco Use    Smoking status: Never    Smokeless tobacco: Never Substance Use Topics    Alcohol use: No        Current Outpatient Medications   Medication Sig Dispense Refill    metoprolol tartrate (LOPRESSOR) 25 MG tablet Take 1 tablet by mouth twice daily 180 tablet 0    allopurinol (ZYLOPRIM) 100 MG tablet Take 2 tablets by mouth once daily 180 tablet 0    lisinopril (PRINIVIL;ZESTRIL) 40 MG tablet Take 1 tablet by mouth once daily 90 tablet 0    lovastatin (MEVACOR) 40 MG tablet Take 1 tablet by mouth nightly 90 tablet 0    montelukast (SINGULAIR) 10 MG tablet Take 1 tablet by mouth daily 90 tablet 3    Calcium Carbonate-Vit D-Min (CALCIUM 1200) 6904-2792 MG-UNIT CHEW Take 1 tablet by mouth daily       Compression Stockings MISC by Does not apply route Low to medium compression stocking just below knee 1 each 0    multivitamin (THERAGRAN) per tablet Take 1 tablet by mouth daily. aspirin 81 MG EC tablet Take 81 mg by mouth daily. fish oil-omega-3 fatty acids 1000 MG capsule Take 1 g by mouth daily       letrozole (FEMARA) 2.5 MG tablet Take 1 tablet by mouth daily 90 tablet 5     No current facility-administered medications for this visit. No Known Allergies    Family History   Problem Relation Age of Onset    Heart Disease Mother     High Blood Pressure Mother     Heart Disease Father     High Blood Pressure Father     Ovarian Cancer Sister 79        Ovarian cancer    Cancer Maternal Aunt 61        Ovarian cancer    Cancer Sister 79        squamous cell skin cancer               Subjective:      Review of Systems   Constitutional:  Negative for activity change and fever. HENT:  Negative for congestion, ear pain and sore throat. Respiratory:  Negative for cough, chest tightness and shortness of breath. Cardiovascular:  Negative for chest pain. Gastrointestinal:  Negative for abdominal pain, diarrhea, nausea and vomiting. Genitourinary:  Negative for frequency and urgency. Musculoskeletal:  Negative for arthralgias and myalgias.    Skin: Negative for color change. Neurological:  Negative for dizziness, weakness and numbness. Psychiatric/Behavioral:  Negative for agitation. The patient is not nervous/anxious. Objective:     Physical Exam  Vitals reviewed. Constitutional:       Appearance: Normal appearance. HENT:      Head: Normocephalic. Right Ear: Tympanic membrane normal.      Left Ear: Tympanic membrane normal.      Nose: Nose normal.      Mouth/Throat:      Mouth: Mucous membranes are moist.      Pharynx: Oropharynx is clear. Eyes:      Extraocular Movements: Extraocular movements intact. Pupils: Pupils are equal, round, and reactive to light. Cardiovascular:      Rate and Rhythm: Normal rate and regular rhythm. Pulses: Normal pulses. Heart sounds: Normal heart sounds. Pulmonary:      Effort: Pulmonary effort is normal.      Breath sounds: Normal breath sounds. Abdominal:      General: Bowel sounds are normal.      Palpations: Abdomen is soft. Musculoskeletal:         General: Normal range of motion. Cervical back: Normal range of motion. Skin:     General: Skin is warm and dry. Neurological:      Mental Status: She is alert and oriented to person, place, and time. Psychiatric:         Mood and Affect: Mood normal.         Behavior: Behavior normal.         Thought Content: Thought content normal.         Judgment: Judgment normal.       /80   Pulse 73   Temp 97.2 °F (36.2 °C) (Temporal)   Ht 4' 11.75\" (1.518 m)   Wt 186 lb 6.4 oz (84.6 kg)   SpO2 98%   BMI 36.71 kg/m²     Assessment:      Diagnosis Orders   1. Essential hypertension  metoprolol tartrate (LOPRESSOR) 25 MG tablet    lisinopril (PRINIVIL;ZESTRIL) 40 MG tablet      2. Mixed hyperlipidemia  lovastatin (MEVACOR) 40 MG tablet      3. Chronic gout without tophus, unspecified cause, unspecified site  allopurinol (ZYLOPRIM) 100 MG tablet          No results found for this visit on 07/25/22. Plan:     1.  Essential errors, some may still exist.    Electronically signed by BETZY Presley Arm, CNP on 7/31/2022 at 7:48 PM

## 2022-09-09 SDOH — HEALTH STABILITY: PHYSICAL HEALTH: ON AVERAGE, HOW MANY DAYS PER WEEK DO YOU ENGAGE IN MODERATE TO STRENUOUS EXERCISE (LIKE A BRISK WALK)?: 6 DAYS

## 2022-09-09 SDOH — HEALTH STABILITY: PHYSICAL HEALTH: ON AVERAGE, HOW MANY MINUTES DO YOU ENGAGE IN EXERCISE AT THIS LEVEL?: 30 MIN

## 2022-09-09 ASSESSMENT — PATIENT HEALTH QUESTIONNAIRE - PHQ9
2. FEELING DOWN, DEPRESSED OR HOPELESS: 0
SUM OF ALL RESPONSES TO PHQ QUESTIONS 1-9: 0
SUM OF ALL RESPONSES TO PHQ QUESTIONS 1-9: 0
SUM OF ALL RESPONSES TO PHQ9 QUESTIONS 1 & 2: 0
SUM OF ALL RESPONSES TO PHQ QUESTIONS 1-9: 0
1. LITTLE INTEREST OR PLEASURE IN DOING THINGS: 0
SUM OF ALL RESPONSES TO PHQ QUESTIONS 1-9: 0

## 2022-09-09 ASSESSMENT — LIFESTYLE VARIABLES
HOW MANY STANDARD DRINKS CONTAINING ALCOHOL DO YOU HAVE ON A TYPICAL DAY: PATIENT DOES NOT DRINK
HOW MANY STANDARD DRINKS CONTAINING ALCOHOL DO YOU HAVE ON A TYPICAL DAY: 0
HOW OFTEN DO YOU HAVE A DRINK CONTAINING ALCOHOL: 1
HOW OFTEN DO YOU HAVE A DRINK CONTAINING ALCOHOL: NEVER
HOW OFTEN DO YOU HAVE SIX OR MORE DRINKS ON ONE OCCASION: 1

## 2022-09-14 DIAGNOSIS — C50.912 INFILTRATING DUCTAL CARCINOMA OF BREAST, LEFT (HCC): Primary | ICD-10-CM

## 2022-09-14 NOTE — PROGRESS NOTES
Progress Note      Pt Name: Dinesh Sidhu  Birthdate: [birth year]  MRN: [id number]    Date of evaluation: 09/16/2022  History Obtained From:  patient, electronic medical record    CHIEF COMPLAINT:    Chief Complaint   Patient presents with    Follow-up     Infiltrating ductal carcinoma of breast, left ([address]    Other     Osteopenia     HISTORY OF PRESENT ILLNESS:    Dinesh Sidhu is a 69 y.o.  female who is currently being followed for infiltrating ductal carcinoma of the left breast, ER/SD positive, HER2/ramesh negative, grade 1, 4/26/2021. She is status post left lumpectomy with sentinel lymph node biopsy and adjuvant radiation therapy as of 9/1/2021. Tay Eubanks is currently taking adjuvant endocrine therapy with letrozole 2.5 mg daily. She returns today in scheduled follow-up for evaluation, side effect monitoring and further treatment recommendations. Tay Eubanks presents today indicating that she is compliant with the letrozole and has no new breast complaints. Today's clinic visit to include physical assessment, review of systems, any lab or radiographic findings that were available and plan of care are documented below. ONCOLOGIC HISTORY:   Diagnosis  Infiltrating ductal carcinoma, left breast April 2021  Favor grade 1  ER 70%, SD 77%, Her-2 ramesh-negative, Ki67 8%-low  MammaPrint Luminal type A, low risk  Oncotype DX recurrence score: 6     Treatment Summary  05/18/2021 Tamoxifen 20 mg daily  06/04/2021 Left breast lumpectomy with negative SLNB, 0/8 positive LN   07/02/2021-Switched to Letrozole 2.5mg daily  8/11/2021-9/1/2021-Completed XRT 4256 cGy in 16 fractions to Infiltrating ductal carcinoma to left breast  11/30/2021 Vitamin D/Calcium daily     Cancer History  Federica Hartley was first seen by Dr Fadumo Schmidt on 7/3/2021. She was referred by Dr. Jay Dawn for history of left breast IDC. This was in a screening detected lesion. 4/15/21 Bilateral screening mammogram Connor Boyle):  A stellate 1.2cm mass identified centrally and inferiorly in the left breast. It is a new finding. A left diagnostic mammogram was performed consisting of spot compression views. This confirms the presence of a stellate mass in the left breast. A targeted, high resolution left breast ultrasound demonstrates intense acoustic shadowing t the 6 o'clock position in the left breast from the 1.2 mass. Mammographic appearance of the right breast is stable. BI-RADS category 4-suspicious for malignancy. Further evaluation is needed. 4/15/21 US left breast including axilla Candance Blood): There is a focal area of acoustic shadowing identified at the 6 o'clock position in the left breast corresponding to the stellate mass seen on mammogram. There is so much shadowing it is difficult to tell what is mass versus acoustic shadowing. My best estimate of diameter is 1.2cm. Color Doppler demonstrates little to no blood flow to the mass. This is highly suspicious appearing. BI-RADS category 4, suspicious. Percutaneous biopsy is recommended. 4/26/21- Breast, left breast needle core biopsies at 5 o'clock position: Infiltrating ductal carcinoma, no special type, favor grade 1. Infiltrating carcinoma measures 0.8 cm in greatest linear dimension and is present in multiple cores. ER 70%, NM 77%, Her-2 ramesh-negative, Ki67 8%-low. Cytology: Lymph node, left axillary lymph node fine-needle aspiration, smears and ThinPrep: Polytypic small round lymphocytes, negative for evidence of metastasis. 5/10/21 MRI bilateral breast: Right breast with a 0.9 cm focus of enhancement as described above. Recommend second look ultrasound. Redemonstration of known left breast cancer, with a greater size of enhancement than appreciated by ultrasound. BI-RADS CATEGORY 0: NEED ADDITIONAL EVALUATION AND/OR PRIOR MAMMOGRAMS FOR COMPARISON. Management Recommendation: Recall for additional imaging.    5/17/21 US right breast: Suspicious right breast nodule in patient 21 Oncotype DX recurrence score: 6  7/3/2021-she was first seen by Dr Trina Wilkes. Essentially, stage I, low-grade disease. Tumor measured 1.5 cm. Oncotype DX recommended. Refer to radiation oncology. We will switch her to a letrozole. Bone mineral density recommended  2021-Oncotype DX results discussed. No need for adjuvant chemotherapy. Continue radiation. Continue letrozole after radiation as adjuvant treatment for 5 years. Bone density reviewed. Osteopenia. Continue calcium vitamin D.  2021-2021-Completed XRT 4256 cGy in 16 fractions to Infiltrating ductal carcinoma to left breast  2021 Left mammogram- New post lumpectomy changes in the left breast without evidence of residual disease. 2022 Bilateral mammogram- no mammographic evidence of malignancy    Age-appropriate health screenin2017 Colonoscopy [facility])- hyperplastic polyp; 5 year recall  21 Bone density Cindi Body): Osteopenia. Left femur neck T-score -1.5.     Past Medical History:    Past Medical History:   Diagnosis Date    Breast cancer ([address] 2021     Left IDC    Cancer ([facility]     breast    Gout     Hyperlipidemia     Hypertension     Osteoarthritis     Squamous cell carcinoma of face     Vertigo        Past Surgical History:    Past Surgical History:   Procedure Laterality Date    ANUS SURGERY      polyp removed    BREAST BIOPSY      Right-benign    BREAST LUMPECTOMY Left 2021    LEFT LUMPECTOMY AND SNB, PREOP US AND INTRAOP US GUIDED NEEDLE LOC, BIOSORB, FLAPS, MARGIN PRBE, BILATERAL PEC BLOCK performed by Radha Dewey MD at [address] [place] [address] Right 2021    EXCISION RIGHT BREAST LESION WITH NEEDLE LOCALIZATION performed by Radha Dewey MD at [address] [place] [address] ([facility])      HYSTERECTOMY (CERVIX STATUS UNKNOWN)  2013    KNEE SURGERY      bilateral replacement    RECTAL SURGERY  2012    Rigid proctoscopy with transanal excision of rectal polyp    SKIN CANCER EXCISION  04/2022    TUBAL LIGATION  1984     BREAST NEEDLE BIOPSY LEFT Left 04/26/2021    US BREAST NEEDLE BIOPSY LEFT 4/26/2021 [facility] GENERAL SURGERY     BREAST NEEDLE BIOPSY RIGHT Right 06/02/2021     BREAST NEEDLE BIOPSY RIGHT 6/2/2021 [facility] GENERAL SURGERY       Current Medications:    Current Outpatient Medications   Medication Sig Dispense Refill    metoprolol tartrate (LOPRESSOR) 25 MG tablet Take 1 tablet by mouth twice daily 180 tablet 0    allopurinol (ZYLOPRIM) 100 MG tablet Take 2 tablets by mouth once daily 180 tablet 0    lisinopril (PRINIVIL;ZESTRIL) 40 MG tablet Take 1 tablet by mouth once daily 90 tablet 0    lovastatin (MEVACOR) 40 MG tablet Take 1 tablet by mouth nightly 90 tablet 0    letrozole (FEMARA) 2.5 MG tablet Take 1 tablet by mouth daily 90 tablet 5    montelukast (SINGULAIR) 10 MG tablet Take 1 tablet by mouth daily 90 tablet 3    Calcium Carbonate-Vit D-Min (CALCIUM 1200) 6310-5529 MG-UNIT CHEW Take 1 tablet by mouth daily       Compression Stockings MISC by Does not apply route Low to medium compression stocking just below knee 1 each 0    multivitamin (THERAGRAN) per tablet Take 1 tablet by mouth daily. aspirin 81 MG EC tablet Take 81 mg by mouth daily. fish oil-omega-3 fatty acids 1000 MG capsule Take 1 g by mouth daily        No current facility-administered medications for this visit.         Allergies: No Known Allergies    Social History:    Social History     Tobacco Use    Smoking status: Never    Smokeless tobacco: Never   Vaping Use    Vaping Use: Never used   Substance Use Topics    Alcohol use: No    Drug use: No       Family History:   Family History   Problem Relation Age of Onset    Heart Disease Mother     High Blood Pressure Mother     Heart Disease Father     High Blood Pressure Father     Ovarian Cancer Sister 79        Ovarian cancer    Cancer Maternal Aunt 60        Ovarian cancer    Cancer Sister 79        squamous cell Pulmonary:      Effort: Pulmonary effort is normal. No respiratory distress. Breath sounds: Normal breath sounds. No wheezing or rales. Chest:      Chest wall: No tenderness. Abdominal:      General: Bowel sounds are normal. There is no distension. Palpations: Abdomen is soft. There is no mass. Tenderness: There is no abdominal tenderness. There is no guarding. Musculoskeletal:         General: No tenderness or deformity. Cervical back: Normal range of motion and neck supple. Comments: Range of motion within normal limits x4 extremities   Skin:     General: Skin is warm. Findings: No bruising, erythema or rash. Neurological:      Mental Status: She is alert and oriented to person, place, and time. Cranial Nerves: No cranial nerve deficit. Coordination: Coordination normal.   Psychiatric:         Behavior: Behavior normal.         Thought Content: Thought content normal.       Labs reviewed today:  Lab Results   Component Value Date    WBC 5.75 09/16/2022    HGB 14.0 09/16/2022    HCT 43.9 09/16/2022    MCV 92.0 09/16/2022     (L) 09/16/2022     Lab Results   Component Value Date    NEUTROABS 3.86 09/16/2022       ASSESSMENT/PLAN:      1. Infiltrating ductal carcinoma of the left breast, ER/MA positive, HER2/ramesh negative, grade 1, 4/26/2021. She is status post left lumpectomy with sentinel lymph node biopsy and adjuvant radiation therapy as of 9/1/2021. Currently taking adjuvant endocrine therapy with letrozole 2.5 mg daily. Reports compliant with letrozole 2.5 mg daily. Denies any new breast complaints. Mariana Ladcassia was seen in scheduled follow-up by Dr Charito Tobias on 06/16/2022, I reviewed the progress note from that office visit which documented bilateral breast exam with no new findings or concerns of recurrence.       06/08/2022 Bilateral mammogram- no mammographic evidence of malignancy    Bilateral breast examination today revealed no dominant masses, no skin or nipple changes and no axillary adenopathy. No suspicious abnormality to suggest recurrent breast cancer. Left breast tenderness, chronic with no change.    -Continue letrozole 2.5 mg daily for anticipated 5 years, July 2026  -Encourage self breast examinations  -Keep follow-up appointment with Dr. Yanira Massey in November, 2022 as scheduled  -Keep follow-up appointment with Dr Nestor Razo on 12/19/2022      2. Encounter for monitoring aromatase inhibitor therapy  -Denies hot flashes  -Reports chronic arthralgias stable with no significant change from previous evaluation or since initiating letrozole    3. Lymphedema of upper extremity, currently not wearing compression sleeve. No signs of lymphedema noted. -Encouraged compliance with sleeve and completing exercises  -Follow-up with lymphedema clinic as recommended    4. Osteopenia of left femoral neck, July 2021. Osteopenia. Left femur neck T-score -1.5 reports compliant with Viactiv and multivitamin for supplements.  -Continue taking Viactive and multivitamin: Recommendation is for calcium 9741-6914 mg with vitamin D 1000 units daily  -Repeat bone mineral density study in July 2023  -Check vitamin D and CMP reviewed today     5. Survivorship and health maintenance recommendations  Keep a healthy body weight. Dietary recommendations include limit energy intake from total fats and sugars; increase consumption of fruit and vegetables, as well as legumes, whole grains and nuts. Engage in regular physical activity (150 minutes spread through the week). Other recommendations include minimizing alcohol intake, avoid use of tobacco products. Practice sun safety: Utilize a sunscreen with an SPF of at least 27. Avoiding tanning beds. Ensure adequate amount of sleep.       Follow-up with primary care regularly and for further recommendations regarding age-appropriate screening for cancer, wellbeing visit (preventive measures), follow-up and treatment for other medical comorbidities. I discussed all of the above findings included in the assessment and plan with the patient and the patient is in agreement to move forward with current recommendations/treatment. I have addressed all of their questions and concerns that were verbalized. FOLLOW UP:  Follow up given for 6 months or sooner, if needed  Continue to follow with other medical providers as recommended  Labs at next visit: CBC, CMP and vitamin D level if not recently evaluated    EMR Dragon/Transcription disclaimer:   Much of this encounter note is an electronic transcription/translation of spoken language to printed text. The electronic translation of spoken language may permit erroneous, or at times, nonsensical words or phrases to be inadvertently transcribed; although attempts have made to review the note for such errors, some may still exist.  Please excuse any unrecognized transcription errors and contact us if the error is unintelligible or needs documented correction. Also, portions of this note have been copied forward, however, changed to reflect the most current clinical status of this patient. Electronically signed by BETZY Bell on 9/20/2022 at 6:16 PM  Alexandra YANEZ am pre-charting as a registered nurse for BETZY Almanzar.

## 2022-09-16 ENCOUNTER — OFFICE VISIT (OUTPATIENT)
Dept: PRIMARY CARE CLINIC | Age: 77
End: 2022-09-16
Payer: MEDICARE

## 2022-09-16 ENCOUNTER — OFFICE VISIT (OUTPATIENT)
Dept: HEMATOLOGY | Age: 77
End: 2022-09-16
Payer: MEDICARE

## 2022-09-16 ENCOUNTER — HOSPITAL ENCOUNTER (OUTPATIENT)
Dept: INFUSION THERAPY | Age: 77
Discharge: HOME | End: 2022-09-16
Payer: MEDICARE

## 2022-09-16 VITALS
TEMPERATURE: 97.2 F | HEIGHT: 60 IN | WEIGHT: 185.4 LBS | BODY MASS INDEX: 36.4 KG/M2 | HEART RATE: 63 BPM | OXYGEN SATURATION: 96 %

## 2022-09-16 VITALS
BODY MASS INDEX: 36.34 KG/M2 | OXYGEN SATURATION: 96 % | HEART RATE: 60 BPM | SYSTOLIC BLOOD PRESSURE: 160 MMHG | HEIGHT: 60 IN | WEIGHT: 185.1 LBS | DIASTOLIC BLOOD PRESSURE: 110 MMHG

## 2022-09-16 DIAGNOSIS — Z00.00 MEDICARE ANNUAL WELLNESS VISIT, SUBSEQUENT: Primary | ICD-10-CM

## 2022-09-16 DIAGNOSIS — Z79.811 ENCOUNTER FOR MONITORING AROMATASE INHIBITOR THERAPY: ICD-10-CM

## 2022-09-16 DIAGNOSIS — Z51.81 ENCOUNTER FOR MONITORING AROMATASE INHIBITOR THERAPY: ICD-10-CM

## 2022-09-16 DIAGNOSIS — C50.912 INFILTRATING DUCTAL CARCINOMA OF BREAST, LEFT (HCC): ICD-10-CM

## 2022-09-16 DIAGNOSIS — C50.912 INFILTRATING DUCTAL CARCINOMA OF BREAST, LEFT (HCC): Primary | ICD-10-CM

## 2022-09-16 DIAGNOSIS — M85.852 OSTEOPENIA OF LEFT FEMORAL NECK: ICD-10-CM

## 2022-09-16 DIAGNOSIS — I89.0 LYMPHEDEMA OF UPPER EXTREMITY: ICD-10-CM

## 2022-09-16 DIAGNOSIS — E55.9 VITAMIN D DEFICIENCY: ICD-10-CM

## 2022-09-16 LAB
ALBUMIN SERPL-MCNC: 4.5 G/DL (ref 3.5–5.2)
ALP BLD-CCNC: 89 U/L (ref 35–104)
ALT SERPL-CCNC: 36 U/L (ref 9–52)
ANION GAP SERPL CALCULATED.3IONS-SCNC: 12 MMOL/L (ref 7–19)
AST SERPL-CCNC: 39 U/L (ref 14–36)
BASOPHILS ABSOLUTE: 0.02 K/UL (ref 0.01–0.08)
BASOPHILS RELATIVE PERCENT: 0.3 % (ref 0.1–1.2)
BILIRUB SERPL-MCNC: 0.8 MG/DL (ref 0.2–1.3)
BUN BLDV-MCNC: 21 MG/DL (ref 7–17)
CALCIUM SERPL-MCNC: 10.4 MG/DL (ref 8.4–10.2)
CHLORIDE BLD-SCNC: 98 MMOL/L (ref 98–111)
CO2: 33 MMOL/L (ref 22–29)
CREAT SERPL-MCNC: 0.7 MG/DL (ref 0.5–1)
EOSINOPHILS ABSOLUTE: 0.13 K/UL (ref 0.04–0.54)
EOSINOPHILS RELATIVE PERCENT: 2.3 % (ref 0.7–7)
GFR NON-AFRICAN AMERICAN: >60
GLOBULIN: 3 G/DL
GLUCOSE BLD-MCNC: 100 MG/DL (ref 74–106)
HCT VFR BLD CALC: 43.9 % (ref 34.1–44.9)
HEMOGLOBIN: 14 G/DL (ref 11.2–15.7)
LYMPHOCYTES ABSOLUTE: 1.27 K/UL (ref 1.18–3.74)
LYMPHOCYTES RELATIVE PERCENT: 22.1 % (ref 19.3–53.1)
MCH RBC QN AUTO: 29.4 PG (ref 25.6–32.2)
MCHC RBC AUTO-ENTMCNC: 31.9 G/DL (ref 32.3–35.5)
MCV RBC AUTO: 92 FL (ref 79.4–94.8)
MONOCYTES ABSOLUTE: 0.44 K/UL (ref 0.24–0.82)
MONOCYTES RELATIVE PERCENT: 7.7 % (ref 4.7–12.5)
NEUTROPHILS ABSOLUTE: 3.86 K/UL (ref 1.56–6.13)
NEUTROPHILS RELATIVE PERCENT: 67.1 % (ref 34–71.1)
PDW BLD-RTO: 14.1 % (ref 11.7–14.4)
PLATELET # BLD: 153 K/UL (ref 182–369)
PMV BLD AUTO: 10.7 FL (ref 7.4–10.4)
POTASSIUM SERPL-SCNC: 4.8 MMOL/L (ref 3.5–5.1)
RBC # BLD: 4.77 M/UL (ref 3.93–5.22)
SODIUM BLD-SCNC: 143 MMOL/L (ref 137–145)
TOTAL PROTEIN: 7.5 G/DL (ref 6.3–8.2)
VITAMIN D 25-HYDROXY: 26.2 NG/ML
WBC # BLD: 5.75 K/UL (ref 3.98–10.04)

## 2022-09-16 PROCEDURE — G8427 DOCREV CUR MEDS BY ELIG CLIN: HCPCS | Performed by: NURSE PRACTITIONER

## 2022-09-16 PROCEDURE — 36415 COLL VENOUS BLD VENIPUNCTURE: CPT

## 2022-09-16 PROCEDURE — 85025 COMPLETE CBC W/AUTO DIFF WBC: CPT

## 2022-09-16 PROCEDURE — 1036F TOBACCO NON-USER: CPT | Performed by: NURSE PRACTITIONER

## 2022-09-16 PROCEDURE — G8400 PT W/DXA NO RESULTS DOC: HCPCS | Performed by: NURSE PRACTITIONER

## 2022-09-16 PROCEDURE — G8417 CALC BMI ABV UP PARAM F/U: HCPCS | Performed by: NURSE PRACTITIONER

## 2022-09-16 PROCEDURE — 99214 OFFICE O/P EST MOD 30 MIN: CPT | Performed by: NURSE PRACTITIONER

## 2022-09-16 PROCEDURE — G0439 PPPS, SUBSEQ VISIT: HCPCS | Performed by: NURSE PRACTITIONER

## 2022-09-16 PROCEDURE — 1090F PRES/ABSN URINE INCON ASSESS: CPT | Performed by: NURSE PRACTITIONER

## 2022-09-16 PROCEDURE — 1123F ACP DISCUSS/DSCN MKR DOCD: CPT | Performed by: NURSE PRACTITIONER

## 2022-09-16 PROCEDURE — 80053 COMPREHEN METABOLIC PANEL: CPT

## 2022-09-16 PROCEDURE — 99212 OFFICE O/P EST SF 10 MIN: CPT

## 2022-09-16 NOTE — PROGRESS NOTES
Medicare Annual Wellness Visit    Beni Leija is here for Medicare AWV    Assessment & Plan   Medicare annual wellness visit, subsequent    Recommendations for Preventive Services Due: see orders and patient instructions/AVS.  Recommended screening schedule for the next 5-10 years is provided to the patient in written form: see Patient Instructions/AVS.     Return for Medicare Annual Wellness Visit in 1 year. Subjective       Patient's complete Health Risk Assessment and screening values have been reviewed and are found in Flowsheets. The following problems were reviewed today and where indicated follow up appointments were made and/or referrals ordered.     Positive Risk Factor Screenings with Interventions:             General Health and ACP:  General  In general, how would you say your health is?: Good  In the past 7 days, have you experienced any of the following: New or Increased Pain, New or Increased Fatigue, Loneliness, Social Isolation, Stress or Anger?: No  Do you get the social and emotional support that you need?: Yes  Do you have a Living Will?: (!) No    Advance Directives       Power of  Living Will ACP-Advance Directive ACP-Power of     Not on File Not on File Not on File Not on File          General Health Risk Interventions:  No Living Will: [address] addressed with patient today    Health Habits/Nutrition:  Physical Activity: Sufficiently Active    Days of Exercise per Week: 6 days    Minutes of Exercise per Session: 30 min     Have you lost any weight without trying in the past 3 months?: No  Body mass index: (!) 36.51  Have you seen the dentist within the past year?: Yes  Health Habits/Nutrition Interventions:  Inadequate physical activity:  patient agrees to exercise for at least 150 minutes/week     Safety:  Do you have working smoke detectors?: Yes  Do you have any tripping hazards - loose or unsecured carpets or rugs?: No  Do you have any tripping hazards - clutter in doorways, halls, or stairs?: No  Do you have either shower bars, grab bars, non-slip mats or non-slip surfaces in your shower or bathtub?: Yes  Do all of your stairways have a railing or banister?: (!) No  Do you always fasten your seatbelt when you are in a car?: Yes  Safety Interventions:  Home safety tips provided           Objective   Vitals:    09/16/22 0846   Pulse: 63   Temp: 97.2 °F (36.2 °C)   TempSrc: Temporal   SpO2: 96%   Weight: 185 lb 6.4 oz (84.1 kg)   Height: 4' 11.75\" (1.518 m)      Body mass index is 36.51 kg/m². General Appearance: alert and oriented to person, place and time, well developed and well- nourished, in no acute distress  Skin: warm and dry, no rash or erythema  Head: normocephalic and atraumatic  Eyes: pupils equal, round, and reactive to light, extraocular eye movements intact, conjunctivae normal  ENT: tympanic membrane, external ear and ear canal normal bilaterally, nose without deformity, nasal mucosa and turbinates normal without polyps  Neck: supple and non-tender without mass, no thyromegaly or thyroid nodules, no cervical lymphadenopathy  Pulmonary/Chest: clear to auscultation bilaterally- no wheezes, rales or rhonchi, normal air movement, no respiratory distress  Cardiovascular: normal rate, regular rhythm, normal S1 and S2, no murmurs, rubs, clicks, or gallops, distal pulses intact, no carotid bruits  Abdomen: soft, non-tender, non-distended, normal bowel sounds, no masses or organomegaly  Extremities: no cyanosis, clubbing or edema  Musculoskeletal: normal range of motion, no joint swelling, deformity or tenderness  Neurologic: reflexes normal and symmetric, no cranial nerve deficit, gait, coordination and speech normal       No Known Allergies  Prior to Visit Medications    Medication Sig Taking?  Authorizing Provider   metoprolol tartrate (LOPRESSOR) 25 MG tablet Take 1 tablet by mouth twice daily Yes Yared Broussard, APRN - CNP   allopurinol (ZYLOPRIM) 100 MG tablet Take 2 tablets by mouth once daily Yes BETZY Ling CNP   lisinopril (PRINIVIL;ZESTRIL) 40 MG tablet Take 1 tablet by mouth once daily Yes BETZY Ling CNP   lovastatin (MEVACOR) 40 MG tablet Take 1 tablet by mouth nightly Yes BETZY Ling CNP   montelukast (SINGULAIR) 10 MG tablet Take 1 tablet by mouth daily Yes Mena Mackay MD   Calcium Carbonate-Vit D-Min (CALCIUM 1200) 9282-6440 MG-UNIT CHEW Take 1 tablet by mouth daily  Yes Historical Provider, MD   Compression Stockings MISC by Does not apply route Low to medium compression stocking just below knee Yes Isabel Simms MD   multivitamin [facility]) per tablet Take 1 tablet by mouth daily. Yes Historical Provider, MD   aspirin 81 MG EC tablet Take 81 mg by mouth daily.    Yes Historical Provider, MD   fish oil-omega-3 fatty acids 1000 MG capsule Take 1 g by mouth daily  Yes Historical Provider, MD   letrozole ([address] [facility]) 2.5 MG tablet Take 1 tablet by mouth daily  BETZY Amin (Including outside providers/suppliers regularly involved in providing care):   Patient Care Team:  BETZY Ling CNP as PCP - General (Nurse Practitioner Family)  BETZY Ling CNP as PCP - [facility] Empaneled Provider     Reviewed and updated this visit:  Tobacco  Allergies  Meds  Med Hx  Surg Hx  Soc Hx  Fam Hx

## 2022-09-16 NOTE — PATIENT INSTRUCTIONS
Personalized Preventive Plan for Timothy Frederick - 9/16/2022  Medicare offers a range of preventive health benefits. Some of the tests and screenings are paid in full while other may be subject to a deductible, co-insurance, and/or copay. Some of these benefits include a comprehensive review of your medical history including lifestyle, illnesses that may run in your family, and various assessments and screenings as appropriate. After reviewing your medical record and screening and assessments performed today your provider may have ordered immunizations, labs, imaging, and/or referrals for you. A list of these orders (if applicable) as well as your Preventive Care list are included within your After Visit Summary for your review. Other Preventive Recommendations:    A preventive eye exam performed by an eye specialist is recommended every 1-2 years to screen for glaucoma; cataracts, macular degeneration, and other eye disorders. A preventive dental visit is recommended every 6 months. Try to get at least 150 minutes of exercise per week or 10,000 steps per day on a pedometer . Order or download the FREE \"Exercise & Physical Activity: Your Everyday Guide\" from [employer]. Call 3-614.186.6456 or search [employer] online. You need 4628-2807 mg of calcium and 6445-2288 IU of vitamin D per day. It is possible to meet your calcium requirement with diet alone, but a vitamin D supplement is usually necessary to meet this goal.  When exposed to the sun, use a sunscreen that protects against both UVA and UVB radiation with an SPF of 30 or greater. Reapply every 2 to 3 hours or after sweating, drying off with a towel, or swimming. Always wear a seat belt when traveling in a car. Always wear a helmet when riding a bicycle or motorcycle.

## 2022-09-20 ENCOUNTER — TELEPHONE (OUTPATIENT)
Dept: HEMATOLOGY | Age: 77
End: 2022-09-20

## 2022-09-20 ASSESSMENT — ENCOUNTER SYMPTOMS
BLOOD IN STOOL: 0
BACK PAIN: 0
GASTROINTESTINAL NEGATIVE: 1
CONSTIPATION: 0
EYE REDNESS: 0
ABDOMINAL PAIN: 0
NAUSEA: 0
DIARRHEA: 0
EYES NEGATIVE: 1
EYE PAIN: 0
VOMITING: 0
COUGH: 0
SORE THROAT: 0
RESPIRATORY NEGATIVE: 1
SHORTNESS OF BREATH: 0
EYE DISCHARGE: 0
WHEEZING: 0

## 2022-09-20 NOTE — TELEPHONE ENCOUNTER
Take vitamin D 1000 units daily, she is currently taking calcium with vitamin D. Repeat her vitamin D level at follow-up appointment. Pt verbalizes understanding.

## 2022-09-22 ENCOUNTER — OFFICE VISIT (OUTPATIENT)
Dept: GASTROENTEROLOGY | Facility: CLINIC | Age: 77
End: 2022-09-22

## 2022-09-22 VITALS
DIASTOLIC BLOOD PRESSURE: 80 MMHG | BODY MASS INDEX: 36.52 KG/M2 | HEIGHT: 60 IN | OXYGEN SATURATION: 97 % | SYSTOLIC BLOOD PRESSURE: 132 MMHG | TEMPERATURE: 97.2 F | WEIGHT: 186 LBS | HEART RATE: 52 BPM

## 2022-09-22 DIAGNOSIS — Z83.71 FAMILY HISTORY OF POLYPS IN THE COLON: Primary | ICD-10-CM

## 2022-09-22 DIAGNOSIS — K63.5 POLYP OF COLON, UNSPECIFIED PART OF COLON, UNSPECIFIED TYPE: ICD-10-CM

## 2022-09-22 PROCEDURE — S0260 H&P FOR SURGERY: HCPCS | Performed by: NURSE PRACTITIONER

## 2022-09-22 RX ORDER — SODIUM, POTASSIUM,MAG SULFATES 17.5-3.13G
2 SOLUTION, RECONSTITUTED, ORAL ORAL EVERY 12 HOURS
Qty: 354 ML | Refills: 0 | Status: SHIPPED | OUTPATIENT
Start: 2022-09-22 | End: 2022-10-07 | Stop reason: HOSPADM

## 2022-09-22 NOTE — PROGRESS NOTES
Primary Physician: Elsi Michaels APRN    Chief Complaint   Patient presents with   • Colon Cancer Screening     Pt presents today for colon recall-last colon was 8/30/2017; Personal history of hyperplastic polyps; Family history of colon polyps       Subjective     Charity Meek is a 77 y.o. female.    HPI   Personal Hx Colon Polyps-  No change in bowels.  No bright red blood in stools.  No abd pain.  No diarrhea or constipation.  She has had polyp on last colonoscopy in 2017.  Sister had colon polyps.    Past Medical History:   Diagnosis Date   • DJD (degenerative joint disease)    • Family history of colonic polyps    • History of colon polyps    • HTN (hypertension)    • Hyperlipemia        Past Surgical History:   Procedure Laterality Date   • BREAST BIOPSY     • BREAST LUMPECTOMY Left 06/04/2021    [facility]   • COLONOSCOPY N/A 08/30/2017    One 5mm hyperplastic polyp in the transverse colon; The examination was otherwise normal on direct and retroflexion views; Repeat 5 years   • COLONOSCOPY  06/18/2012    Polypoid lesion at the dentate line-anterior wall at the anus; Otherwise normal; Repeat 5 years   • COLONOSCOPY  05/12/2008    Anal polypoid lesion; Otherwise normal; Repeat 4 years   • COLONOSCOPY  11/15/2004    Internal hemorrhoids; Otherwise normal; Repeat 3 years   • HYSTERECTOMY     • OTHER SURGICAL HISTORY  07/2008    Anal fibroma removal   • REPLACEMENT TOTAL KNEE Bilateral    • SKIN CANCER EXCISION      Squamous cell excision from face        Current Outpatient Medications:   •  allopurinol (ZYLOPRIM) 100 MG tablet, Take 2 tablets by mouth Daily., Disp: , Rfl:   •  aspirin 81 MG EC tablet, Take 1 tablet by mouth Daily., Disp: , Rfl:   •  Calcium Carbonate-Vit D-Min (Calcium 1200) 4535-7653 MG-UNIT chewable tablet, Chew 1 tablet Daily., Disp: , Rfl:   •  letrozole (FEMARA) 2.5 MG tablet, Take 2.5 mg by mouth Daily., Disp: , Rfl:   •  lisinopril (PRINIVIL,ZESTRIL) 40 MG tablet, Take 40 mg by mouth  "Daily., Disp: , Rfl:   •  lovastatin (MEVACOR) 40 MG tablet, Take 40 mg by mouth Every Night., Disp: , Rfl:   •  metoprolol tartrate (LOPRESSOR) 25 MG tablet, Take 1 tablet by mouth 2 (Two) Times a Day., Disp: , Rfl:   •  montelukast (SINGULAIR) 10 MG tablet, Take 1 tablet by mouth Daily., Disp: , Rfl:   •  multivitamin with minerals tablet tablet, Take 1 tablet by mouth Daily., Disp: , Rfl:   •  Omega-3 Fatty Acids (FISH OIL) 1000 MG capsule capsule, Take 1,000 mg by mouth Daily With Breakfast., Disp: , Rfl:   •  Vitamin D, Cholecalciferol, 25 MCG (1000 UT) capsule, Take 1,000 Units by mouth Daily., Disp: , Rfl:   •  sodium-potassium-magnesium sulfates (Suprep Bowel Prep Kit) 17.5-3.13-1.6 GM/177ML solution oral solution, Take 2 bottles by mouth Every 12 (Twelve) Hours. Take split dose, as directed by GI office, Disp: 354 mL, Rfl: 0    No Known Allergies    Social History     Socioeconomic History   • Marital status:    Tobacco Use   • Smoking status: Never Smoker   • Smokeless tobacco: Never Used   Vaping Use   • Vaping Use: Never used   Substance and Sexual Activity   • Alcohol use: Not Currently   • Drug use: No   • Sexual activity: Defer       Family History   Problem Relation Age of Onset   • Colon polyps Sister         Unknown age   • Cancer Sister    • Colon cancer Neg Hx    • Esophageal cancer Neg Hx    • Liver cancer Neg Hx    • Liver disease Neg Hx    • Rectal cancer Neg Hx    • Stomach cancer Neg Hx        Review of Systems   Respiratory: Negative for shortness of breath.    Cardiovascular: Negative for chest pain.       Objective     /80 (BP Location: Left arm, Patient Position: Sitting, Cuff Size: Adult)   Pulse 52   Temp 97.2 °F (36.2 °C) (Infrared)   Ht 152.4 cm (60\")   Wt 84.4 kg (186 lb)   SpO2 97%   Breastfeeding No   BMI 36.33 kg/m²     Physical Exam  Vitals reviewed.   Constitutional:       Appearance: Normal appearance.   Cardiovascular:      Rate and Rhythm: Normal rate and " regular rhythm.   Pulmonary:      Effort: Pulmonary effort is normal.      Breath sounds: Normal breath sounds.   Abdominal:      General: Abdomen is flat. Bowel sounds are normal.      Palpations: Abdomen is soft.   Neurological:      Mental Status: She is alert.         Lab Results - Last 18 Months   Lab Units 09/16/22  1215 07/18/22  0740 07/30/21  0928 06/01/21  1359   GLUCOSE mg/dL 100 100 83 84   BUN mg/dL 21* 17 16 17   CREATININE mg/dL 0.7 0.7 0.7 0.6   SODIUM mmol/L 143 140 140 138   POTASSIUM mmol/L 4.8 4.6 4.6 4.4   CHLORIDE mmol/L 98 102 100 100   TOTAL CO2 mmol/L 33* 29 29 28   TOTAL PROTEIN g/dL 7.5 7.0 6.8  --    ALBUMIN g/dL 4.5 3.9 4.1  --    ALT (SGPT) U/L 36 21 19  --    AST (SGOT) U/L 39* 25 23  --    ALK PHOS U/L 89 96 63  --    BILIRUBIN mg/dL 0.8 0.5 0.5  --    GLOBULIN g/dL 3  --   --   --        Lab Results - Last 18 Months   Lab Units 09/16/22  0953 07/18/22  0740 05/27/22  0944 11/30/21  1120 07/30/21  1048 07/30/21  0928   HEMOGLOBIN g/dL 14.0 14.0 13.5 13.5 13.8 14.1   HEMATOCRIT % 43.9 43.1 42.9 39.3 40.3 42.7   MCV fL 92.0 90.2 94.3 93.8 91.2 93.4   WBC K/uL 5.75 6.7 6.47 5.19 7.14 6.0   RDW % 14.1 13.6 13.3 12.5 12.4 12.9   MPV fL 10.7* 10.6 10.3 10.6* 10.3 10.8   PLATELETS K/uL 153* 181 160* 121* 140* 168       Lab Results - Last 18 Months   Lab Units 09/16/22  1024 07/18/22  0740 07/30/21  0928   TSH uIU/mL  --  1.510 2.050   VIT D 25 HYDROXY ng/mL 26.2*  --   --               IMPRESSION/PLAN:    Assessment & Plan      Problem List Items Addressed This Visit        Family History    Family history of polyps in the colon - Primary    Overview     sister            Gastrointestinal Abdominal     Colon polyps    Relevant Orders    Case Request (Completed)                  The risks, benefits, and alternatives of colonoscopy were reviewed with the patient today.  Risks including perforation of the colon possibly requiring surgery or colostomy.  Additional risks include risk of bleeding  from biopsies or removal of colon tissue.  There is also the risk of a drug reaction or problems with anesthesia.  This will be discussed with the further by the anesthesia team on the day of the procedure.  Lastly there is a possibility of missing a colon polyp or cancer.  The benefits include the diagnosis and management of disease of the colon and rectum.  Alternatives to colonoscopy include barium enema, laboratory testing, radiographic evaluation, or no intervention.  The patient verbalizes understanding and agrees.    In accordance with requirements under the Affordable Care Act, [facility] has provided pricing for all hospital services and items on each of its websites. However, a patient's actual cost may differ based on the services the patient receives to meet individual healthcare needs and based on the benefits provided under the patient’s insurance coverage.        Sharon Siddiqui, APRN  09/22/22  10:59 CDT    Much of this encounter note is an electronic transcription/translation of spoken language to printed text. The electronic translation of spoken language may permit erroneous, or at times, nonsensical words or phrases to be inadvertently transcribed; although I have reviewed the note for such errors, some may still exist.

## 2022-09-29 ENCOUNTER — TELEPHONE (OUTPATIENT)
Dept: GASTROENTEROLOGY | Facility: CLINIC | Age: 77
End: 2022-09-29

## 2022-10-07 ENCOUNTER — ANESTHESIA (OUTPATIENT)
Dept: GASTROENTEROLOGY | Facility: HOSPITAL | Age: 77
End: 2022-10-07

## 2022-10-07 ENCOUNTER — TELEPHONE (OUTPATIENT)
Dept: GASTROENTEROLOGY | Facility: CLINIC | Age: 77
End: 2022-10-07

## 2022-10-07 ENCOUNTER — HOSPITAL ENCOUNTER (OUTPATIENT)
Facility: HOSPITAL | Age: 77
Setting detail: HOSPITAL OUTPATIENT SURGERY
Discharge: HOME | End: 2022-10-07
Attending: INTERNAL MEDICINE | Admitting: INTERNAL MEDICINE

## 2022-10-07 ENCOUNTER — ANESTHESIA EVENT (OUTPATIENT)
Dept: GASTROENTEROLOGY | Facility: HOSPITAL | Age: 77
End: 2022-10-07

## 2022-10-07 VITALS
DIASTOLIC BLOOD PRESSURE: 70 MMHG | WEIGHT: 182 LBS | BODY MASS INDEX: 35.73 KG/M2 | TEMPERATURE: 97 F | SYSTOLIC BLOOD PRESSURE: 144 MMHG | RESPIRATION RATE: 14 BRPM | HEART RATE: 67 BPM | HEIGHT: 60 IN | OXYGEN SATURATION: 98 %

## 2022-10-07 DIAGNOSIS — K63.5 POLYP OF COLON, UNSPECIFIED PART OF COLON, UNSPECIFIED TYPE: ICD-10-CM

## 2022-10-07 PROCEDURE — G0105 COLORECTAL SCRN; HI RISK IND: HCPCS | Performed by: INTERNAL MEDICINE

## 2022-10-07 PROCEDURE — 25010000002 PROPOFOL 10 MG/ML EMULSION: Performed by: NURSE ANESTHETIST, CERTIFIED REGISTERED

## 2022-10-07 RX ORDER — SODIUM CHLORIDE 9 MG/ML
500 INJECTION, SOLUTION INTRAVENOUS CONTINUOUS PRN
Status: DISCONTINUED | OUTPATIENT
Start: 2022-10-07 | End: 2022-10-07 | Stop reason: HOSPADM

## 2022-10-07 RX ORDER — SODIUM CHLORIDE 0.9 % (FLUSH) 0.9 %
10 SYRINGE (ML) INJECTION AS NEEDED
Status: DISCONTINUED | OUTPATIENT
Start: 2022-10-07 | End: 2022-10-07 | Stop reason: HOSPADM

## 2022-10-07 RX ORDER — LIDOCAINE HYDROCHLORIDE 20 MG/ML
INJECTION, SOLUTION EPIDURAL; INFILTRATION; INTRACAUDAL; PERINEURAL AS NEEDED
Status: DISCONTINUED | OUTPATIENT
Start: 2022-10-07 | End: 2022-10-07 | Stop reason: SURG

## 2022-10-07 RX ORDER — PROPOFOL 10 MG/ML
VIAL (ML) INTRAVENOUS AS NEEDED
Status: DISCONTINUED | OUTPATIENT
Start: 2022-10-07 | End: 2022-10-07 | Stop reason: SURG

## 2022-10-07 RX ADMIN — LIDOCAINE HYDROCHLORIDE 50 MG: 20 INJECTION, SOLUTION EPIDURAL; INFILTRATION; INTRACAUDAL; PERINEURAL at 12:55

## 2022-10-07 RX ADMIN — PROPOFOL 300 MG: 10 INJECTION, EMULSION INTRAVENOUS at 12:55

## 2022-10-07 RX ADMIN — SODIUM CHLORIDE 500 ML: 9 INJECTION, SOLUTION INTRAVENOUS at 11:25

## 2022-10-07 NOTE — ANESTHESIA POSTPROCEDURE EVALUATION
Patient: Charity CAREY    Procedure Summary     Date: 10/07/22 Room / Location: [facility] ENDOSCOPY 4 / [facility]    Anesthesia Start: 1249 Anesthesia Stop: 1316    Procedure: COLONOSCOPY WITH ANESTHESIA (N/A ) Diagnosis:       Polyp of colon, unspecified part of colon, unspecified type      (Polyp of colon, unspecified part of colon, unspecified type [K63.5])    Surgeons: Viky Hu MD Provider: Soledad Burton CRNA    Anesthesia Type: MAC ASA Status: 2          Anesthesia Type: MAC    Vitals  Vitals Value Taken Time   /70 10/07/22 1331   Temp     Pulse 69 10/07/22 1332   Resp 14 10/07/22 1330   SpO2 99 % 10/07/22 1332   Vitals shown include unvalidated device data.        Post Anesthesia Care and Evaluation    Patient location during evaluation: PHASE II  Patient participation: complete - patient participated  Level of consciousness: awake and alert  Pain management: adequate    Airway patency: patent  Anesthetic complications: No anesthetic complications  PONV Status: none  Cardiovascular status: acceptable  Respiratory status: acceptable  Hydration status: acceptable  No anesthesia care post op

## 2022-10-07 NOTE — ANESTHESIA PREPROCEDURE EVALUATION
Anesthesia Evaluation     Patient summary reviewed   no history of anesthetic complications:  NPO Solid Status: > 8 hours             Airway   Mallampati: II  Dental      Pulmonary - negative pulmonary ROS   Cardiovascular   Exercise tolerance: good (4-7 METS)    (+) hypertension, hyperlipidemia,       Neuro/Psych- negative ROS  GI/Hepatic/Renal/Endo    (+) obesity,       Musculoskeletal     Abdominal    Substance History      OB/GYN          Other                        Anesthesia Plan    ASA 2     MAC       Anesthetic plan, risks, benefits, and alternatives have been provided, discussed and informed consent has been obtained with: patient.        CODE STATUS:

## 2022-10-27 DIAGNOSIS — E78.2 MIXED HYPERLIPIDEMIA: ICD-10-CM

## 2022-10-27 DIAGNOSIS — I10 ESSENTIAL HYPERTENSION: ICD-10-CM

## 2022-10-27 RX ORDER — LOVASTATIN 40 MG/1
40 TABLET ORAL NIGHTLY
Qty: 90 TABLET | Refills: 0 | Status: SHIPPED | OUTPATIENT
Start: 2022-10-27

## 2022-10-27 RX ORDER — LISINOPRIL 40 MG/1
TABLET ORAL
Qty: 90 TABLET | Refills: 0 | Status: SHIPPED | OUTPATIENT
Start: 2022-10-27

## 2022-11-03 ENCOUNTER — HOSPITAL ENCOUNTER (OUTPATIENT)
Dept: RADIATION ONCOLOGY | Facility: HOSPITAL | Age: 77
Setting detail: RADIATION/ONCOLOGY SERIES
End: 2022-11-03

## 2022-11-03 ENCOUNTER — OFFICE VISIT (OUTPATIENT)
Dept: RADIATION ONCOLOGY | Facility: HOSPITAL | Age: 77
End: 2022-11-03

## 2022-11-03 VITALS
DIASTOLIC BLOOD PRESSURE: 110 MMHG | SYSTOLIC BLOOD PRESSURE: 158 MMHG | HEIGHT: 60 IN | WEIGHT: 186 LBS | BODY MASS INDEX: 36.52 KG/M2

## 2022-11-03 DIAGNOSIS — Z98.890 S/P LYMPH NODE BIOPSY: ICD-10-CM

## 2022-11-03 DIAGNOSIS — Z17.0 MALIGNANT NEOPLASM OF LEFT BREAST IN FEMALE, ESTROGEN RECEPTOR POSITIVE, UNSPECIFIED SITE OF BREAST: Primary | ICD-10-CM

## 2022-11-03 DIAGNOSIS — Z78.9 NON-SMOKER: ICD-10-CM

## 2022-11-03 DIAGNOSIS — Z92.3 HISTORY OF RADIATION THERAPY: ICD-10-CM

## 2022-11-03 DIAGNOSIS — C50.912 MALIGNANT NEOPLASM OF LEFT BREAST IN FEMALE, ESTROGEN RECEPTOR POSITIVE, UNSPECIFIED SITE OF BREAST: Primary | ICD-10-CM

## 2022-11-03 DIAGNOSIS — Z98.890 S/P LUMPECTOMY, LEFT BREAST: ICD-10-CM

## 2022-11-03 PROCEDURE — G0463 HOSPITAL OUTPT CLINIC VISIT: HCPCS | Performed by: RADIOLOGY

## 2022-12-05 DIAGNOSIS — I10 ESSENTIAL HYPERTENSION: ICD-10-CM

## 2022-12-05 DIAGNOSIS — M1A.9XX0 CHRONIC GOUT WITHOUT TOPHUS, UNSPECIFIED CAUSE, UNSPECIFIED SITE: ICD-10-CM

## 2022-12-05 RX ORDER — ALLOPURINOL 100 MG/1
TABLET ORAL
Qty: 180 TABLET | Refills: 0 | Status: SHIPPED | OUTPATIENT
Start: 2022-12-05

## 2022-12-15 NOTE — PROGRESS NOTES
HISTORY OF PRESENT ILLNESS:    Ms. Damion Whiting presents today for a 6-month breast exam. This is following left partial mastectomy with left sentinel lymph node biopsy and right excisional biopsy on 6/4/2021. An ultrasound guided breast biopsy  on the left which revealed a 0.8 cm low grade invasive ductal carcinoma. ER is positive at 70%. NE is positive at 77%. Her2 is negative. Ki67 is low at 8%. Mammaprint is low risk luminal type A    MRI-5/10/2021  Left breast with a 2.4 x 3.5 cm irregular mass with irregular and   angular margins in the middle third, slightly lower outer breast, 5:00   position, 7 cm from the nipple on axial images. Adjacent biopsy   marker. No additional suspicious finding in the left breast.   Right breast with a 0.9 cm focus of enhancement in the middle third,   slightly upper outer breast, 11:00 position, 5.4 cm from the nipple on   sagittal images. No suspicious axillary or internal mammary adenopathy. The visualized portion of the chest and abdomen appear within normal   limits considering technique. Impression   1. Right breast with a 0.9 cm focus of enhancement as described above. Recommend second look ultrasound. 2. Redemonstration of known left breast cancer, with a greater size of   enhancement than appreciated by ultrasound. BI-RADS CATEGORY 0: NEED ADDITIONAL EVALUATION AND/OR PRIOR MAMMOGRAMS   FOR COMPARISON. Management Recommendation: Recall for additional imaging. Signed by Dr Laura Alford on 5/10/2021 3:23 PM     PATHOLOGY REVEALS:    A. Breast, right breast excisional biopsy:   1. Fibroadenoma with focal involvement by atypical ductal hyperplasia. 2.  Microfocus of atypical ductal hyperplasia identified separately from   the fibroadenoma, margins negative. B.  Breast, left lumpectomy:   1. Invasive ductal carcinoma, no special type, grade 1.   2.  Invasive carcinoma measures 1.5 cm in greatest dimension   microscopically.    3. Invasive carcinoma is located 0.4 cm from the nearest medial surgical   excision margin. 4.  Focal low-grade ductal carcinoma in situ is identified in conjunction   with the invasive lesion. 5. In situ carcinoma is located greater than 1.0 cm from all evaluated   margins. 6.  Prior biopsy site identified. 7.  Sections of skin, negative for evidence of malignancy. C.  Breast, excision of additional left breast medial margin:   1. Focal atypical ductal hyperplasia, margins negative. 2.  Negative for evidence of residual in situ or invasive carcinoma. D.  Breast, excision of additional left breast deep margin: Benign   adipose tissue and skeletal muscle. E.  Lymph nodes, left sentinel lymph node biopsies: 8 lymph nodes,   negative for evidence of malignancy. AJCC STAGE: pT1c, pN0, pMx     She has completed radiation therapy     Mammogram-6/8/2022  Findings: Stable posttreatment change in the LEFT posterior central breast with Biozorb device noted. No suspicious mass, calcifications, or architectural distortion. IMPRESSION:  1. No mammographic evidence of malignancy. Recommendation is for the patient to return for routine mammography in one year or sooner, if clinically indicated. 2. BI-RADS category 2: Benign findings   Signed by Dr Vora Cords:  The  wounds look good with no evidence of infection, fluid accumulation, or skin necrosis. She does have  appropriate radiation changes in the left breast which have improved    IMPRESSION:    Doing well s/p left partial mastectomy with left sentinel lymph node biopsy and right excisional biopsy    PLAN: Follow-up in 6 months with bilateral mammograms    I have seen, examined and reviewed this patient medication list, appropriate labs and imaging studies. I reviewed relevant medical records and others physicians notes. I discussed the plans of care with the patient.  I answered all the questions to the patients satisfaction. I, Dr Mikie Soto, personally performed the services described in this documentation as scribed by Kalyan Harris MA in my presence and is both accurate and complete. (Please note that portions of this note were completed with a voice recognition program. Efforts were made to edit the dictations but occasionally words are mis-transcribed.)  Over 50% of the total visit time of 20 minutes in face to face encounter with the patient, out of which more than 50% of the time was spent in counseling patient or family and coordination of care. Counseling included but was not limited to time spent reviewing labs, imaging studies/ treatment plan and answering questions.

## 2022-12-19 ENCOUNTER — OFFICE VISIT (OUTPATIENT)
Dept: SURGERY | Age: 77
End: 2022-12-19
Payer: MEDICARE

## 2022-12-19 VITALS — SYSTOLIC BLOOD PRESSURE: 124 MMHG | DIASTOLIC BLOOD PRESSURE: 80 MMHG | HEART RATE: 80 BPM

## 2022-12-19 DIAGNOSIS — Z98.890 STATUS POST LEFT BREAST LUMPECTOMY: ICD-10-CM

## 2022-12-19 DIAGNOSIS — Z17.0 MALIGNANT NEOPLASM OF LOWER-OUTER QUADRANT OF LEFT BREAST OF FEMALE, ESTROGEN RECEPTOR POSITIVE (HCC): Primary | Chronic | ICD-10-CM

## 2022-12-19 DIAGNOSIS — C50.512 MALIGNANT NEOPLASM OF LOWER-OUTER QUADRANT OF LEFT BREAST OF FEMALE, ESTROGEN RECEPTOR POSITIVE (HCC): Primary | Chronic | ICD-10-CM

## 2022-12-19 PROCEDURE — 3078F DIAST BP <80 MM HG: CPT | Performed by: SURGERY

## 2022-12-19 PROCEDURE — G8427 DOCREV CUR MEDS BY ELIG CLIN: HCPCS | Performed by: SURGERY

## 2022-12-19 PROCEDURE — 1090F PRES/ABSN URINE INCON ASSESS: CPT | Performed by: SURGERY

## 2022-12-19 PROCEDURE — 1036F TOBACCO NON-USER: CPT | Performed by: SURGERY

## 2022-12-19 PROCEDURE — 1123F ACP DISCUSS/DSCN MKR DOCD: CPT | Performed by: SURGERY

## 2022-12-19 PROCEDURE — G8400 PT W/DXA NO RESULTS DOC: HCPCS | Performed by: SURGERY

## 2022-12-19 PROCEDURE — 99213 OFFICE O/P EST LOW 20 MIN: CPT | Performed by: SURGERY

## 2022-12-19 PROCEDURE — 3074F SYST BP LT 130 MM HG: CPT | Performed by: SURGERY

## 2022-12-19 PROCEDURE — G8417 CALC BMI ABV UP PARAM F/U: HCPCS | Performed by: SURGERY

## 2022-12-19 PROCEDURE — G8484 FLU IMMUNIZE NO ADMIN: HCPCS | Performed by: SURGERY

## 2022-12-22 DIAGNOSIS — Z85.3 PERSONAL HISTORY OF BREAST CANCER: Primary | ICD-10-CM

## 2023-03-06 DIAGNOSIS — I10 ESSENTIAL HYPERTENSION: ICD-10-CM

## 2023-03-06 DIAGNOSIS — M1A.9XX0 CHRONIC GOUT WITHOUT TOPHUS, UNSPECIFIED CAUSE, UNSPECIFIED SITE: ICD-10-CM

## 2023-03-06 RX ORDER — ALLOPURINOL 100 MG/1
TABLET ORAL
Qty: 180 TABLET | Refills: 0 | Status: SHIPPED | OUTPATIENT
Start: 2023-03-06

## 2023-03-15 NOTE — PROGRESS NOTES
Trachea: Trachea normal. No tracheal deviation. Cardiovascular:      Rate and Rhythm: Normal rate and regular rhythm. Pulses: Normal pulses. Heart sounds: Normal heart sounds. Pulmonary:      Effort: Pulmonary effort is normal. No respiratory distress. Breath sounds: Normal breath sounds. No wheezing or rales. Chest:      Chest wall: No tenderness. Abdominal:      General: Bowel sounds are normal. There is no distension. Palpations: Abdomen is soft. There is no mass. Tenderness: There is no abdominal tenderness. There is no guarding. Musculoskeletal:         General: No tenderness or deformity. Cervical back: Normal range of motion and neck supple. Comments: Range of motion within normal limits x4 extremities   Skin:     General: Skin is warm. Findings: No bruising, erythema or rash. Neurological:      Mental Status: She is alert and oriented to person, place, and time. Cranial Nerves: No cranial nerve deficit. Coordination: Coordination normal.   Psychiatric:         Behavior: Behavior normal.         Thought Content: Thought content normal.       Labs reviewed today:  Lab Results   Component Value Date    WBC 5.57 03/16/2023    HGB 14.8 03/16/2023    HCT 43.1 03/16/2023    MCV 89.6 03/16/2023     (L) 03/16/2023     Lab Results   Component Value Date    NEUTROABS 3.67 03/16/2023       ASSESSMENT/PLAN:      1. Infiltrating ductal carcinoma of the left breast, ER/CT positive, HER2/ramesh negative, grade 1, 4/26/2021. She is status post left lumpectomy with sentinel lymph node biopsy followed by adjuvant radiation therapy as of 9/1/2021. Current recommendation is for adjuvant endocrine therapy with letrozole 2.5 mg daily. Reports compliant with letrozole and has no new breast complaints.     Aimee Aguilera was seen in scheduled follow-up by Dr Nati Mccullough on 12/19/2022, I reviewed the progress note from that office visit which documented bilateral breast

## 2023-03-16 ENCOUNTER — OFFICE VISIT (OUTPATIENT)
Dept: HEMATOLOGY | Age: 78
End: 2023-03-16
Payer: MEDICARE

## 2023-03-16 ENCOUNTER — HOSPITAL ENCOUNTER (OUTPATIENT)
Dept: INFUSION THERAPY | Age: 78
Discharge: HOME | End: 2023-03-16
Payer: MEDICARE

## 2023-03-16 VITALS
OXYGEN SATURATION: 96 % | SYSTOLIC BLOOD PRESSURE: 136 MMHG | DIASTOLIC BLOOD PRESSURE: 92 MMHG | WEIGHT: 194 LBS | BODY MASS INDEX: 38.09 KG/M2 | HEIGHT: 60 IN | HEART RATE: 66 BPM

## 2023-03-16 DIAGNOSIS — Z79.811 ENCOUNTER FOR MONITORING AROMATASE INHIBITOR THERAPY: ICD-10-CM

## 2023-03-16 DIAGNOSIS — Z17.0 MALIGNANT NEOPLASM OF LOWER-OUTER QUADRANT OF LEFT BREAST OF FEMALE, ESTROGEN RECEPTOR POSITIVE (HCC): Primary | ICD-10-CM

## 2023-03-16 DIAGNOSIS — M85.89 OSTEOPENIA OF MULTIPLE SITES: ICD-10-CM

## 2023-03-16 DIAGNOSIS — I89.0 LYMPHEDEMA OF UPPER EXTREMITY: ICD-10-CM

## 2023-03-16 DIAGNOSIS — C50.512 MALIGNANT NEOPLASM OF LOWER-OUTER QUADRANT OF LEFT BREAST OF FEMALE, ESTROGEN RECEPTOR POSITIVE (HCC): ICD-10-CM

## 2023-03-16 DIAGNOSIS — E55.9 VITAMIN D DEFICIENCY: ICD-10-CM

## 2023-03-16 DIAGNOSIS — Z79.811 LONG TERM CURRENT USE OF AROMATASE INHIBITOR: ICD-10-CM

## 2023-03-16 DIAGNOSIS — Z17.0 MALIGNANT NEOPLASM OF LOWER-OUTER QUADRANT OF LEFT BREAST OF FEMALE, ESTROGEN RECEPTOR POSITIVE (HCC): ICD-10-CM

## 2023-03-16 DIAGNOSIS — C50.512 MALIGNANT NEOPLASM OF LOWER-OUTER QUADRANT OF LEFT BREAST OF FEMALE, ESTROGEN RECEPTOR POSITIVE (HCC): Primary | ICD-10-CM

## 2023-03-16 DIAGNOSIS — Z71.89 CARE PLAN DISCUSSED WITH PATIENT: ICD-10-CM

## 2023-03-16 DIAGNOSIS — Z78.0 POST-MENOPAUSE: ICD-10-CM

## 2023-03-16 DIAGNOSIS — C50.912 INFILTRATING DUCTAL CARCINOMA OF BREAST, LEFT (HCC): ICD-10-CM

## 2023-03-16 DIAGNOSIS — Z51.81 ENCOUNTER FOR MONITORING AROMATASE INHIBITOR THERAPY: ICD-10-CM

## 2023-03-16 LAB
25(OH)D3 SERPL-MCNC: 28.2 NG/ML
ALBUMIN SERPL-MCNC: 4.2 G/DL (ref 3.5–5.2)
ALP SERPL-CCNC: 87 U/L (ref 35–104)
ALT SERPL-CCNC: 35 U/L (ref 9–52)
ANION GAP SERPL CALCULATED.3IONS-SCNC: 5 MMOL/L (ref 7–19)
AST SERPL-CCNC: 39 U/L (ref 14–36)
BILIRUB SERPL-MCNC: 0.7 MG/DL (ref 0.2–1.3)
BUN SERPL-MCNC: 18 MG/DL (ref 7–17)
CALCIUM SERPL-MCNC: 10.3 MG/DL (ref 8.4–10.2)
CHLORIDE SERPL-SCNC: 100 MMOL/L (ref 98–111)
CO2 SERPL-SCNC: 35 MMOL/L (ref 22–29)
CREAT SERPL-MCNC: 0.7 MG/DL (ref 0.5–1)
ERYTHROCYTE [DISTWIDTH] IN BLOOD BY AUTOMATED COUNT: 12.4 % (ref 11.7–14.4)
GLOBULIN: 2.6 G/DL
GLUCOSE SERPL-MCNC: 104 MG/DL (ref 74–106)
HCT VFR BLD AUTO: 43.1 % (ref 34.1–44.9)
HGB BLD-MCNC: 14.8 G/DL (ref 11.2–15.7)
LYMPHOCYTES # BLD: 1.42 K/UL (ref 1.18–3.74)
LYMPHOCYTES NFR BLD: 25.5 % (ref 19.3–53.1)
MCH RBC QN AUTO: 30.8 PG (ref 25.6–32.2)
MCHC RBC AUTO-ENTMCNC: 34.3 G/DL (ref 32.3–35.5)
MCV RBC AUTO: 89.6 FL (ref 79.4–94.8)
MONOCYTES # BLD: 0.4 K/UL (ref 0.24–0.82)
MONOCYTES NFR BLD: 7.2 % (ref 4.7–12.5)
NEUTROPHILS # BLD: 3.67 K/UL (ref 1.56–6.13)
NEUTS SEG NFR BLD: 65.8 % (ref 34–71.1)
PLATELET # BLD AUTO: 170 K/UL (ref 182–369)
PMV BLD AUTO: 10.2 FL (ref 7.4–10.4)
POTASSIUM SERPL-SCNC: 4.3 MMOL/L (ref 3.5–5.1)
PROT SERPL-MCNC: 6.9 G/DL (ref 6.3–8.2)
RBC # BLD AUTO: 4.81 M/UL (ref 3.93–5.22)
SODIUM SERPL-SCNC: 140 MMOL/L (ref 137–145)
WBC # BLD AUTO: 5.57 K/UL (ref 3.98–10.04)

## 2023-03-16 PROCEDURE — 3074F SYST BP LT 130 MM HG: CPT | Performed by: NURSE PRACTITIONER

## 2023-03-16 PROCEDURE — 1036F TOBACCO NON-USER: CPT | Performed by: NURSE PRACTITIONER

## 2023-03-16 PROCEDURE — 36415 COLL VENOUS BLD VENIPUNCTURE: CPT

## 2023-03-16 PROCEDURE — 80053 COMPREHEN METABOLIC PANEL: CPT

## 2023-03-16 PROCEDURE — 1123F ACP DISCUSS/DSCN MKR DOCD: CPT | Performed by: NURSE PRACTITIONER

## 2023-03-16 PROCEDURE — G8484 FLU IMMUNIZE NO ADMIN: HCPCS | Performed by: NURSE PRACTITIONER

## 2023-03-16 PROCEDURE — 99214 OFFICE O/P EST MOD 30 MIN: CPT | Performed by: NURSE PRACTITIONER

## 2023-03-16 PROCEDURE — 85025 COMPLETE CBC W/AUTO DIFF WBC: CPT

## 2023-03-16 PROCEDURE — 99212 OFFICE O/P EST SF 10 MIN: CPT

## 2023-03-16 PROCEDURE — G8400 PT W/DXA NO RESULTS DOC: HCPCS | Performed by: NURSE PRACTITIONER

## 2023-03-16 PROCEDURE — G8417 CALC BMI ABV UP PARAM F/U: HCPCS | Performed by: NURSE PRACTITIONER

## 2023-03-16 PROCEDURE — 3078F DIAST BP <80 MM HG: CPT | Performed by: NURSE PRACTITIONER

## 2023-03-16 PROCEDURE — 1090F PRES/ABSN URINE INCON ASSESS: CPT | Performed by: NURSE PRACTITIONER

## 2023-03-16 PROCEDURE — G8427 DOCREV CUR MEDS BY ELIG CLIN: HCPCS | Performed by: NURSE PRACTITIONER

## 2023-03-17 ENCOUNTER — OFFICE VISIT (OUTPATIENT)
Dept: PRIMARY CARE CLINIC | Age: 78
End: 2023-03-17
Payer: MEDICARE

## 2023-03-17 VITALS
TEMPERATURE: 97.7 F | WEIGHT: 194.2 LBS | SYSTOLIC BLOOD PRESSURE: 138 MMHG | HEART RATE: 74 BPM | BODY MASS INDEX: 38.13 KG/M2 | DIASTOLIC BLOOD PRESSURE: 88 MMHG | HEIGHT: 60 IN | OXYGEN SATURATION: 96 %

## 2023-03-17 DIAGNOSIS — E66.01 SEVERE OBESITY (BMI 35.0-39.9) WITH COMORBIDITY (HCC): ICD-10-CM

## 2023-03-17 DIAGNOSIS — E78.2 MIXED HYPERLIPIDEMIA: ICD-10-CM

## 2023-03-17 DIAGNOSIS — M54.2 NECK PAIN: ICD-10-CM

## 2023-03-17 DIAGNOSIS — C50.512 MALIGNANT NEOPLASM OF LOWER-OUTER QUADRANT OF LEFT BREAST OF FEMALE, ESTROGEN RECEPTOR POSITIVE (HCC): ICD-10-CM

## 2023-03-17 DIAGNOSIS — I10 ESSENTIAL HYPERTENSION: Primary | ICD-10-CM

## 2023-03-17 DIAGNOSIS — Z17.0 MALIGNANT NEOPLASM OF LOWER-OUTER QUADRANT OF LEFT BREAST OF FEMALE, ESTROGEN RECEPTOR POSITIVE (HCC): ICD-10-CM

## 2023-03-17 DIAGNOSIS — I89.0 LYMPHEDEMA: ICD-10-CM

## 2023-03-17 PROCEDURE — G8484 FLU IMMUNIZE NO ADMIN: HCPCS | Performed by: NURSE PRACTITIONER

## 2023-03-17 PROCEDURE — G8417 CALC BMI ABV UP PARAM F/U: HCPCS | Performed by: NURSE PRACTITIONER

## 2023-03-17 PROCEDURE — 99214 OFFICE O/P EST MOD 30 MIN: CPT | Performed by: NURSE PRACTITIONER

## 2023-03-17 PROCEDURE — 3074F SYST BP LT 130 MM HG: CPT | Performed by: NURSE PRACTITIONER

## 2023-03-17 PROCEDURE — 3078F DIAST BP <80 MM HG: CPT | Performed by: NURSE PRACTITIONER

## 2023-03-17 PROCEDURE — 1090F PRES/ABSN URINE INCON ASSESS: CPT | Performed by: NURSE PRACTITIONER

## 2023-03-17 PROCEDURE — 1123F ACP DISCUSS/DSCN MKR DOCD: CPT | Performed by: NURSE PRACTITIONER

## 2023-03-17 PROCEDURE — G8427 DOCREV CUR MEDS BY ELIG CLIN: HCPCS | Performed by: NURSE PRACTITIONER

## 2023-03-17 PROCEDURE — G8400 PT W/DXA NO RESULTS DOC: HCPCS | Performed by: NURSE PRACTITIONER

## 2023-03-17 PROCEDURE — 1036F TOBACCO NON-USER: CPT | Performed by: NURSE PRACTITIONER

## 2023-03-17 SDOH — ECONOMIC STABILITY: INCOME INSECURITY: HOW HARD IS IT FOR YOU TO PAY FOR THE VERY BASICS LIKE FOOD, HOUSING, MEDICAL CARE, AND HEATING?: NOT HARD AT ALL

## 2023-03-17 SDOH — ECONOMIC STABILITY: HOUSING INSECURITY
IN THE LAST 12 MONTHS, WAS THERE A TIME WHEN YOU DID NOT HAVE A STEADY PLACE TO SLEEP OR SLEPT IN A SHELTER (INCLUDING NOW)?: NO

## 2023-03-17 SDOH — ECONOMIC STABILITY: FOOD INSECURITY: WITHIN THE PAST 12 MONTHS, THE FOOD YOU BOUGHT JUST DIDN'T LAST AND YOU DIDN'T HAVE MONEY TO GET MORE.: NEVER TRUE

## 2023-03-17 SDOH — ECONOMIC STABILITY: FOOD INSECURITY: WITHIN THE PAST 12 MONTHS, YOU WORRIED THAT YOUR FOOD WOULD RUN OUT BEFORE YOU GOT MONEY TO BUY MORE.: NEVER TRUE

## 2023-03-17 ASSESSMENT — PATIENT HEALTH QUESTIONNAIRE - PHQ9
SUM OF ALL RESPONSES TO PHQ QUESTIONS 1-9: 0
SUM OF ALL RESPONSES TO PHQ9 QUESTIONS 1 & 2: 0
2. FEELING DOWN, DEPRESSED OR HOPELESS: 0
1. LITTLE INTEREST OR PLEASURE IN DOING THINGS: 0
SUM OF ALL RESPONSES TO PHQ QUESTIONS 1-9: 0

## 2023-03-17 NOTE — PROGRESS NOTES
6/2/2021 [facility] GENERAL SURGERY       Social History     Tobacco Use    Smoking status: Never    Smokeless tobacco: Never   Substance Use Topics    Alcohol use: No        Current Outpatient Medications   Medication Sig Dispense Refill    metoprolol tartrate (LOPRESSOR) 25 MG tablet Take 1 tablet by mouth twice daily 180 tablet 0    allopurinol (ZYLOPRIM) 100 MG tablet Take 2 tablets by mouth once daily 180 tablet 0    lisinopril (PRINIVIL;ZESTRIL) 40 MG tablet Take 1 tablet by mouth once daily 90 tablet 1    lovastatin (MEVACOR) 40 MG tablet Take 1 tablet by mouth nightly 90 tablet 1    vitamin D 25 MCG (1000 UT) CAPS Take 25 mcg by mouth daily      Calcium Carbonate-Vit D-Min (CALCIUM 1200) 6705-1744 MG-UNIT CHEW Take 1 tablet by mouth daily       Compression Stockings MISC by Does not apply route Low to medium compression stocking just below knee (Patient taking differently: by Does not apply route Low to medium compression stocking just below knee wears occasionally) 1 each 0    multivitamin (THERAGRAN) per tablet Take 1 tablet by mouth daily. aspirin 81 MG EC tablet Take 81 mg by mouth daily. fish oil-omega-3 fatty acids 1000 MG capsule Take 1 g by mouth daily       letrozole (FEMARA) 2.5 MG tablet Take 1 tablet by mouth daily 90 tablet 5     No current facility-administered medications for this visit. No Known Allergies    Family History   Problem Relation Age of Onset    Heart Disease Mother     High Blood Pressure Mother     Heart Disease Father     High Blood Pressure Father     Ovarian Cancer Sister 79        Ovarian cancer    Cancer Maternal Aunt 61        Ovarian cancer    Cancer Sister 79        squamous cell skin cancer               Subjective:      Review of Systems   Constitutional:  Negative for activity change and fever. HENT:  Negative for congestion, ear pain and sore throat. Respiratory:  Negative for cough, chest tightness and shortness of breath.     Cardiovascular:

## 2023-03-20 ASSESSMENT — ENCOUNTER SYMPTOMS
EYES NEGATIVE: 1
WHEEZING: 0
SHORTNESS OF BREATH: 0
ABDOMINAL PAIN: 0
EYE DISCHARGE: 0
CONSTIPATION: 0
EYE PAIN: 0
DIARRHEA: 0
RESPIRATORY NEGATIVE: 1
BLOOD IN STOOL: 0
BACK PAIN: 0
NAUSEA: 0
SORE THROAT: 0
GASTROINTESTINAL NEGATIVE: 1
EYE REDNESS: 0
VOMITING: 0
COUGH: 0

## 2023-03-23 ASSESSMENT — ENCOUNTER SYMPTOMS
DIARRHEA: 0
VOMITING: 0
COLOR CHANGE: 0
NAUSEA: 0
SHORTNESS OF BREATH: 0
ABDOMINAL PAIN: 0
SORE THROAT: 0
CHEST TIGHTNESS: 0
COUGH: 0

## 2023-06-02 DIAGNOSIS — I10 ESSENTIAL HYPERTENSION: ICD-10-CM

## 2023-06-02 DIAGNOSIS — M1A.9XX0 CHRONIC GOUT WITHOUT TOPHUS, UNSPECIFIED CAUSE, UNSPECIFIED SITE: ICD-10-CM

## 2023-06-02 RX ORDER — ALLOPURINOL 100 MG/1
TABLET ORAL
Qty: 180 TABLET | Refills: 0 | Status: SHIPPED | OUTPATIENT
Start: 2023-06-02

## 2023-06-20 ENCOUNTER — HOSPITAL ENCOUNTER (OUTPATIENT)
Dept: WOMENS IMAGING | Age: 78
Discharge: HOME | End: 2023-06-20
Payer: MEDICARE

## 2023-06-20 DIAGNOSIS — Z85.3 PERSONAL HISTORY OF BREAST CANCER: ICD-10-CM

## 2023-06-20 PROCEDURE — 77066 DX MAMMO INCL CAD BI: CPT | Performed by: RADIOLOGY

## 2023-06-20 PROCEDURE — G0279 TOMOSYNTHESIS, MAMMO: HCPCS

## 2023-06-26 ENCOUNTER — OFFICE VISIT (OUTPATIENT)
Dept: SURGERY | Age: 78
End: 2023-06-26
Payer: MEDICARE

## 2023-06-26 VITALS — SYSTOLIC BLOOD PRESSURE: 132 MMHG | HEART RATE: 80 BPM | DIASTOLIC BLOOD PRESSURE: 84 MMHG

## 2023-06-26 DIAGNOSIS — Z17.0 MALIGNANT NEOPLASM OF LOWER-OUTER QUADRANT OF LEFT BREAST OF FEMALE, ESTROGEN RECEPTOR POSITIVE (HCC): Primary | Chronic | ICD-10-CM

## 2023-06-26 DIAGNOSIS — C50.512 MALIGNANT NEOPLASM OF LOWER-OUTER QUADRANT OF LEFT BREAST OF FEMALE, ESTROGEN RECEPTOR POSITIVE (HCC): Primary | Chronic | ICD-10-CM

## 2023-06-26 DIAGNOSIS — Z98.890 STATUS POST LEFT BREAST LUMPECTOMY: ICD-10-CM

## 2023-06-26 PROCEDURE — 1090F PRES/ABSN URINE INCON ASSESS: CPT | Performed by: SURGERY

## 2023-06-26 PROCEDURE — G8417 CALC BMI ABV UP PARAM F/U: HCPCS | Performed by: SURGERY

## 2023-06-26 PROCEDURE — 3078F DIAST BP <80 MM HG: CPT | Performed by: SURGERY

## 2023-06-26 PROCEDURE — G8427 DOCREV CUR MEDS BY ELIG CLIN: HCPCS | Performed by: SURGERY

## 2023-06-26 PROCEDURE — 1123F ACP DISCUSS/DSCN MKR DOCD: CPT | Performed by: SURGERY

## 2023-06-26 PROCEDURE — 99213 OFFICE O/P EST LOW 20 MIN: CPT | Performed by: SURGERY

## 2023-06-26 PROCEDURE — 3074F SYST BP LT 130 MM HG: CPT | Performed by: SURGERY

## 2023-06-26 PROCEDURE — G8400 PT W/DXA NO RESULTS DOC: HCPCS | Performed by: SURGERY

## 2023-06-26 PROCEDURE — 1036F TOBACCO NON-USER: CPT | Performed by: SURGERY

## 2023-07-22 DIAGNOSIS — C50.912 INFILTRATING DUCTAL CARCINOMA OF BREAST, LEFT (HCC): ICD-10-CM

## 2023-07-24 RX ORDER — LETROZOLE 2.5 MG/1
TABLET, FILM COATED ORAL
Qty: 90 TABLET | Refills: 3 | Status: SHIPPED | OUTPATIENT
Start: 2023-07-24

## 2023-08-07 DIAGNOSIS — E78.2 MIXED HYPERLIPIDEMIA: ICD-10-CM

## 2023-08-07 DIAGNOSIS — I10 ESSENTIAL HYPERTENSION: ICD-10-CM

## 2023-08-07 RX ORDER — LOVASTATIN 40 MG/1
40 TABLET ORAL NIGHTLY
Qty: 90 TABLET | Refills: 0 | Status: SHIPPED | OUTPATIENT
Start: 2023-08-07

## 2023-08-07 RX ORDER — LISINOPRIL 40 MG/1
TABLET ORAL
Qty: 90 TABLET | Refills: 0 | Status: SHIPPED | OUTPATIENT
Start: 2023-08-07

## 2023-09-05 DIAGNOSIS — I10 ESSENTIAL HYPERTENSION: ICD-10-CM

## 2023-09-05 DIAGNOSIS — M1A.9XX0 CHRONIC GOUT WITHOUT TOPHUS, UNSPECIFIED CAUSE, UNSPECIFIED SITE: ICD-10-CM

## 2023-09-06 RX ORDER — ALLOPURINOL 100 MG/1
TABLET ORAL
Qty: 180 TABLET | Refills: 0 | Status: SHIPPED | OUTPATIENT
Start: 2023-09-06

## 2023-09-07 DIAGNOSIS — E78.2 MIXED HYPERLIPIDEMIA: ICD-10-CM

## 2023-09-07 DIAGNOSIS — I10 ESSENTIAL HYPERTENSION: ICD-10-CM

## 2023-09-07 LAB
ALBUMIN SERPL-MCNC: 4.4 G/DL (ref 3.5–5.2)
ALP SERPL-CCNC: 94 U/L (ref 35–104)
ALT SERPL-CCNC: 25 U/L (ref 5–33)
ANION GAP SERPL CALCULATED.3IONS-SCNC: 9 MMOL/L (ref 7–19)
AST SERPL-CCNC: 26 U/L (ref 5–32)
BASOPHILS # BLD: 0 K/UL (ref 0–0.2)
BASOPHILS NFR BLD: 0.2 % (ref 0–1)
BILIRUB SERPL-MCNC: 0.5 MG/DL (ref 0.2–1.2)
BUN SERPL-MCNC: 18 MG/DL (ref 8–23)
CALCIUM SERPL-MCNC: 9.6 MG/DL (ref 8.8–10.2)
CHLORIDE SERPL-SCNC: 102 MMOL/L (ref 98–111)
CHOLEST SERPL-MCNC: 193 MG/DL (ref 160–199)
CO2 SERPL-SCNC: 29 MMOL/L (ref 22–29)
CREAT SERPL-MCNC: 0.7 MG/DL (ref 0.5–0.9)
EOSINOPHIL # BLD: 0.1 K/UL (ref 0–0.6)
EOSINOPHIL NFR BLD: 0.8 % (ref 0–5)
ERYTHROCYTE [DISTWIDTH] IN BLOOD BY AUTOMATED COUNT: 12.7 % (ref 11.5–14.5)
GLUCOSE SERPL-MCNC: 90 MG/DL (ref 74–109)
HCT VFR BLD AUTO: 42 % (ref 37–47)
HDLC SERPL-MCNC: 53 MG/DL (ref 65–121)
HGB BLD-MCNC: 14.4 G/DL (ref 12–16)
IMM GRANULOCYTES # BLD: 0 K/UL
LDLC SERPL CALC-MCNC: 101 MG/DL
LYMPHOCYTES # BLD: 1.8 K/UL (ref 1.1–4.5)
LYMPHOCYTES NFR BLD: 28.5 % (ref 20–40)
MCH RBC QN AUTO: 31 PG (ref 27–31)
MCHC RBC AUTO-ENTMCNC: 34.3 G/DL (ref 33–37)
MCV RBC AUTO: 90.5 FL (ref 81–99)
MONOCYTES # BLD: 0.4 K/UL (ref 0–0.9)
MONOCYTES NFR BLD: 7 % (ref 0–10)
NEUTROPHILS # BLD: 3.9 K/UL (ref 1.5–7.5)
NEUTS SEG NFR BLD: 63 % (ref 50–65)
PLATELET # BLD AUTO: 154 K/UL (ref 130–400)
PMV BLD AUTO: 10.6 FL (ref 9.4–12.3)
POTASSIUM SERPL-SCNC: 4.7 MMOL/L (ref 3.5–5)
PROT SERPL-MCNC: 7.2 G/DL (ref 6.6–8.7)
RBC # BLD AUTO: 4.64 M/UL (ref 4.2–5.4)
SODIUM SERPL-SCNC: 140 MMOL/L (ref 136–145)
TRIGL SERPL-MCNC: 197 MG/DL (ref 0–149)
WBC # BLD AUTO: 6.1 K/UL (ref 4.8–10.8)

## 2023-09-12 ENCOUNTER — TELEPHONE (OUTPATIENT)
Dept: HEMATOLOGY | Age: 78
End: 2023-09-12

## 2023-09-12 NOTE — TELEPHONE ENCOUNTER
Called patient and reminded them of their upcoming appt. on 09/14/23, Patient voiced their understanding.

## 2023-09-13 NOTE — PROGRESS NOTES
Progress Note      Pt Name: Jemal Mas  YOB: 1960  MRN: [id number]    Date of evaluation: 03/16/2023  History Obtained From:  patient, electronic medical record    CHIEF COMPLAINT:    Chief Complaint   Patient presents with    Follow-up     Malignant neoplasm of lower-outer quadrant of left breast of female, estrogen receptor positive (     HISTORY OF PRESENT ILLNESS:    Jemal Mas is a 69 y.o.  female who is currently being followed for infiltrating ductal carcinoma of the left breast, ER/WV positive, HER2/ramesh negative, grade 1, 4/26/2021. She is status post left lumpectomy with sentinel lymph node biopsy followed by adjuvant radiation therapy completed 9/1/2021. She has had no radiographic imaging to suggest recurrent disease. Current recommendation is for adjuvant endocrine therapy with letrozole 2.5 mg daily, for anticipated 5 years, July 2026. Manas Cuenca returns today in scheduled follow-up for evaluation, side effect monitoring and further treatment recommendations. She reported being compliant with letrozole and has no new breast complaints. Today's clinic visit to include physical assessment, review of systems, any lab or radiographic findings that were available and plan of care are documented below. ONCOLOGIC HISTORY:   Diagnosis  Infiltrating ductal carcinoma, left breast April 2021  Favor grade 1  ER 70%, WV 77%, Her-2 ramesh-negative, Ki67 8%-low  MammaPrint Luminal type A, low risk  Oncotype DX recurrence score: 6     Treatment Summary  05/18/2021 Tamoxifen 20 mg daily  06/04/2021 Left breast lumpectomy with negative SLNB, 0/8 positive LN   07/02/2021-Switched to Letrozole 2.5mg daily  8/11/2021-9/1/2021-Completed XRT 4256 cGy in 16 fractions to Infiltrating ductal carcinoma to left breast  11/30/2021 Vitamin D/Calcium daily     Cancer History  Renae Lal was first seen by Dr David Mack on 7/3/2021.   She was referred by Dr. Josh Love for history of

## 2023-09-14 ENCOUNTER — OFFICE VISIT (OUTPATIENT)
Dept: HEMATOLOGY | Age: 78
End: 2023-09-14
Payer: MEDICARE

## 2023-09-14 ENCOUNTER — HOSPITAL ENCOUNTER (OUTPATIENT)
Dept: INFUSION THERAPY | Age: 78
Discharge: HOME | End: 2023-09-14
Payer: MEDICARE

## 2023-09-14 VITALS
DIASTOLIC BLOOD PRESSURE: 64 MMHG | WEIGHT: 194.8 LBS | SYSTOLIC BLOOD PRESSURE: 128 MMHG | HEART RATE: 68 BPM | OXYGEN SATURATION: 95 % | BODY MASS INDEX: 38.24 KG/M2 | HEIGHT: 60 IN

## 2023-09-14 DIAGNOSIS — C50.912 INFILTRATING DUCTAL CARCINOMA OF BREAST, LEFT (HCC): ICD-10-CM

## 2023-09-14 DIAGNOSIS — E55.9 VITAMIN D DEFICIENCY: ICD-10-CM

## 2023-09-14 DIAGNOSIS — I89.0 LYMPHEDEMA OF UPPER EXTREMITY: ICD-10-CM

## 2023-09-14 DIAGNOSIS — Z79.811 LONG TERM CURRENT USE OF AROMATASE INHIBITOR: ICD-10-CM

## 2023-09-14 DIAGNOSIS — C50.912 INFILTRATING DUCTAL CARCINOMA OF BREAST, LEFT (HCC): Primary | ICD-10-CM

## 2023-09-14 DIAGNOSIS — Z00.00 HEALTHCARE MAINTENANCE: ICD-10-CM

## 2023-09-14 DIAGNOSIS — Z51.81 ENCOUNTER FOR MONITORING AROMATASE INHIBITOR THERAPY: ICD-10-CM

## 2023-09-14 DIAGNOSIS — Z79.811 ENCOUNTER FOR MONITORING AROMATASE INHIBITOR THERAPY: ICD-10-CM

## 2023-09-14 LAB
BASOPHILS # BLD: 0.02 K/UL (ref 0.01–0.08)
BASOPHILS NFR BLD: 0.3 % (ref 0.1–1.2)
EOSINOPHIL # BLD: 0.07 K/UL (ref 0.04–0.54)
EOSINOPHIL NFR BLD: 1.1 % (ref 0.7–7)
ERYTHROCYTE [DISTWIDTH] IN BLOOD BY AUTOMATED COUNT: 12.9 % (ref 11.7–14.4)
HCT VFR BLD AUTO: 40.2 % (ref 34.1–44.9)
HGB BLD-MCNC: 14.3 G/DL (ref 11.2–15.7)
LYMPHOCYTES # BLD: 1.5 K/UL (ref 1.18–3.74)
LYMPHOCYTES NFR BLD: 24.5 % (ref 19.3–53.1)
MCH RBC QN AUTO: 30.8 PG (ref 25.6–32.2)
MCHC RBC AUTO-ENTMCNC: 35.6 G/DL (ref 32.3–35.5)
MCV RBC AUTO: 86.5 FL (ref 79.4–94.8)
MONOCYTES # BLD: 0.47 K/UL (ref 0.24–0.82)
MONOCYTES NFR BLD: 7.7 % (ref 4.7–12.5)
NEUTROPHILS # BLD: 4.01 K/UL (ref 1.56–6.13)
NEUTS SEG NFR BLD: 65.7 % (ref 34–71.1)
PLATELET # BLD AUTO: 159 K/UL (ref 182–369)
PMV BLD AUTO: 10.2 FL (ref 7.4–10.4)
RBC # BLD AUTO: 4.65 M/UL (ref 3.93–5.22)
WBC # BLD AUTO: 6.11 K/UL (ref 3.98–10.04)

## 2023-09-14 PROCEDURE — 3074F SYST BP LT 130 MM HG: CPT | Performed by: NURSE PRACTITIONER

## 2023-09-14 PROCEDURE — 1090F PRES/ABSN URINE INCON ASSESS: CPT | Performed by: NURSE PRACTITIONER

## 2023-09-14 PROCEDURE — G8417 CALC BMI ABV UP PARAM F/U: HCPCS | Performed by: NURSE PRACTITIONER

## 2023-09-14 PROCEDURE — G8427 DOCREV CUR MEDS BY ELIG CLIN: HCPCS | Performed by: NURSE PRACTITIONER

## 2023-09-14 PROCEDURE — 85025 COMPLETE CBC W/AUTO DIFF WBC: CPT

## 2023-09-14 PROCEDURE — 99212 OFFICE O/P EST SF 10 MIN: CPT

## 2023-09-14 PROCEDURE — 36415 COLL VENOUS BLD VENIPUNCTURE: CPT

## 2023-09-14 PROCEDURE — 99214 OFFICE O/P EST MOD 30 MIN: CPT | Performed by: NURSE PRACTITIONER

## 2023-09-14 PROCEDURE — G8400 PT W/DXA NO RESULTS DOC: HCPCS | Performed by: NURSE PRACTITIONER

## 2023-09-14 PROCEDURE — 1123F ACP DISCUSS/DSCN MKR DOCD: CPT | Performed by: NURSE PRACTITIONER

## 2023-09-14 PROCEDURE — 3078F DIAST BP <80 MM HG: CPT | Performed by: NURSE PRACTITIONER

## 2023-09-14 PROCEDURE — 1036F TOBACCO NON-USER: CPT | Performed by: NURSE PRACTITIONER

## 2023-09-17 SDOH — HEALTH STABILITY: PHYSICAL HEALTH: ON AVERAGE, HOW MANY MINUTES DO YOU ENGAGE IN EXERCISE AT THIS LEVEL?: 40 MIN

## 2023-09-17 SDOH — HEALTH STABILITY: PHYSICAL HEALTH: ON AVERAGE, HOW MANY DAYS PER WEEK DO YOU ENGAGE IN MODERATE TO STRENUOUS EXERCISE (LIKE A BRISK WALK)?: 6 DAYS

## 2023-09-17 ASSESSMENT — LIFESTYLE VARIABLES
HOW MANY STANDARD DRINKS CONTAINING ALCOHOL DO YOU HAVE ON A TYPICAL DAY: 0
HOW OFTEN DO YOU HAVE A DRINK CONTAINING ALCOHOL: NEVER
HOW MANY STANDARD DRINKS CONTAINING ALCOHOL DO YOU HAVE ON A TYPICAL DAY: PATIENT DOES NOT DRINK
HOW OFTEN DO YOU HAVE A DRINK CONTAINING ALCOHOL: 1
HOW OFTEN DO YOU HAVE SIX OR MORE DRINKS ON ONE OCCASION: 1

## 2023-09-17 ASSESSMENT — PATIENT HEALTH QUESTIONNAIRE - PHQ9
SUM OF ALL RESPONSES TO PHQ QUESTIONS 1-9: 0
1. LITTLE INTEREST OR PLEASURE IN DOING THINGS: 0
SUM OF ALL RESPONSES TO PHQ9 QUESTIONS 1 & 2: 0
2. FEELING DOWN, DEPRESSED OR HOPELESS: 0

## 2023-09-18 ENCOUNTER — OFFICE VISIT (OUTPATIENT)
Dept: PRIMARY CARE CLINIC | Age: 78
End: 2023-09-18

## 2023-09-18 VITALS
HEART RATE: 74 BPM | HEIGHT: 60 IN | OXYGEN SATURATION: 97 % | DIASTOLIC BLOOD PRESSURE: 80 MMHG | TEMPERATURE: 97.6 F | WEIGHT: 194.4 LBS | SYSTOLIC BLOOD PRESSURE: 130 MMHG | BODY MASS INDEX: 38.17 KG/M2

## 2023-09-18 DIAGNOSIS — Z23 NEED FOR INFLUENZA VACCINATION: ICD-10-CM

## 2023-09-18 DIAGNOSIS — Z00.00 MEDICARE ANNUAL WELLNESS VISIT, SUBSEQUENT: Primary | ICD-10-CM

## 2023-09-18 DIAGNOSIS — I10 ESSENTIAL HYPERTENSION: ICD-10-CM

## 2023-09-18 DIAGNOSIS — E78.2 MIXED HYPERLIPIDEMIA: Chronic | ICD-10-CM

## 2023-09-18 PROBLEM — M10.279 ACUTE DRUG-INDUCED GOUT OF FOOT: Status: RESOLVED | Noted: 2018-04-23 | Resolved: 2023-09-18

## 2023-09-18 RX ORDER — LOVASTATIN 40 MG/1
40 TABLET ORAL NIGHTLY
Qty: 90 TABLET | Refills: 1 | Status: SHIPPED | OUTPATIENT
Start: 2023-09-18

## 2023-09-18 RX ORDER — LISINOPRIL 40 MG/1
40 TABLET ORAL DAILY
Qty: 90 TABLET | Refills: 1 | Status: SHIPPED | OUTPATIENT
Start: 2023-09-18

## 2023-09-18 NOTE — PATIENT INSTRUCTIONS
Advance Directives: Care Instructions  Overview  An advance directive is a legal way to state your wishes at the end of your life. It tells your family and your doctor what to do if you can't say what you want. There are two main types of advance directives. You can change them any time your wishes change. Living will. This form tells your family and your doctor your wishes about life support and other treatment. The form is also called a declaration. Medical power of . This form lets you name a person to make treatment decisions for you when you can't speak for yourself. This person is called a health care agent (health care proxy, health care surrogate). The form is also called a durable power of  for health care. If you do not have an advance directive, decisions about your medical care may be made by a family member, or by a doctor or a  who doesn't know you. It may help to think of an advance directive as a gift to the people who care for you. If you have one, they won't have to make tough decisions by themselves. For more information, including forms for your state, see the [address] [place] [address] website (www.caringinfo.org/planning/advance-directives/). Follow-up care is a key part of your treatment and safety. Be sure to make and go to all appointments, and call your doctor if you are having problems. It's also a good idea to know your test results and keep a list of the medicines you take. What should you include in an advance directive? Many states have a unique advance directive form. (It may ask you to address specific issues.) Or you might use a universal form that's approved by many states. If your form doesn't tell you what to address, it may be hard to know what to include in your advance directive. Use the questions below to help you get started. Who do you want to make decisions about your medical care if you are not able to?   What life-support measures do you want if you

## 2023-09-20 ASSESSMENT — ENCOUNTER SYMPTOMS
VOMITING: 0
ABDOMINAL PAIN: 0
RESPIRATORY NEGATIVE: 1
NAUSEA: 0
GASTROINTESTINAL NEGATIVE: 1
COUGH: 0
BLOOD IN STOOL: 0
EYE DISCHARGE: 0
BACK PAIN: 0
EYE REDNESS: 0
SHORTNESS OF BREATH: 0
DIARRHEA: 0
CONSTIPATION: 0
EYE PAIN: 0
WHEEZING: 0
EYES NEGATIVE: 1
SORE THROAT: 0

## 2023-12-04 DIAGNOSIS — M1A.9XX0 CHRONIC GOUT WITHOUT TOPHUS, UNSPECIFIED CAUSE, UNSPECIFIED SITE: ICD-10-CM

## 2023-12-04 DIAGNOSIS — I10 ESSENTIAL HYPERTENSION: ICD-10-CM

## 2023-12-04 RX ORDER — ALLOPURINOL 100 MG/1
TABLET ORAL
Qty: 180 TABLET | Refills: 0 | Status: SHIPPED | OUTPATIENT
Start: 2023-12-04

## 2023-12-26 NOTE — PROGRESS NOTES
carcinoma is located 0.4 cm from the nearest medial surgical   excision margin.   4.  Focal low-grade ductal carcinoma in situ is identified in conjunction   with the invasive lesion.   5.  In situ carcinoma is located greater than 1.0 cm from all evaluated   margins.   6.  Prior biopsy site identified.   7.  Sections of skin, negative for evidence of malignancy.     C.  Breast, excision of additional left breast medial margin:   1.  Focal atypical ductal hyperplasia, margins negative.   2.  Negative for evidence of residual in situ or invasive carcinoma.     D.  Breast, excision of additional left breast deep margin: Benign   adipose tissue and skeletal muscle.     E.  Lymph nodes, left sentinel lymph node biopsies: 8 lymph nodes,   negative for evidence of malignancy.     AJCC STAGE: pT1c, pN0, pMx     She has completed radiation therapy     Mammogram-6/20/2023  Breast parenchyma is scattered fibroglandular densities.  Posttreatment changes are evident throughout the left breast centrally  including a mass with surgical artifacts far posteriorly, and  associated trabecular thickening. There is also a surgical excisional  biopsy scar in the right breast centered around 12:00 where there is a  mammographically benign oil cyst. There is no suspicious mammographic  finding seen in either breast. The mammograms were evaluated using  computer aided detection.  IMPRESSION:  Impression:  Bilateral Breasts, BI-RADS 2, benign. Recommendation is annual  screening mammography.  Overall assessment BI-RADS 2, benign.  Signed by Dr Debbie Herrera    PHYSICAL EXAM:  The  wounds look good with no evidence of infection, fluid accumulation, or skin necrosis.  She does have mild radiation changes in the left breast which have improved.  She has no dominant masses, no skin or nipple changes, and no axillary adenopathy.  There is no evidence of new or recurrent neoplasm    IMPRESSION:    Doing well s/p left partial mastectomy with

## 2024-02-14 ENCOUNTER — OFFICE VISIT (OUTPATIENT)
Dept: SURGERY | Age: 79
End: 2024-02-14

## 2024-02-14 VITALS
WEIGHT: 184.4 LBS | HEIGHT: 60 IN | HEART RATE: 82 BPM | OXYGEN SATURATION: 97 % | TEMPERATURE: 97.8 F | BODY MASS INDEX: 36.2 KG/M2

## 2024-02-14 DIAGNOSIS — Z17.0 MALIGNANT NEOPLASM OF LOWER-OUTER QUADRANT OF LEFT BREAST OF FEMALE, ESTROGEN RECEPTOR POSITIVE (HCC): Chronic | ICD-10-CM

## 2024-02-14 DIAGNOSIS — C50.512 MALIGNANT NEOPLASM OF LOWER-OUTER QUADRANT OF LEFT BREAST OF FEMALE, ESTROGEN RECEPTOR POSITIVE (HCC): Chronic | ICD-10-CM

## 2024-02-14 DIAGNOSIS — Z98.890 STATUS POST LEFT BREAST LUMPECTOMY: Primary | ICD-10-CM

## 2024-02-27 DIAGNOSIS — Z85.3 PERSONAL HISTORY OF BREAST CANCER: Primary | ICD-10-CM

## 2024-03-04 DIAGNOSIS — I10 ESSENTIAL HYPERTENSION: ICD-10-CM

## 2024-03-04 DIAGNOSIS — M1A.9XX0 CHRONIC GOUT WITHOUT TOPHUS, UNSPECIFIED CAUSE, UNSPECIFIED SITE: ICD-10-CM

## 2024-03-04 RX ORDER — ALLOPURINOL 100 MG/1
TABLET ORAL
Qty: 180 TABLET | Refills: 0 | Status: SHIPPED | OUTPATIENT
Start: 2024-03-04

## 2024-03-18 ENCOUNTER — OFFICE VISIT (OUTPATIENT)
Dept: PRIMARY CARE CLINIC | Age: 79
End: 2024-03-18
Payer: MEDICARE

## 2024-03-18 VITALS
HEIGHT: 60 IN | OXYGEN SATURATION: 97 % | HEART RATE: 74 BPM | WEIGHT: 197 LBS | DIASTOLIC BLOOD PRESSURE: 84 MMHG | TEMPERATURE: 97 F | SYSTOLIC BLOOD PRESSURE: 136 MMHG | BODY MASS INDEX: 38.68 KG/M2

## 2024-03-18 DIAGNOSIS — M1A.9XX0 CHRONIC GOUT WITHOUT TOPHUS, UNSPECIFIED CAUSE, UNSPECIFIED SITE: ICD-10-CM

## 2024-03-18 DIAGNOSIS — E66.01 SEVERE OBESITY (BMI 35.0-39.9) WITH COMORBIDITY (HCC): ICD-10-CM

## 2024-03-18 DIAGNOSIS — I10 ESSENTIAL HYPERTENSION: Primary | ICD-10-CM

## 2024-03-18 DIAGNOSIS — E78.2 MIXED HYPERLIPIDEMIA: ICD-10-CM

## 2024-03-18 PROCEDURE — 3075F SYST BP GE 130 - 139MM HG: CPT | Performed by: NURSE PRACTITIONER

## 2024-03-18 PROCEDURE — 1123F ACP DISCUSS/DSCN MKR DOCD: CPT | Performed by: NURSE PRACTITIONER

## 2024-03-18 PROCEDURE — G8400 PT W/DXA NO RESULTS DOC: HCPCS | Performed by: NURSE PRACTITIONER

## 2024-03-18 PROCEDURE — 1090F PRES/ABSN URINE INCON ASSESS: CPT | Performed by: NURSE PRACTITIONER

## 2024-03-18 PROCEDURE — G8484 FLU IMMUNIZE NO ADMIN: HCPCS | Performed by: NURSE PRACTITIONER

## 2024-03-18 PROCEDURE — 1036F TOBACCO NON-USER: CPT | Performed by: NURSE PRACTITIONER

## 2024-03-18 PROCEDURE — 99214 OFFICE O/P EST MOD 30 MIN: CPT | Performed by: NURSE PRACTITIONER

## 2024-03-18 PROCEDURE — G8427 DOCREV CUR MEDS BY ELIG CLIN: HCPCS | Performed by: NURSE PRACTITIONER

## 2024-03-18 PROCEDURE — 3079F DIAST BP 80-89 MM HG: CPT | Performed by: NURSE PRACTITIONER

## 2024-03-18 PROCEDURE — G8417 CALC BMI ABV UP PARAM F/U: HCPCS | Performed by: NURSE PRACTITIONER

## 2024-03-18 RX ORDER — LISINOPRIL 40 MG/1
40 TABLET ORAL DAILY
Qty: 90 TABLET | Refills: 1 | Status: SHIPPED | OUTPATIENT
Start: 2024-03-18

## 2024-03-18 RX ORDER — LOVASTATIN 40 MG/1
40 TABLET ORAL NIGHTLY
Qty: 90 TABLET | Refills: 1 | Status: SHIPPED | OUTPATIENT
Start: 2024-03-18

## 2024-03-18 RX ORDER — ALLOPURINOL 100 MG/1
200 TABLET ORAL DAILY
Qty: 180 TABLET | Refills: 0 | Status: SHIPPED | OUTPATIENT
Start: 2024-03-18

## 2024-03-18 SDOH — ECONOMIC STABILITY: FOOD INSECURITY: WITHIN THE PAST 12 MONTHS, THE FOOD YOU BOUGHT JUST DIDN'T LAST AND YOU DIDN'T HAVE MONEY TO GET MORE.: NEVER TRUE

## 2024-03-18 SDOH — ECONOMIC STABILITY: FOOD INSECURITY: WITHIN THE PAST 12 MONTHS, YOU WORRIED THAT YOUR FOOD WOULD RUN OUT BEFORE YOU GOT MONEY TO BUY MORE.: NEVER TRUE

## 2024-03-18 SDOH — ECONOMIC STABILITY: INCOME INSECURITY: HOW HARD IS IT FOR YOU TO PAY FOR THE VERY BASICS LIKE FOOD, HOUSING, MEDICAL CARE, AND HEATING?: NOT HARD AT ALL

## 2024-03-18 ASSESSMENT — ENCOUNTER SYMPTOMS
DIARRHEA: 0
NAUSEA: 0
COLOR CHANGE: 0
COUGH: 0
CHEST TIGHTNESS: 0
SORE THROAT: 0
ABDOMINAL PAIN: 0
SHORTNESS OF BREATH: 0
VOMITING: 0

## 2024-03-18 ASSESSMENT — PATIENT HEALTH QUESTIONNAIRE - PHQ9
1. LITTLE INTEREST OR PLEASURE IN DOING THINGS: NOT AT ALL
SUM OF ALL RESPONSES TO PHQ QUESTIONS 1-9: 0
2. FEELING DOWN, DEPRESSED OR HOPELESS: NOT AT ALL
SUM OF ALL RESPONSES TO PHQ9 QUESTIONS 1 & 2: 0

## 2024-03-18 NOTE — PROGRESS NOTES
[address] [place] [address] Drive   [address] [facility], [address]     Phone:  [phone]  Fax:  [phone]      Fior Ying is a 78 y.o. female who presents today for her medical conditions/complaints as noted below.  Fior Ying is c/o of 6 Month Follow-Up, Hypertension, and Hyperlipidemia      Chief Complaint   Patient presents with    6 Month Follow-Up    Hypertension    Hyperlipidemia       HPI:     HPI      Patient presents for 6 month follow up for hypertension, hyperlipidemia, and obesity. Has not had any trouble with gout. Denies any new issues or concerns. Blood pressure has been well controlled. Has not been walking as she usually does, but does plan to start again.     Past Medical History:   Diagnosis Date    Breast cancer (HCC) 04/2021     Left IDC    Cancer (HCC)     breast    Gout     Hyperlipidemia     Hypertension     Osteoarthritis     Squamous cell carcinoma of face     Vertigo         Past Surgical History:   Procedure Laterality Date    ANUS SURGERY      polyp removed    BREAST BIOPSY      Right-benign    BREAST LUMPECTOMY Left 06/04/2021    LEFT LUMPECTOMY AND SNB, PREOP US AND INTRAOP US GUIDED NEEDLE LOC, BIOSORB, FLAPS, MARGIN PRBE, BILATERAL PEC BLOCK performed by Floyd Lobato MD at [facility] OR    BREAST SURGERY Right 06/04/2021    EXCISION RIGHT BREAST LESION WITH NEEDLE LOCALIZATION performed by Floyd Lobato MD at [facility] OR    COLONOSCOPY      HYSTERECTOMY (CERVIX STATUS UNKNOWN)      HYSTERECTOMY (CERVIX STATUS UNKNOWN)  2013    KNEE SURGERY      bilateral replacement    RECTAL SURGERY  08/24/2012    Rigid proctoscopy with transanal excision of rectal polyp    SKIN CANCER EXCISION  04/2022    TUBAL LIGATION  1984    US BREAST BIOPSY W LOC DEVICE 1ST LESION LEFT Left 04/26/2021    US BREAST NEEDLE BIOPSY LEFT 4/26/2021 [facility] GENERAL SURGERY    US BREAST BIOPSY W LOC DEVICE 1ST LESION RIGHT Right 06/02/2021    US BREAST NEEDLE BIOPSY RIGHT 6/2/2021 [facility] GENERAL SURGERY       Social

## 2024-04-08 ENCOUNTER — TELEPHONE (OUTPATIENT)
Dept: HEMATOLOGY | Age: 79
End: 2024-04-08

## 2024-04-08 NOTE — TELEPHONE ENCOUNTER
Called pt to confirm appointment for 4/10/24 at 10:00 AM. Pt , Shamir, answered phone and said he would pass the message to pt, but he did not confirm the appointment

## 2024-04-10 ENCOUNTER — HOSPITAL ENCOUNTER (OUTPATIENT)
Dept: INFUSION THERAPY | Age: 79
Discharge: HOME | End: 2024-04-10
Payer: MEDICARE

## 2024-04-10 ENCOUNTER — OFFICE VISIT (OUTPATIENT)
Dept: HEMATOLOGY | Age: 79
End: 2024-04-10
Payer: MEDICARE

## 2024-04-10 VITALS
HEIGHT: 60 IN | TEMPERATURE: 98.7 F | WEIGHT: 197.6 LBS | DIASTOLIC BLOOD PRESSURE: 93 MMHG | BODY MASS INDEX: 38.79 KG/M2 | OXYGEN SATURATION: 96 % | SYSTOLIC BLOOD PRESSURE: 148 MMHG | HEART RATE: 65 BPM

## 2024-04-10 DIAGNOSIS — M85.89 OSTEOPENIA OF MULTIPLE SITES: ICD-10-CM

## 2024-04-10 DIAGNOSIS — E55.9 VITAMIN D DEFICIENCY: ICD-10-CM

## 2024-04-10 DIAGNOSIS — C50.912 INFILTRATING DUCTAL CARCINOMA OF BREAST, LEFT (HCC): ICD-10-CM

## 2024-04-10 DIAGNOSIS — Z71.89 CARE PLAN DISCUSSED WITH PATIENT: ICD-10-CM

## 2024-04-10 DIAGNOSIS — Z79.811 ENCOUNTER FOR MONITORING AROMATASE INHIBITOR THERAPY: ICD-10-CM

## 2024-04-10 DIAGNOSIS — I89.0 LYMPHEDEMA OF UPPER EXTREMITY: Primary | ICD-10-CM

## 2024-04-10 DIAGNOSIS — C50.912 INFILTRATING DUCTAL CARCINOMA OF BREAST, LEFT (HCC): Primary | ICD-10-CM

## 2024-04-10 DIAGNOSIS — Z51.81 ENCOUNTER FOR MONITORING AROMATASE INHIBITOR THERAPY: ICD-10-CM

## 2024-04-10 LAB
25(OH)D3 SERPL-MCNC: 29.7 NG/ML
ALBUMIN SERPL-MCNC: 4.4 G/DL (ref 3.5–5.2)
ALP SERPL-CCNC: 83 U/L (ref 35–104)
ALT SERPL-CCNC: 34 U/L (ref 9–52)
ANION GAP SERPL CALCULATED.3IONS-SCNC: 9 MMOL/L (ref 7–19)
AST SERPL-CCNC: 34 U/L (ref 14–36)
BASOPHILS # BLD: 0.01 K/UL (ref 0.01–0.08)
BASOPHILS NFR BLD: 0.2 % (ref 0.1–1.2)
BILIRUB SERPL-MCNC: 0.6 MG/DL (ref 0.2–1.3)
BUN SERPL-MCNC: 19 MG/DL (ref 7–17)
CALCIUM SERPL-MCNC: 9.6 MG/DL (ref 8.4–10.2)
CHLORIDE SERPL-SCNC: 98 MMOL/L (ref 98–111)
CO2 SERPL-SCNC: 33 MMOL/L (ref 22–29)
CREAT SERPL-MCNC: 0.7 MG/DL (ref 0.5–1)
EOSINOPHIL # BLD: 0.07 K/UL (ref 0.04–0.54)
EOSINOPHIL NFR BLD: 1.1 % (ref 0.7–7)
ERYTHROCYTE [DISTWIDTH] IN BLOOD BY AUTOMATED COUNT: 12.5 % (ref 11.7–14.4)
GLOBULIN: 2.9 G/DL
GLUCOSE SERPL-MCNC: 95 MG/DL (ref 74–106)
HCT VFR BLD AUTO: 41.9 % (ref 34.1–44.9)
HGB BLD-MCNC: 14.4 G/DL (ref 11.2–15.7)
LYMPHOCYTES # BLD: 1.39 K/UL (ref 1.18–3.74)
LYMPHOCYTES NFR BLD: 21.9 % (ref 19.3–53.1)
MCH RBC QN AUTO: 30.4 PG (ref 25.6–32.2)
MCHC RBC AUTO-ENTMCNC: 34.4 G/DL (ref 32.3–35.5)
MCV RBC AUTO: 88.6 FL (ref 79.4–94.8)
MONOCYTES # BLD: 0.54 K/UL (ref 0.24–0.82)
MONOCYTES NFR BLD: 8.5 % (ref 4.7–12.5)
NEUTROPHILS # BLD: 4.3 K/UL (ref 1.56–6.13)
NEUTS SEG NFR BLD: 67.8 % (ref 34–71.1)
PLATELET # BLD AUTO: 152 K/UL (ref 182–369)
PMV BLD AUTO: 9.9 FL (ref 7.4–10.4)
POTASSIUM SERPL-SCNC: 4.7 MMOL/L (ref 3.5–5.1)
PROT SERPL-MCNC: 7.3 G/DL (ref 6.3–8.2)
RBC # BLD AUTO: 4.73 M/UL (ref 3.93–5.22)
SODIUM SERPL-SCNC: 140 MMOL/L (ref 137–145)
WBC # BLD AUTO: 6.34 K/UL (ref 3.98–10.04)

## 2024-04-10 PROCEDURE — G2211 COMPLEX E/M VISIT ADD ON: HCPCS | Performed by: NURSE PRACTITIONER

## 2024-04-10 PROCEDURE — G8417 CALC BMI ABV UP PARAM F/U: HCPCS | Performed by: NURSE PRACTITIONER

## 2024-04-10 PROCEDURE — 3077F SYST BP >= 140 MM HG: CPT | Performed by: NURSE PRACTITIONER

## 2024-04-10 PROCEDURE — 1090F PRES/ABSN URINE INCON ASSESS: CPT | Performed by: NURSE PRACTITIONER

## 2024-04-10 PROCEDURE — 80053 COMPREHEN METABOLIC PANEL: CPT

## 2024-04-10 PROCEDURE — G8400 PT W/DXA NO RESULTS DOC: HCPCS | Performed by: NURSE PRACTITIONER

## 2024-04-10 PROCEDURE — 1123F ACP DISCUSS/DSCN MKR DOCD: CPT | Performed by: NURSE PRACTITIONER

## 2024-04-10 PROCEDURE — 99212 OFFICE O/P EST SF 10 MIN: CPT

## 2024-04-10 PROCEDURE — 85025 COMPLETE CBC W/AUTO DIFF WBC: CPT

## 2024-04-10 PROCEDURE — 99214 OFFICE O/P EST MOD 30 MIN: CPT | Performed by: NURSE PRACTITIONER

## 2024-04-10 PROCEDURE — 1036F TOBACCO NON-USER: CPT | Performed by: NURSE PRACTITIONER

## 2024-04-10 PROCEDURE — 3080F DIAST BP >= 90 MM HG: CPT | Performed by: NURSE PRACTITIONER

## 2024-04-10 PROCEDURE — 36415 COLL VENOUS BLD VENIPUNCTURE: CPT

## 2024-04-10 PROCEDURE — G8427 DOCREV CUR MEDS BY ELIG CLIN: HCPCS | Performed by: NURSE PRACTITIONER

## 2024-04-10 RX ORDER — LETROZOLE 2.5 MG/1
2.5 TABLET, FILM COATED ORAL DAILY
Qty: 90 TABLET | Refills: 3 | Status: SHIPPED | OUTPATIENT
Start: 2024-04-10

## 2024-04-10 ASSESSMENT — ENCOUNTER SYMPTOMS: BACK PAIN: 1

## 2024-04-10 NOTE — PROGRESS NOTES
Progress Note      Pt Name: Fior Ying  YOB: 1945  MRN: [id number]    Date of evaluation: 04/10/2024  History Obtained From:  patient, electronic medical record    CHIEF COMPLAINT:    Chief Complaint   Patient presents with    Follow-up     Infiltrating ductal carcinoma of breast, left (HCC)         HISTORY OF PRESENT ILLNESS:    Fior Ying is a 78 y.o.  female who is currently being followed for infiltrating ductal carcinoma of the left breast, ER/RI positive, HER2/ramesh negative, grade 1, 4/26/2021.  She is status post left lumpectomy with sentinel lymph node biopsy followed by adjuvant radiation therapy completed 9/1/2021.  She has had no radiographic imaging to suggest recurrent disease.  Current recommendation is for adjuvant endocrine therapy with letrozole 2.5 mg daily, for anticipated 5 years, July 2026.  Fior returns today in scheduled follow-up for evaluation, side effect monitoring and further treatment recommendations.      Today's clinic visit to include physical assessment, review of systems, any lab or radiographic findings that were available and plan of care are documented below.    ONCOLOGIC HISTORY:   Diagnosis  Infiltrating ductal carcinoma, left breast April 2021  Favor grade 1  ER 70%, RI 77%, Her-2 ramesh-negative, Ki67 8%-low  MammaPrint Luminal type A, low risk  Oncotype DX recurrence score: 6     Treatment Summary  05/18/2021 Tamoxifen 20 mg daily  06/04/2021 Left breast lumpectomy with negative SLNB, 0/8 positive LN   07/02/2021-Switched to Letrozole 2.5mg daily  8/11/2021-9/1/2021-Completed XRT 4256 cGy in 16 fractions to Infiltrating ductal carcinoma to left breast  11/30/2021 Vitamin D/Calcium daily     Cancer History  Fior Ying was first seen by Dr Lindsey on 7/3/2021.  She was referred by Dr. Floyd Garcia for history of left breast IDC.  This was in a screening detected lesion.  4/15/21 Bilateral screening mammogram ([facility] [place]): A stellate

## 2024-04-11 ASSESSMENT — ENCOUNTER SYMPTOMS
VOMITING: 0
SORE THROAT: 0
EYE DISCHARGE: 0
SHORTNESS OF BREATH: 0
BLOOD IN STOOL: 0
DIARRHEA: 0
RESPIRATORY NEGATIVE: 1
EYES NEGATIVE: 1
EYE REDNESS: 0
CONSTIPATION: 0
ABDOMINAL PAIN: 0
COUGH: 0
EYE PAIN: 0
GASTROINTESTINAL NEGATIVE: 1
NAUSEA: 0
WHEEZING: 0

## 2024-08-15 ENCOUNTER — HOSPITAL ENCOUNTER (OUTPATIENT)
Dept: WOMENS IMAGING | Age: 79
Discharge: HOME | End: 2024-08-15
Payer: MEDICARE

## 2024-08-15 VITALS — BODY MASS INDEX: 37.69 KG/M2 | WEIGHT: 193 LBS

## 2024-08-15 DIAGNOSIS — Z85.3 PERSONAL HISTORY OF BREAST CANCER: ICD-10-CM

## 2024-08-15 PROCEDURE — G0279 TOMOSYNTHESIS, MAMMO: HCPCS

## 2024-08-30 DIAGNOSIS — I10 ESSENTIAL HYPERTENSION: ICD-10-CM

## 2024-08-30 DIAGNOSIS — M1A.9XX0 CHRONIC GOUT WITHOUT TOPHUS, UNSPECIFIED CAUSE, UNSPECIFIED SITE: ICD-10-CM

## 2024-08-30 RX ORDER — ALLOPURINOL 100 MG/1
200 TABLET ORAL DAILY
Qty: 180 TABLET | Refills: 0 | Status: SHIPPED | OUTPATIENT
Start: 2024-08-30

## 2024-08-30 RX ORDER — METOPROLOL TARTRATE 25 MG/1
25 TABLET, FILM COATED ORAL 2 TIMES DAILY
Qty: 180 TABLET | Refills: 0 | Status: SHIPPED | OUTPATIENT
Start: 2024-08-30

## 2024-09-30 SDOH — HEALTH STABILITY: PHYSICAL HEALTH: ON AVERAGE, HOW MANY DAYS PER WEEK DO YOU ENGAGE IN MODERATE TO STRENUOUS EXERCISE (LIKE A BRISK WALK)?: 3 DAYS

## 2024-09-30 SDOH — ECONOMIC STABILITY: TRANSPORTATION INSECURITY
IN THE PAST 12 MONTHS, HAS LACK OF TRANSPORTATION KEPT YOU FROM MEETINGS, WORK, OR FROM GETTING THINGS NEEDED FOR DAILY LIVING?: NO

## 2024-09-30 SDOH — ECONOMIC STABILITY: FOOD INSECURITY: WITHIN THE PAST 12 MONTHS, THE FOOD YOU BOUGHT JUST DIDN'T LAST AND YOU DIDN'T HAVE MONEY TO GET MORE.: NEVER TRUE

## 2024-09-30 SDOH — HEALTH STABILITY: PHYSICAL HEALTH: ON AVERAGE, HOW MANY MINUTES DO YOU ENGAGE IN EXERCISE AT THIS LEVEL?: 20 MIN

## 2024-09-30 SDOH — ECONOMIC STABILITY: FOOD INSECURITY: WITHIN THE PAST 12 MONTHS, YOU WORRIED THAT YOUR FOOD WOULD RUN OUT BEFORE YOU GOT MONEY TO BUY MORE.: NEVER TRUE

## 2024-09-30 SDOH — ECONOMIC STABILITY: INCOME INSECURITY: HOW HARD IS IT FOR YOU TO PAY FOR THE VERY BASICS LIKE FOOD, HOUSING, MEDICAL CARE, AND HEATING?: NOT HARD AT ALL

## 2024-09-30 ASSESSMENT — PATIENT HEALTH QUESTIONNAIRE - PHQ9
2. FEELING DOWN, DEPRESSED OR HOPELESS: NOT AT ALL
SUM OF ALL RESPONSES TO PHQ QUESTIONS 1-9: 0
SUM OF ALL RESPONSES TO PHQ9 QUESTIONS 1 & 2: 0
SUM OF ALL RESPONSES TO PHQ QUESTIONS 1-9: 0
1. LITTLE INTEREST OR PLEASURE IN DOING THINGS: NOT AT ALL

## 2024-09-30 ASSESSMENT — LIFESTYLE VARIABLES
HOW OFTEN DO YOU HAVE A DRINK CONTAINING ALCOHOL: NEVER
HOW OFTEN DO YOU HAVE A DRINK CONTAINING ALCOHOL: 1
HOW MANY STANDARD DRINKS CONTAINING ALCOHOL DO YOU HAVE ON A TYPICAL DAY: 0
HOW OFTEN DO YOU HAVE SIX OR MORE DRINKS ON ONE OCCASION: 1
HOW MANY STANDARD DRINKS CONTAINING ALCOHOL DO YOU HAVE ON A TYPICAL DAY: PATIENT DOES NOT DRINK

## 2024-10-03 ENCOUNTER — OFFICE VISIT (OUTPATIENT)
Dept: PRIMARY CARE CLINIC | Age: 79
End: 2024-10-03

## 2024-10-03 VITALS
BODY MASS INDEX: 37.46 KG/M2 | HEIGHT: 60 IN | DIASTOLIC BLOOD PRESSURE: 85 MMHG | SYSTOLIC BLOOD PRESSURE: 137 MMHG | HEART RATE: 64 BPM | WEIGHT: 190.8 LBS | TEMPERATURE: 98.8 F | OXYGEN SATURATION: 99 %

## 2024-10-03 DIAGNOSIS — E78.2 MIXED HYPERLIPIDEMIA: Chronic | ICD-10-CM

## 2024-10-03 DIAGNOSIS — I10 ESSENTIAL HYPERTENSION: ICD-10-CM

## 2024-10-03 DIAGNOSIS — Z23 NEED FOR IMMUNIZATION AGAINST INFLUENZA: ICD-10-CM

## 2024-10-03 DIAGNOSIS — Z00.00 MEDICARE ANNUAL WELLNESS VISIT, SUBSEQUENT: Primary | ICD-10-CM

## 2024-10-03 RX ORDER — LISINOPRIL 40 MG/1
40 TABLET ORAL DAILY
Qty: 90 TABLET | Refills: 1 | Status: SHIPPED | OUTPATIENT
Start: 2024-10-03

## 2024-10-03 RX ORDER — HYDROCHLOROTHIAZIDE 25 MG/1
25 TABLET ORAL EVERY MORNING
Qty: 30 TABLET | Refills: 2 | Status: SHIPPED | OUTPATIENT
Start: 2024-10-03

## 2024-10-03 NOTE — PROGRESS NOTES
Medicare Annual Wellness Visit    Fior Ying is here for Medicare AWV    Assessment & Plan   Medicare annual wellness visit, subsequent  Essential hypertension  -     hydroCHLOROthiazide (HYDRODIURIL) 25 MG tablet; Take 1 tablet by mouth every morning, Disp-30 tablet, R-2Normal  -     lisinopril (PRINIVIL;ZESTRIL) 40 MG tablet; Take 1 tablet by mouth daily, Disp-90 tablet, R-1Normal  -     CBC with Auto Differential; Future  -     Comprehensive Metabolic Panel; Future  Mixed hyperlipidemia  -     Lipid Panel; Future  Need for immunization against influenza  -     Influenza, FLUAD Trivalent, (age 65 y+), IM, Preservative Free, 0.5mL  Recommendations for Preventive Services Due: see orders and patient instructions/AVS.  Recommended screening schedule for the next 5-10 years is provided to the patient in written form: see Patient Instructions/AVS.     Return in about 6 months (around 4/3/2025) for htn, gout, hyperlipidemia.     Subjective       Patient's complete Health Risk Assessment and screening values have been reviewed and are found in Flowsheets. The following problems were reviewed today and where indicated follow up appointments were made and/or referrals ordered.    Positive Risk Factor Screenings with Interventions:                  Abnormal BMI (obese):  Body mass index is 37.26 kg/m². (!) Abnormal  Interventions:  Patient comments: is planning to walk more.              Advanced Directives:  Do you have a Living Will?: (!) No    Intervention:  Will discuss her wishes with her sons                     Objective   Vitals:    10/03/24 1044 10/03/24 1121   BP: (!) 140/100 137/85   Pulse: 64    Temp: 98.8 °F (37.1 °C)    SpO2: 99%    Weight: 86.5 kg (190 lb 12.8 oz)    Height: 1.524 m (5')       Body mass index is 37.26 kg/m².                  No Known Allergies  Prior to Visit Medications    Medication Sig Taking? Authorizing Provider   hydroCHLOROthiazide (HYDRODIURIL) 25 MG tablet Take 1 tablet by

## 2024-10-03 NOTE — PROGRESS NOTES
After obtaining consent, and per orders of Cecilia BO, injection of Fluad Influenza Injection given in Right deltoid by [facility]. Patient signed consent scanned into patients chart.

## 2024-10-08 ENCOUNTER — TELEPHONE (OUTPATIENT)
Dept: HEMATOLOGY | Age: 79
End: 2024-10-08

## 2024-10-08 NOTE — TELEPHONE ENCOUNTER
I called patient and reminded patient of their appt on 10/10/24 and patient confirmed they would be here. I also let patient know that we have moved into our new cancer facility and asked patient if they were aware of where we were now located, and patient voiced understanding of our new location. Patient knows not to arrive early and that we will get labs at the time of the follow up appointment and not the lab appointment time.

## 2024-10-09 DIAGNOSIS — C50.912 INFILTRATING DUCTAL CARCINOMA OF BREAST, LEFT (HCC): Primary | ICD-10-CM

## 2024-10-09 DIAGNOSIS — E55.9 VITAMIN D DEFICIENCY: ICD-10-CM

## 2024-10-10 ENCOUNTER — OFFICE VISIT (OUTPATIENT)
Dept: HEMATOLOGY | Age: 79
End: 2024-10-10
Payer: MEDICARE

## 2024-10-10 ENCOUNTER — HOSPITAL ENCOUNTER (OUTPATIENT)
Dept: INFUSION THERAPY | Age: 79
Discharge: HOME | End: 2024-10-10
Payer: MEDICARE

## 2024-10-10 VITALS
DIASTOLIC BLOOD PRESSURE: 72 MMHG | TEMPERATURE: 98.2 F | SYSTOLIC BLOOD PRESSURE: 132 MMHG | BODY MASS INDEX: 36.89 KG/M2 | HEART RATE: 64 BPM | OXYGEN SATURATION: 99 % | HEIGHT: 60 IN | WEIGHT: 187.9 LBS

## 2024-10-10 DIAGNOSIS — Z79.811 ENCOUNTER FOR MONITORING AROMATASE INHIBITOR THERAPY: ICD-10-CM

## 2024-10-10 DIAGNOSIS — C50.912 INFILTRATING DUCTAL CARCINOMA OF BREAST, LEFT (HCC): Primary | ICD-10-CM

## 2024-10-10 DIAGNOSIS — M85.89 OSTEOPENIA OF MULTIPLE SITES: ICD-10-CM

## 2024-10-10 DIAGNOSIS — Z51.81 ENCOUNTER FOR MONITORING AROMATASE INHIBITOR THERAPY: ICD-10-CM

## 2024-10-10 DIAGNOSIS — I89.0 LYMPHEDEMA OF UPPER EXTREMITY: ICD-10-CM

## 2024-10-10 DIAGNOSIS — E55.9 VITAMIN D DEFICIENCY: ICD-10-CM

## 2024-10-10 DIAGNOSIS — C50.912 INFILTRATING DUCTAL CARCINOMA OF BREAST, LEFT (HCC): ICD-10-CM

## 2024-10-10 LAB
25(OH)D3 SERPL-MCNC: 33.6 NG/ML
ALBUMIN SERPL-MCNC: 4.3 G/DL (ref 3.5–5.2)
ALP SERPL-CCNC: 93 U/L (ref 35–104)
ALT SERPL-CCNC: 33 U/L (ref 5–33)
ANION GAP SERPL CALCULATED.3IONS-SCNC: 10 MMOL/L (ref 7–19)
AST SERPL-CCNC: 27 U/L (ref 5–32)
BASOPHILS # BLD: 0.02 K/UL (ref 0.01–0.08)
BASOPHILS NFR BLD: 0.3 % (ref 0.1–1.2)
BILIRUB SERPL-MCNC: 0.7 MG/DL (ref 0.2–1.2)
BUN SERPL-MCNC: 24 MG/DL (ref 8–23)
CALCIUM SERPL-MCNC: 9.8 MG/DL (ref 8.8–10.2)
CHLORIDE SERPL-SCNC: 96 MMOL/L (ref 98–111)
CO2 SERPL-SCNC: 31 MMOL/L (ref 22–29)
CREAT SERPL-MCNC: 0.8 MG/DL (ref 0.5–0.9)
EOSINOPHIL # BLD: 0.04 K/UL (ref 0.04–0.54)
EOSINOPHIL NFR BLD: 0.6 % (ref 0.7–7)
ERYTHROCYTE [DISTWIDTH] IN BLOOD BY AUTOMATED COUNT: 12.6 % (ref 11.7–14.4)
GLUCOSE SERPL-MCNC: 146 MG/DL (ref 70–99)
HCT VFR BLD AUTO: 43.4 % (ref 34.1–44.9)
HGB BLD-MCNC: 15 G/DL (ref 11.2–15.7)
LYMPHOCYTES # BLD: 1.52 K/UL (ref 1.18–3.74)
LYMPHOCYTES NFR BLD: 23 % (ref 19.3–53.1)
MCH RBC QN AUTO: 30.7 PG (ref 25.6–32.2)
MCHC RBC AUTO-ENTMCNC: 34.6 G/DL (ref 32.3–35.5)
MCV RBC AUTO: 88.9 FL (ref 79.4–94.8)
MONOCYTES # BLD: 0.48 K/UL (ref 0.24–0.82)
MONOCYTES NFR BLD: 7.3 % (ref 4.7–12.5)
NEUTROPHILS # BLD: 4.53 K/UL (ref 1.56–6.13)
NEUTS SEG NFR BLD: 68.5 % (ref 34–71.1)
PLATELET # BLD AUTO: 175 K/UL (ref 182–369)
PMV BLD AUTO: 10.6 FL (ref 7.4–10.4)
POTASSIUM SERPL-SCNC: 4 MMOL/L (ref 3.5–5)
PROT SERPL-MCNC: 7.1 G/DL (ref 6.4–8.3)
RBC # BLD AUTO: 4.88 M/UL (ref 3.93–5.22)
SODIUM SERPL-SCNC: 137 MMOL/L (ref 136–145)
WBC # BLD AUTO: 6.61 K/UL (ref 3.98–10.04)

## 2024-10-10 PROCEDURE — 99212 OFFICE O/P EST SF 10 MIN: CPT

## 2024-10-10 PROCEDURE — 36415 COLL VENOUS BLD VENIPUNCTURE: CPT

## 2024-10-10 PROCEDURE — 85025 COMPLETE CBC W/AUTO DIFF WBC: CPT

## 2024-10-10 RX ORDER — LETROZOLE 2.5 MG/1
2.5 TABLET, FILM COATED ORAL DAILY
Qty: 90 TABLET | Refills: 3 | Status: SHIPPED | OUTPATIENT
Start: 2024-10-10

## 2024-10-10 NOTE — PROGRESS NOTES
Progress Note      Pt Name: Fior Ying  YOB: 1945  MRN: [id number]    Date of evaluation: 10/10/2024  History Obtained From:  patient, electronic medical record    CHIEF COMPLAINT:    Chief Complaint   Patient presents with    Follow-up     Malignant neoplasm of lower-outer quadrant of left breast of female, estrogen receptor positive  Lymphedema of upper extremity     HISTORY OF PRESENT ILLNESS:    Fior Ying is a 79 y.o.  female who is currently being followed for infiltrating ductal carcinoma of the left breast, ER/AL positive, HER2/ramesh negative, grade 1, 4/26/2021.  She is status post left lumpectomy with sentinel lymph node biopsy followed by adjuvant radiation therapy completed 9/1/2021.  She has had no radiographic imaging to suggest recurrent disease.  Current recommendation is for adjuvant endocrine therapy with letrozole 2.5 mg daily, for anticipated 5 years, July 2026.  Fior returns today in scheduled follow-up for evaluation, side effect monitoring and further treatment recommendations.      Today's clinic visit to include physical assessment, review of systems, any lab or radiographic findings that were available and plan of care are documented below.    ONCOLOGIC HISTORY:   Diagnosis  Infiltrating ductal carcinoma, left breast April 2021  Favor grade 1  ER 70%, AL 77%, Her-2 ramesh-negative, Ki67 8%-low  MammaPrint Luminal type A, low risk  Oncotype DX recurrence score: 6     Treatment Summary  05/18/2021 Tamoxifen 20 mg daily  06/04/2021 Left breast lumpectomy with negative SLNB, 0/8 positive LN   07/02/2021-Switched to Letrozole 2.5mg daily  8/11/2021-9/1/2021-Completed XRT 4256 cGy in 16 fractions to Infiltrating ductal carcinoma to left breast  11/30/2021 Vitamin D/Calcium daily     Cancer History  Fior Ying was first seen by Dr Lindsey on 7/3/2021.  She was referred by Dr. Floyd Garcia for history of left breast IDC.  This was in a screening detected  PRBE, BILATERAL PEC BLOCK performed by Floyd Lobato MD at [facility] OR    BREAST SURGERY Right 06/04/2021    EXCISION RIGHT BREAST LESION WITH NEEDLE LOCALIZATION performed by Floyd Lobato MD at [facility] OR    COLONOSCOPY      HYSTERECTOMY (CERVIX STATUS UNKNOWN)      HYSTERECTOMY (CERVIX STATUS UNKNOWN)  2013    KNEE SURGERY      bilateral replacement    RECTAL SURGERY  08/24/2012    Rigid proctoscopy with transanal excision of rectal polyp    SKIN CANCER EXCISION  04/2022    TUBAL LIGATION  1984    US BREAST BIOPSY W LOC DEVICE 1ST LESION LEFT Left 04/26/2021    US BREAST NEEDLE BIOPSY LEFT 4/26/2021 [facility]GERY    US BREAST BIOPSY W LOC DEVICE 1ST LESION RIGHT Right 06/02/2021    US BREAST NEEDLE BIOPSY RIGHT 6/2/2021 [facility]AL SURGERY     Current Medications:    Current Outpatient Medications   Medication Sig Dispense Refill    hydroCHLOROthiazide (HYDRODIURIL) 25 MG tablet Take 1 tablet by mouth every morning 30 tablet 2    lisinopril (PRINIVIL;ZESTRIL) 40 MG tablet Take 1 tablet by mouth daily 90 tablet 1    allopurinol (ZYLOPRIM) 100 MG tablet Take 2 tablets by mouth once daily 180 tablet 0    metoprolol tartrate (LOPRESSOR) 25 MG tablet Take 1 tablet by mouth twice daily 180 tablet 0    letrozole (FEMARA) 2.5 MG tablet Take 1 tablet by mouth daily 90 tablet 3    lovastatin (MEVACOR) 40 MG tablet Take 1 tablet by mouth nightly 90 tablet 1    vitamin D 25 MCG (1000 UT) CAPS Take 25 mcg by mouth daily      Calcium Carbonate-Vit D-Min (CALCIUM 1200) 3838-4880 MG-UNIT CHEW Take 1 tablet by mouth daily       Compression Stockings MISC by Does not apply route Low to medium compression stocking just below knee (Patient taking differently: by Does not apply route Low to medium compression stocking just below knee wears occasionally) 1 each 0    multivitamin (THERAGRAN) per tablet Take 1 tablet by mouth daily      aspirin 81 MG EC tablet Take 1 tablet by mouth daily      fish oil-omega-3 fatty acids 1000 MG

## 2024-10-16 DIAGNOSIS — E78.2 MIXED HYPERLIPIDEMIA: Chronic | ICD-10-CM

## 2024-10-16 DIAGNOSIS — I10 ESSENTIAL HYPERTENSION: ICD-10-CM

## 2024-10-16 LAB
ALBUMIN SERPL-MCNC: 4.4 G/DL (ref 3.5–5.2)
ALP SERPL-CCNC: 91 U/L (ref 35–104)
ALT SERPL-CCNC: 26 U/L (ref 5–33)
ANION GAP SERPL CALCULATED.3IONS-SCNC: 12 MMOL/L (ref 7–19)
AST SERPL-CCNC: 27 U/L (ref 5–32)
BASOPHILS # BLD: 0 K/UL (ref 0–0.2)
BASOPHILS NFR BLD: 0.5 % (ref 0–1)
BILIRUB SERPL-MCNC: 0.6 MG/DL (ref 0.2–1.2)
BUN SERPL-MCNC: 23 MG/DL (ref 8–23)
CALCIUM SERPL-MCNC: 9.4 MG/DL (ref 8.8–10.2)
CHLORIDE SERPL-SCNC: 101 MMOL/L (ref 98–111)
CHOLEST SERPL-MCNC: 201 MG/DL (ref 0–199)
CO2 SERPL-SCNC: 29 MMOL/L (ref 22–29)
CREAT SERPL-MCNC: 0.7 MG/DL (ref 0.5–0.9)
EOSINOPHIL # BLD: 0.1 K/UL (ref 0–0.6)
EOSINOPHIL NFR BLD: 1.8 % (ref 0–5)
ERYTHROCYTE [DISTWIDTH] IN BLOOD BY AUTOMATED COUNT: 12.6 % (ref 11.5–14.5)
GLUCOSE SERPL-MCNC: 95 MG/DL (ref 70–99)
HCT VFR BLD AUTO: 45.5 % (ref 37–47)
HDLC SERPL-MCNC: 60 MG/DL (ref 40–60)
HGB BLD-MCNC: 15.1 G/DL (ref 12–16)
IMM GRANULOCYTES # BLD: 0 K/UL
LDLC SERPL CALC-MCNC: 107 MG/DL
LYMPHOCYTES # BLD: 1.6 K/UL (ref 1.1–4.5)
LYMPHOCYTES NFR BLD: 23.8 % (ref 20–40)
MCH RBC QN AUTO: 30.8 PG (ref 27–31)
MCHC RBC AUTO-ENTMCNC: 33.2 G/DL (ref 33–37)
MCV RBC AUTO: 92.7 FL (ref 81–99)
MONOCYTES # BLD: 0.4 K/UL (ref 0–0.9)
MONOCYTES NFR BLD: 5.5 % (ref 0–10)
NEUTROPHILS # BLD: 4.5 K/UL (ref 1.5–7.5)
NEUTS SEG NFR BLD: 67.8 % (ref 50–65)
PLATELET # BLD AUTO: 168 K/UL (ref 130–400)
PMV BLD AUTO: 11.2 FL (ref 9.4–12.3)
POTASSIUM SERPL-SCNC: 4.4 MMOL/L (ref 3.5–5)
PROT SERPL-MCNC: 7.2 G/DL (ref 6.4–8.3)
RBC # BLD AUTO: 4.91 M/UL (ref 4.2–5.4)
SODIUM SERPL-SCNC: 142 MMOL/L (ref 136–145)
TRIGL SERPL-MCNC: 168 MG/DL (ref 0–149)
WBC # BLD AUTO: 6.6 K/UL (ref 4.8–10.8)

## 2024-10-16 ASSESSMENT — ENCOUNTER SYMPTOMS
COUGH: 0
RESPIRATORY NEGATIVE: 1
SORE THROAT: 0
DIARRHEA: 0
EYE DISCHARGE: 0
EYES NEGATIVE: 1
GASTROINTESTINAL NEGATIVE: 1
BLOOD IN STOOL: 0
WHEEZING: 0
ABDOMINAL PAIN: 0
EYE REDNESS: 0
CONSTIPATION: 0
BACK PAIN: 0
SHORTNESS OF BREATH: 0
NAUSEA: 0
EYE PAIN: 0
VOMITING: 0

## 2024-10-23 DIAGNOSIS — E78.2 MIXED HYPERLIPIDEMIA: ICD-10-CM

## 2024-10-23 RX ORDER — LOVASTATIN 40 MG/1
40 TABLET ORAL NIGHTLY
Qty: 90 TABLET | Refills: 1 | Status: SHIPPED | OUTPATIENT
Start: 2024-10-23

## 2024-11-29 DIAGNOSIS — I10 ESSENTIAL HYPERTENSION: ICD-10-CM

## 2024-11-29 DIAGNOSIS — M1A.9XX0 CHRONIC GOUT WITHOUT TOPHUS, UNSPECIFIED CAUSE, UNSPECIFIED SITE: ICD-10-CM

## 2024-12-02 RX ORDER — METOPROLOL TARTRATE 25 MG/1
25 TABLET, FILM COATED ORAL 2 TIMES DAILY
Qty: 180 TABLET | Refills: 1 | Status: SHIPPED | OUTPATIENT
Start: 2024-12-02

## 2024-12-02 RX ORDER — ALLOPURINOL 100 MG/1
200 TABLET ORAL DAILY
Qty: 180 TABLET | Refills: 1 | Status: SHIPPED | OUTPATIENT
Start: 2024-12-02

## 2025-01-08 DIAGNOSIS — I10 ESSENTIAL HYPERTENSION: ICD-10-CM

## 2025-01-08 RX ORDER — HYDROCHLOROTHIAZIDE 25 MG/1
25 TABLET ORAL EVERY MORNING
Qty: 30 TABLET | Refills: 3 | Status: SHIPPED | OUTPATIENT
Start: 2025-01-08

## 2025-02-14 NOTE — PROGRESS NOTES
HISTORY OF PRESENT ILLNESS:    Ms. Ying presents today for a 6-month breast exam following left partial mastectomy with left sentinel lymph node biopsy and right excisional biopsy on 6/4/2021.        An ultrasound guided breast biopsy  on the left which revealed a 0.8 cm low grade invasive ductal carcinoma. ER is positive at 70%. ME is positive at 77%. Her2 is negative. Ki67 is low at 8%.     Mammaprint is low risk luminal type A    Bilateral Mammogram-8/15/2024  FINDINGS:  The breast tissue is almost entirely fatty..     Postsurgical changes are seen bilaterally.  There are no focal suspicious masses, areas of architectural distortion, or suspicious calcifications in either breast.     IMPRESSION:  1.  No mammographic evidence of malignancy.     BI-RADS 2:  Benign.     RECOMMENDATION: Diagnostic mammogram Bilateral 12 months           ______________________________________   Electronically signed by: SANDOVAL PINO M.D.    MRI-5/10/2021  Left breast with a 2.4 x 3.5 cm irregular mass with irregular and   angular margins in the middle third, slightly lower outer breast, 5:00   position, 7 cm from the nipple on axial images. Adjacent biopsy   marker. No additional suspicious finding in the left breast.   Right breast with a 0.9 cm focus of enhancement in the middle third,   slightly upper outer breast, 11:00 position, 5.4 cm from the nipple on   sagittal images.   No suspicious axillary or internal mammary adenopathy.   The visualized portion of the chest and abdomen appear within normal   limits considering technique.       Impression   1. Right breast with a 0.9 cm focus of enhancement as described above.   Recommend second look ultrasound.   2. Redemonstration of known left breast cancer, with a greater size of   enhancement than appreciated by ultrasound.   BI-RADS CATEGORY 0: NEED ADDITIONAL EVALUATION AND/OR PRIOR MAMMOGRAMS   FOR COMPARISON.   Management Recommendation: Recall for additional imaging.

## 2025-02-17 ENCOUNTER — OFFICE VISIT (OUTPATIENT)
Dept: SURGERY | Age: 80
End: 2025-02-17
Payer: MEDICARE

## 2025-02-17 VITALS — HEART RATE: 88 BPM | RESPIRATION RATE: 18 BRPM | OXYGEN SATURATION: 97 %

## 2025-02-17 DIAGNOSIS — Z98.890 STATUS POST LEFT BREAST LUMPECTOMY: Primary | ICD-10-CM

## 2025-02-17 DIAGNOSIS — Z17.0 MALIGNANT NEOPLASM OF LOWER-OUTER QUADRANT OF LEFT BREAST OF FEMALE, ESTROGEN RECEPTOR POSITIVE (HCC): Chronic | ICD-10-CM

## 2025-02-17 DIAGNOSIS — C50.512 MALIGNANT NEOPLASM OF LOWER-OUTER QUADRANT OF LEFT BREAST OF FEMALE, ESTROGEN RECEPTOR POSITIVE (HCC): Chronic | ICD-10-CM

## 2025-02-17 PROCEDURE — 1159F MED LIST DOCD IN RCRD: CPT | Performed by: SURGERY

## 2025-02-17 PROCEDURE — G8400 PT W/DXA NO RESULTS DOC: HCPCS | Performed by: SURGERY

## 2025-02-17 PROCEDURE — 1036F TOBACCO NON-USER: CPT | Performed by: SURGERY

## 2025-02-17 PROCEDURE — G8427 DOCREV CUR MEDS BY ELIG CLIN: HCPCS | Performed by: SURGERY

## 2025-02-17 PROCEDURE — G8417 CALC BMI ABV UP PARAM F/U: HCPCS | Performed by: SURGERY

## 2025-02-17 PROCEDURE — 99213 OFFICE O/P EST LOW 20 MIN: CPT | Performed by: SURGERY

## 2025-02-17 PROCEDURE — 1123F ACP DISCUSS/DSCN MKR DOCD: CPT | Performed by: SURGERY

## 2025-02-17 PROCEDURE — 1090F PRES/ABSN URINE INCON ASSESS: CPT | Performed by: SURGERY

## 2025-02-25 DIAGNOSIS — Z85.3 PERSONAL HISTORY OF BREAST CANCER: Primary | ICD-10-CM

## 2025-04-01 SDOH — ECONOMIC STABILITY: TRANSPORTATION INSECURITY
IN THE PAST 12 MONTHS, HAS THE LACK OF TRANSPORTATION KEPT YOU FROM MEDICAL APPOINTMENTS OR FROM GETTING MEDICATIONS?: NO

## 2025-04-01 SDOH — ECONOMIC STABILITY: INCOME INSECURITY: IN THE LAST 12 MONTHS, WAS THERE A TIME WHEN YOU WERE NOT ABLE TO PAY THE MORTGAGE OR RENT ON TIME?: NO

## 2025-04-01 SDOH — ECONOMIC STABILITY: FOOD INSECURITY: WITHIN THE PAST 12 MONTHS, YOU WORRIED THAT YOUR FOOD WOULD RUN OUT BEFORE YOU GOT MONEY TO BUY MORE.: NEVER TRUE

## 2025-04-01 SDOH — ECONOMIC STABILITY: FOOD INSECURITY: WITHIN THE PAST 12 MONTHS, THE FOOD YOU BOUGHT JUST DIDN'T LAST AND YOU DIDN'T HAVE MONEY TO GET MORE.: NEVER TRUE

## 2025-04-01 ASSESSMENT — PATIENT HEALTH QUESTIONNAIRE - PHQ9
SUM OF ALL RESPONSES TO PHQ QUESTIONS 1-9: 0
2. FEELING DOWN, DEPRESSED OR HOPELESS: NOT AT ALL
1. LITTLE INTEREST OR PLEASURE IN DOING THINGS: NOT AT ALL
SUM OF ALL RESPONSES TO PHQ QUESTIONS 1-9: 0
SUM OF ALL RESPONSES TO PHQ9 QUESTIONS 1 & 2: 0
1. LITTLE INTEREST OR PLEASURE IN DOING THINGS: NOT AT ALL
SUM OF ALL RESPONSES TO PHQ QUESTIONS 1-9: 0
SUM OF ALL RESPONSES TO PHQ QUESTIONS 1-9: 0
2. FEELING DOWN, DEPRESSED OR HOPELESS: NOT AT ALL

## 2025-04-04 ENCOUNTER — OFFICE VISIT (OUTPATIENT)
Dept: PRIMARY CARE CLINIC | Age: 80
End: 2025-04-04
Payer: MEDICARE

## 2025-04-04 VITALS
HEART RATE: 64 BPM | BODY MASS INDEX: 37.11 KG/M2 | OXYGEN SATURATION: 98 % | WEIGHT: 189 LBS | HEIGHT: 60 IN | SYSTOLIC BLOOD PRESSURE: 135 MMHG | DIASTOLIC BLOOD PRESSURE: 82 MMHG

## 2025-04-04 DIAGNOSIS — M1A.9XX0 CHRONIC GOUT WITHOUT TOPHUS, UNSPECIFIED CAUSE, UNSPECIFIED SITE: ICD-10-CM

## 2025-04-04 DIAGNOSIS — E78.2 MIXED HYPERLIPIDEMIA: ICD-10-CM

## 2025-04-04 DIAGNOSIS — I10 ESSENTIAL HYPERTENSION: Primary | ICD-10-CM

## 2025-04-04 PROCEDURE — 1036F TOBACCO NON-USER: CPT | Performed by: NURSE PRACTITIONER

## 2025-04-04 PROCEDURE — G8400 PT W/DXA NO RESULTS DOC: HCPCS | Performed by: NURSE PRACTITIONER

## 2025-04-04 PROCEDURE — 1159F MED LIST DOCD IN RCRD: CPT | Performed by: NURSE PRACTITIONER

## 2025-04-04 PROCEDURE — G8427 DOCREV CUR MEDS BY ELIG CLIN: HCPCS | Performed by: NURSE PRACTITIONER

## 2025-04-04 PROCEDURE — 99214 OFFICE O/P EST MOD 30 MIN: CPT | Performed by: NURSE PRACTITIONER

## 2025-04-04 PROCEDURE — G8417 CALC BMI ABV UP PARAM F/U: HCPCS | Performed by: NURSE PRACTITIONER

## 2025-04-04 PROCEDURE — 1160F RVW MEDS BY RX/DR IN RCRD: CPT | Performed by: NURSE PRACTITIONER

## 2025-04-04 PROCEDURE — 1090F PRES/ABSN URINE INCON ASSESS: CPT | Performed by: NURSE PRACTITIONER

## 2025-04-04 PROCEDURE — 3079F DIAST BP 80-89 MM HG: CPT | Performed by: NURSE PRACTITIONER

## 2025-04-04 PROCEDURE — 1123F ACP DISCUSS/DSCN MKR DOCD: CPT | Performed by: NURSE PRACTITIONER

## 2025-04-04 PROCEDURE — 3075F SYST BP GE 130 - 139MM HG: CPT | Performed by: NURSE PRACTITIONER

## 2025-04-04 RX ORDER — HYDROCHLOROTHIAZIDE 25 MG/1
25 TABLET ORAL EVERY MORNING
Qty: 90 TABLET | Refills: 3 | Status: SHIPPED | OUTPATIENT
Start: 2025-04-04

## 2025-04-04 RX ORDER — LOVASTATIN 40 MG/1
40 TABLET ORAL NIGHTLY
Qty: 90 TABLET | Refills: 3 | Status: SHIPPED | OUTPATIENT
Start: 2025-04-04

## 2025-04-04 RX ORDER — ALLOPURINOL 100 MG/1
200 TABLET ORAL DAILY
Qty: 180 TABLET | Refills: 3 | Status: SHIPPED | OUTPATIENT
Start: 2025-04-04

## 2025-04-04 RX ORDER — LISINOPRIL 40 MG/1
40 TABLET ORAL DAILY
Qty: 90 TABLET | Refills: 3 | Status: SHIPPED | OUTPATIENT
Start: 2025-04-04

## 2025-04-04 RX ORDER — METOPROLOL TARTRATE 25 MG/1
25 TABLET, FILM COATED ORAL 2 TIMES DAILY
Qty: 180 TABLET | Refills: 3 | Status: SHIPPED | OUTPATIENT
Start: 2025-04-04

## 2025-04-04 ASSESSMENT — ENCOUNTER SYMPTOMS
VOMITING: 0
ABDOMINAL PAIN: 0
COUGH: 0
NAUSEA: 0
CHEST TIGHTNESS: 0
DIARRHEA: 0
COLOR CHANGE: 0
SORE THROAT: 0
SHORTNESS OF BREATH: 0

## 2025-04-04 NOTE — PROGRESS NOTES
[address] [place] [address] Drive   [address] [facility], [address]     Phone:  [phone]  Fax:  [phone]      Fior Ying is a 79 y.o. female who presents today for her medical conditions/complaints as noted below.  Fior Ying is c/o of Follow-up, Hypertension, and Hyperlipidemia      Chief Complaint   Patient presents with    Follow-up    Hypertension    Hyperlipidemia       HPI:     History of Present Illness  The patient presents for a routine checkup.    She reports excitement and some anxiety about an upcoming trip to visit her family during her daughter's spring break. Concerns about potential travel issues, such as parking and using a credit card at the airport, were expressed. She has not flown in 20 years and is apprehensive about navigating the airport and flight procedures.     Blood pressure management has been effective, with the most recent reading being 135/82. She takes hydrochlorothiazide every other day and has confirmed she has enough medication to last through her trip.     Recent activities included house cleaning and yard work in preparation for her grandson's visit in March 2025. She expressed satisfaction with her ability to manage these tasks independently.    The patient mentioned the passing of her , Shamir, and the adjustments she has made since his death, including managing farm records and taxes. She has received support from her community, which has helped her navigate these responsibilities.    Denies any cholesterol or gout concerns.          Past Medical History:   Diagnosis Date    Breast cancer 04/2021     Left IDC    Cancer (HCC)     breast    Gout     Hyperlipidemia     Hypertension     Obesity Don’t know    Osteoarthritis     Squamous cell carcinoma of face     Vertigo         Past Surgical History:   Procedure Laterality Date    ANUS SURGERY      polyp removed    BREAST BIOPSY      Right-benign    BREAST LUMPECTOMY Left 06/04/2021    LEFT LUMPECTOMY AND

## 2025-05-20 DIAGNOSIS — C50.912 INFILTRATING DUCTAL CARCINOMA OF BREAST, LEFT (HCC): Primary | ICD-10-CM

## 2025-05-21 NOTE — PROGRESS NOTES
Progress Note      Pt Name: Fior Ying  YOB: 1945  MRN: [id number]    Date of evaluation: 06/04/2025  History Obtained From:  patient, electronic medical record    CHIEF COMPLAINT:    Chief Complaint   Patient presents with    Follow-up     Infiltrating ductal carcinoma of breast, left (HCC)     HISTORY OF PRESENT ILLNESS:    Fior Ying is a 79 y.o.  female who is currently being followed for infiltrating ductal carcinoma of the left breast, ER 70% and VA 77% positive, HER2/ramesh negative, grade 1, 4/26/2021.  She is status post left lumpectomy with sentinel lymph node biopsy followed by adjuvant radiation therapy completed 9/1/2021.  She has had no radiographic imaging to suggest recurrent disease.  Current recommendation is for adjuvant endocrine therapy with letrozole 2.5 mg daily, for anticipated 5 years, July 2026.  Fior returns today in scheduled follow-up for evaluation, side effect monitoring and further treatment recommendations.      History of Present Illness  She reports no new health concerns since her last visit. She is tolerating letrozole well, with no exacerbation of hot flashes or joint pain. She engages in yard work and gardening activities, which she believes may be contributing to her joint discomfort. She has an upcoming appointment with Dr. Lobato on 08/20/2025 and does not require a refill of letrozole until 08/2025. She is compliant with wearing her compression sleeve during physical activities such as yard work, although she does not typically wear it to [employer] or doctor's appointments.      Today's clinic visit to include physical assessment, review of systems, any lab or radiographic findings that were available and plan of care are documented below.    ONCOLOGIC HISTORY:   Diagnosis  Infiltrating ductal carcinoma, left breast April 2021  Favor grade 1  ER 70%, VA 77%, Her-2 ramesh-negative, Ki67 8%-low  MammaPrint Luminal type A, low risk  Oncotype

## 2025-05-30 ENCOUNTER — TELEPHONE (OUTPATIENT)
Dept: HEMATOLOGY | Age: 80
End: 2025-05-30

## 2025-05-30 NOTE — TELEPHONE ENCOUNTER
Called pt. to remind them of appointment on 06/04/2025 and had to leave a detailed voicemail with appointment date and time. Reminded patient to just come at appointment time, and to not come at the lab appointment time. We have now moved to the [facility] [place] that is located between our old office and the ER at the [facility]. Letting the Pt know that our front entrance faces the [place] and leaving our address. Reminded pt to eat well and be well hydrated for their labs.

## 2025-06-04 ENCOUNTER — HOSPITAL ENCOUNTER (OUTPATIENT)
Dept: INFUSION THERAPY | Age: 80
Discharge: HOME | End: 2025-06-04
Payer: MEDICARE

## 2025-06-04 ENCOUNTER — OFFICE VISIT (OUTPATIENT)
Dept: HEMATOLOGY | Age: 80
End: 2025-06-04
Payer: MEDICARE

## 2025-06-04 VITALS
TEMPERATURE: 97.8 F | DIASTOLIC BLOOD PRESSURE: 84 MMHG | SYSTOLIC BLOOD PRESSURE: 130 MMHG | OXYGEN SATURATION: 95 % | WEIGHT: 190.9 LBS | BODY MASS INDEX: 37.48 KG/M2 | HEART RATE: 68 BPM | HEIGHT: 60 IN

## 2025-06-04 DIAGNOSIS — I89.0 LYMPHEDEMA OF UPPER EXTREMITY: ICD-10-CM

## 2025-06-04 DIAGNOSIS — Z71.89 CARE PLAN DISCUSSED WITH PATIENT: ICD-10-CM

## 2025-06-04 DIAGNOSIS — Z51.81 ENCOUNTER FOR MONITORING AROMATASE INHIBITOR THERAPY: ICD-10-CM

## 2025-06-04 DIAGNOSIS — M85.89 OSTEOPENIA OF MULTIPLE SITES: ICD-10-CM

## 2025-06-04 DIAGNOSIS — C50.912 INFILTRATING DUCTAL CARCINOMA OF BREAST, LEFT (HCC): Primary | ICD-10-CM

## 2025-06-04 DIAGNOSIS — Z79.811 ENCOUNTER FOR MONITORING AROMATASE INHIBITOR THERAPY: ICD-10-CM

## 2025-06-04 DIAGNOSIS — C50.912 INFILTRATING DUCTAL CARCINOMA OF BREAST, LEFT (HCC): ICD-10-CM

## 2025-06-04 LAB
ALBUMIN SERPL-MCNC: 4.2 G/DL (ref 3.5–5.2)
ALP SERPL-CCNC: 80 U/L (ref 35–104)
ALT SERPL-CCNC: 26 U/L (ref 5–33)
ANION GAP SERPL CALCULATED.3IONS-SCNC: 11 MMOL/L (ref 7–19)
AST SERPL-CCNC: 30 U/L (ref 5–32)
BASOPHILS # BLD: 0.01 K/UL (ref 0–0.2)
BASOPHILS NFR BLD: 0.1 % (ref 0–1)
BILIRUB SERPL-MCNC: 0.6 MG/DL (ref 0–1.2)
BUN SERPL-MCNC: 24 MG/DL (ref 8–23)
CALCIUM SERPL-MCNC: 9.5 MG/DL (ref 8.8–10.2)
CHLORIDE SERPL-SCNC: 99 MMOL/L (ref 98–107)
CO2 SERPL-SCNC: 29 MMOL/L (ref 22–29)
CREAT SERPL-MCNC: 0.9 MG/DL (ref 0.5–0.9)
EOSINOPHIL # BLD: 0.14 K/UL (ref 0–0.6)
EOSINOPHIL NFR BLD: 2.1 % (ref 0–5)
ERYTHROCYTE [DISTWIDTH] IN BLOOD BY AUTOMATED COUNT: 12.6 % (ref 11.5–14.5)
GLUCOSE SERPL-MCNC: 100 MG/DL (ref 70–99)
HCT VFR BLD AUTO: 39.8 % (ref 37–47)
HGB BLD-MCNC: 13.9 G/DL (ref 12–16)
LYMPHOCYTES # BLD: 1.7 K/UL (ref 1.1–4.5)
LYMPHOCYTES NFR BLD: 25.2 % (ref 20–40)
MCH RBC QN AUTO: 31.4 PG (ref 27–31)
MCHC RBC AUTO-ENTMCNC: 34.9 G/DL (ref 33–37)
MCV RBC AUTO: 90 FL (ref 81–99)
MONOCYTES # BLD: 0.52 K/UL (ref 0–0.9)
MONOCYTES NFR BLD: 7.7 % (ref 1–10)
NEUTROPHILS # BLD: 4.36 K/UL (ref 1.5–7.5)
NEUTS SEG NFR BLD: 64.6 % (ref 50–65)
PLATELET # BLD AUTO: 178 K/UL (ref 130–400)
PMV BLD AUTO: 10 FL (ref 9.4–12.3)
POTASSIUM SERPL-SCNC: 4.5 MMOL/L (ref 3.5–5.1)
PROT SERPL-MCNC: 6.9 G/DL (ref 6.4–8.3)
RBC # BLD AUTO: 4.42 M/UL (ref 4.2–5.4)
SODIUM SERPL-SCNC: 139 MMOL/L (ref 136–145)
WBC # BLD AUTO: 6.75 K/UL (ref 4.8–10.8)

## 2025-06-04 PROCEDURE — 1090F PRES/ABSN URINE INCON ASSESS: CPT | Performed by: NURSE PRACTITIONER

## 2025-06-04 PROCEDURE — 99214 OFFICE O/P EST MOD 30 MIN: CPT | Performed by: NURSE PRACTITIONER

## 2025-06-04 PROCEDURE — G2211 COMPLEX E/M VISIT ADD ON: HCPCS | Performed by: NURSE PRACTITIONER

## 2025-06-04 PROCEDURE — 36415 COLL VENOUS BLD VENIPUNCTURE: CPT

## 2025-06-04 PROCEDURE — G8417 CALC BMI ABV UP PARAM F/U: HCPCS | Performed by: NURSE PRACTITIONER

## 2025-06-04 PROCEDURE — 1123F ACP DISCUSS/DSCN MKR DOCD: CPT | Performed by: NURSE PRACTITIONER

## 2025-06-04 PROCEDURE — 80053 COMPREHEN METABOLIC PANEL: CPT

## 2025-06-04 PROCEDURE — 1159F MED LIST DOCD IN RCRD: CPT | Performed by: NURSE PRACTITIONER

## 2025-06-04 PROCEDURE — 1126F AMNT PAIN NOTED NONE PRSNT: CPT | Performed by: NURSE PRACTITIONER

## 2025-06-04 PROCEDURE — G8400 PT W/DXA NO RESULTS DOC: HCPCS | Performed by: NURSE PRACTITIONER

## 2025-06-04 PROCEDURE — 99213 OFFICE O/P EST LOW 20 MIN: CPT

## 2025-06-04 PROCEDURE — 3075F SYST BP GE 130 - 139MM HG: CPT | Performed by: NURSE PRACTITIONER

## 2025-06-04 PROCEDURE — 1036F TOBACCO NON-USER: CPT | Performed by: NURSE PRACTITIONER

## 2025-06-04 PROCEDURE — G8427 DOCREV CUR MEDS BY ELIG CLIN: HCPCS | Performed by: NURSE PRACTITIONER

## 2025-06-04 PROCEDURE — 3079F DIAST BP 80-89 MM HG: CPT | Performed by: NURSE PRACTITIONER

## 2025-06-04 PROCEDURE — 85025 COMPLETE CBC W/AUTO DIFF WBC: CPT

## 2025-06-07 ASSESSMENT — ENCOUNTER SYMPTOMS
ABDOMINAL PAIN: 0
EYE REDNESS: 0
DIARRHEA: 0
GASTROINTESTINAL NEGATIVE: 1
SORE THROAT: 0
WHEEZING: 0
NAUSEA: 0
EYE PAIN: 0
RESPIRATORY NEGATIVE: 1
BACK PAIN: 0
VOMITING: 0
EYE DISCHARGE: 0
SHORTNESS OF BREATH: 0
CONSTIPATION: 0
BLOOD IN STOOL: 0
COUGH: 0
EYES NEGATIVE: 1

## 2025-08-20 ENCOUNTER — OFFICE VISIT (OUTPATIENT)
Dept: SURGERY | Age: 80
End: 2025-08-20
Payer: MEDICARE

## 2025-08-20 ENCOUNTER — HOSPITAL ENCOUNTER (OUTPATIENT)
Dept: WOMENS IMAGING | Age: 80
Discharge: HOME | End: 2025-08-20
Payer: MEDICARE

## 2025-08-20 VITALS — HEART RATE: 70 BPM | OXYGEN SATURATION: 96 % | HEIGHT: 60 IN | BODY MASS INDEX: 37.73 KG/M2 | WEIGHT: 192.2 LBS

## 2025-08-20 DIAGNOSIS — Z98.890 STATUS POST LEFT BREAST LUMPECTOMY: Primary | ICD-10-CM

## 2025-08-20 DIAGNOSIS — Z85.3 HISTORY OF BREAST CANCER: ICD-10-CM

## 2025-08-20 DIAGNOSIS — C50.512 MALIGNANT NEOPLASM OF LOWER-OUTER QUADRANT OF LEFT BREAST OF FEMALE, ESTROGEN RECEPTOR POSITIVE (HCC): ICD-10-CM

## 2025-08-20 DIAGNOSIS — Z85.3 PERSONAL HISTORY OF BREAST CANCER: ICD-10-CM

## 2025-08-20 DIAGNOSIS — Z17.0 MALIGNANT NEOPLASM OF LOWER-OUTER QUADRANT OF LEFT BREAST OF FEMALE, ESTROGEN RECEPTOR POSITIVE (HCC): ICD-10-CM

## 2025-08-20 PROCEDURE — 1159F MED LIST DOCD IN RCRD: CPT | Performed by: PHYSICIAN ASSISTANT

## 2025-08-20 PROCEDURE — G8417 CALC BMI ABV UP PARAM F/U: HCPCS | Performed by: PHYSICIAN ASSISTANT

## 2025-08-20 PROCEDURE — G8427 DOCREV CUR MEDS BY ELIG CLIN: HCPCS | Performed by: PHYSICIAN ASSISTANT

## 2025-08-20 PROCEDURE — G8400 PT W/DXA NO RESULTS DOC: HCPCS | Performed by: PHYSICIAN ASSISTANT

## 2025-08-20 PROCEDURE — 1123F ACP DISCUSS/DSCN MKR DOCD: CPT | Performed by: PHYSICIAN ASSISTANT

## 2025-08-20 PROCEDURE — 1036F TOBACCO NON-USER: CPT | Performed by: PHYSICIAN ASSISTANT

## 2025-08-20 PROCEDURE — 1090F PRES/ABSN URINE INCON ASSESS: CPT | Performed by: PHYSICIAN ASSISTANT

## 2025-08-20 PROCEDURE — 99213 OFFICE O/P EST LOW 20 MIN: CPT | Performed by: PHYSICIAN ASSISTANT

## 2025-08-20 PROCEDURE — G0279 TOMOSYNTHESIS, MAMMO: HCPCS

## (undated) DEVICE — STANDARD HYPODERMIC NEEDLE,POLYPROPYLENE HUB: Brand: MONOJECT

## (undated) DEVICE — JACKSON-PRATT 100CC BULB RESERVOIR: Brand: CARDINAL HEALTH

## (undated) DEVICE — GLOVE SURG SZ 75 CRM LTX FREE POLYISOPRENE POLYMER BEAD ANTI

## (undated) DEVICE — DOUBLE BASIN PLUS 2-LF: Brand: MEDLINE INDUSTRIES, INC.

## (undated) DEVICE — MINOR CDS: Brand: MEDLINE INDUSTRIES, INC.

## (undated) DEVICE — SUTURE VCRL SZ 3-0 L36IN ABSRB UD L36MM CT-1 1/2 CIR J944H

## (undated) DEVICE — COVER US PRB W5XL96IN LTX W/ GEL

## (undated) DEVICE — TOWEL,OR,DSP,ST,BLUE,DLX,4/PK,20PK/CS: Brand: MEDLINE

## (undated) DEVICE — SUTURE PERMAHAND SZ 2-0 L18IN NONABSORBABLE BLK L26MM SH C012D

## (undated) DEVICE — THE CHANNEL CLEANING BRUSH IS A NYLON FLEXI BRUSH ATTACHED TO A FLEXIBLE PLASTIC SHEATH DESIGNED TO SAFELY REMOVE DEBRIS FROM FLEXIBLE ENDOSCOPES.

## (undated) DEVICE — 3 ML SYRINGE WITH HYPODERMIC SAFETY NEEDLE: Brand: MAGELLAN

## (undated) DEVICE — SUTURE VCRL SZ 2-0 L36IN ABSRB UD L36MM CT-1 1/2 CIR J945H

## (undated) DEVICE — CUFF,BP,DISP,1 TUBE,ADULT,HP: Brand: MEDLINE

## (undated) DEVICE — SNAR POLYP SENSATION MICRO OVL 13 240X40

## (undated) DEVICE — DRAIN JACKSON PRATT ROUND 15FR: Brand: CARDINAL HEALTH

## (undated) DEVICE — MARKER,SKIN,WI/RULER AND LABELS: Brand: MEDLINE

## (undated) DEVICE — SOLUTION IV IRRIG POUR BRL 0.9% SODIUM CHL 2F7124

## (undated) DEVICE — SPONGE LAP W18XL18IN WHT COT 4 PLY FLD STRUNG RADPQ DISP ST

## (undated) DEVICE — MSK O2 MD CONCENTR A/ LF 7FT 1P/U

## (undated) DEVICE — YANKAUER,BULB TIP WITH VENT: Brand: ARGYLE

## (undated) DEVICE — PACK,UNIVERSAL,NO GOWNS: Brand: MEDLINE

## (undated) DEVICE — Z DISCONTINUED USE 2272117 DRAPE SURG 3 QTR N INVASIVE 2 LAYR DISP

## (undated) DEVICE — MASK O2 SAMPLING AD M LUER CAPNOXYGEN

## (undated) DEVICE — SUTURE MNCRYL STRATAFIX PS 4-0 30CM

## (undated) DEVICE — PAD,NON-ADHERENT,3X8,STERILE,LF,1/PK: Brand: MEDLINE

## (undated) DEVICE — TBG SMPL FLTR LINE NASL 02/C02 A/ BX/100

## (undated) DEVICE — GLOVE SURG SZ 85 L12IN FNGR ORTHO 126MIL CRM LTX FREE

## (undated) DEVICE — MASK,OXYGEN,MED CONC,ADLT,7' TUB, UC: Brand: PENDING

## (undated) DEVICE — SENSR O2 OXIMAX FNGR A/ 18IN NONSTR

## (undated) DEVICE — SPECIMEN ORIENTATION CHARMS, SIX DISTINCTLY SHAPED STERILE 10MM CHARMS: Brand: MARGINMAP

## (undated) DEVICE — PROBE DTECT MARGIN F/LUMPECTOMY

## (undated) DEVICE — Device: Brand: DEFENDO AIR/WATER/SUCTION AND BIOPSY VALVE

## (undated) DEVICE — CLIP INT M L GRN TI TRNSVRS GRV CHEVRON SHP W/ PRECIS TIP

## (undated) DEVICE — GAUZE,SPONGE,FLUFF,6"X6.75",STRL,10/TRAY: Brand: MEDLINE

## (undated) DEVICE — BLANKET WRM W40.2XL55.9IN IORT LO BODY + MISTRAL AIR

## (undated) DEVICE — Device

## (undated) DEVICE — DRESSING GRMCDL 6 12FR D1N CNTR HOLE 4MM ANTMCRBL PRTCTVE DI

## (undated) DEVICE — 3M™ IOBAN™ 2 ANTIMICROBIAL INCISE DRAPE 6650EZ: Brand: IOBAN™ 2

## (undated) DEVICE — SUTURE ETHLN SZ 2-0 L30IN NONABSORBABLE BLK L36MM FSLX 3/8 1674H

## (undated) DEVICE — SUTURE VCRL SZ 3-0 L27IN ABSRB UD L26MM SH 1/2 CIR J416H

## (undated) DEVICE — SOLUTION IV IRRIG WATER 1000ML POUR BRL 2F7114

## (undated) DEVICE — ENDOGATOR AUXILIARY WATER JET CONNECTOR: Brand: ENDOGATOR

## (undated) DEVICE — PEN: MARKING STD 100/CS: Brand: MEDICAL ACTION INDUSTRIES

## (undated) DEVICE — SYRINGE IRRIG 60ML SFT PLIABLE BLB EZ TO GRP 1 HND USE W/

## (undated) DEVICE — THE SINGLE USE ETRAP – POLYP TRAP IS USED FOR SUCTION RETRIEVAL OF ENDOSCOPICALLY REMOVED POLYPS.: Brand: ETRAP

## (undated) DEVICE — GOWN,PREVENTION PLUS,2XL,ST,22/CS: Brand: MEDLINE

## (undated) DEVICE — GOWN,PREVENTION PLUS,XL,ST,24/CS: Brand: MEDLINE

## (undated) DEVICE — ADHESIVE SKIN CLSR 0.7ML TOP DERMBND ADV